# Patient Record
Sex: FEMALE | Race: WHITE | NOT HISPANIC OR LATINO | Employment: OTHER | URBAN - METROPOLITAN AREA
[De-identification: names, ages, dates, MRNs, and addresses within clinical notes are randomized per-mention and may not be internally consistent; named-entity substitution may affect disease eponyms.]

---

## 2017-06-01 ENCOUNTER — APPOINTMENT (EMERGENCY)
Dept: RADIOLOGY | Facility: HOSPITAL | Age: 77
DRG: 166 | End: 2017-06-01
Payer: COMMERCIAL

## 2017-06-01 ENCOUNTER — OFFICE VISIT (OUTPATIENT)
Dept: URGENT CARE | Facility: MEDICAL CENTER | Age: 77
DRG: 166 | End: 2017-06-01
Payer: COMMERCIAL

## 2017-06-01 ENCOUNTER — HOSPITAL ENCOUNTER (INPATIENT)
Facility: HOSPITAL | Age: 77
LOS: 7 days | Discharge: HOME | DRG: 166 | End: 2017-06-08
Attending: EMERGENCY MEDICINE | Admitting: INTERNAL MEDICINE
Payer: COMMERCIAL

## 2017-06-01 DIAGNOSIS — R91.8 HILAR MASS: ICD-10-CM

## 2017-06-01 DIAGNOSIS — D72.829 LEUCOCYTOSIS: ICD-10-CM

## 2017-06-01 DIAGNOSIS — I50.9 CHF (CONGESTIVE HEART FAILURE) (HCC): ICD-10-CM

## 2017-06-01 DIAGNOSIS — E87.1 HYPONATREMIA: ICD-10-CM

## 2017-06-01 DIAGNOSIS — L03.90 CELLULITIS: Primary | ICD-10-CM

## 2017-06-01 DIAGNOSIS — J90 PLEURAL EFFUSION ON RIGHT: ICD-10-CM

## 2017-06-01 PROBLEM — F17.200 CURRENT SMOKER ON SOME DAYS: Status: ACTIVE | Noted: 2017-06-01

## 2017-06-01 LAB
ANION GAP SERPL CALCULATED.3IONS-SCNC: 6 MMOL/L (ref 4–13)
BACTERIA UR QL AUTO: ABNORMAL /HPF
BASOPHILS # BLD MANUAL: 0 THOUSAND/UL (ref 0–0.1)
BASOPHILS NFR MAR MANUAL: 0 % (ref 0–1)
BILIRUB UR QL STRIP: ABNORMAL
BUN SERPL-MCNC: 10 MG/DL (ref 5–25)
CALCIUM SERPL-MCNC: 8.2 MG/DL (ref 8.3–10.1)
CHLORIDE SERPL-SCNC: 93 MMOL/L (ref 100–108)
CLARITY UR: ABNORMAL
CO2 SERPL-SCNC: 30 MMOL/L (ref 21–32)
COLOR UR: ABNORMAL
CREAT SERPL-MCNC: 0.71 MG/DL (ref 0.6–1.3)
EOSINOPHIL # BLD MANUAL: 0 THOUSAND/UL (ref 0–0.4)
EOSINOPHIL NFR BLD MANUAL: 0 % (ref 0–6)
ERYTHROCYTE [DISTWIDTH] IN BLOOD BY AUTOMATED COUNT: 13.3 % (ref 11.6–15.1)
GFR SERPL CREATININE-BSD FRML MDRD: >60 ML/MIN/1.73SQ M
GLUCOSE SERPL-MCNC: 140 MG/DL (ref 65–140)
GLUCOSE UR STRIP-MCNC: NEGATIVE MG/DL
HCT VFR BLD AUTO: 33.8 % (ref 34.8–46.1)
HGB BLD-MCNC: 12 G/DL (ref 11.5–15.4)
HGB UR QL STRIP.AUTO: ABNORMAL
KETONES UR STRIP-MCNC: ABNORMAL MG/DL
LACTATE SERPL-SCNC: 0.9 MMOL/L (ref 0.5–2)
LEUKOCYTE ESTERASE UR QL STRIP: NEGATIVE
LG PLATELETS BLD QL SMEAR: PRESENT
LYMPHOCYTES # BLD AUTO: 0.8 THOUSAND/UL (ref 0.6–4.47)
LYMPHOCYTES # BLD AUTO: 3 % (ref 14–44)
MCH RBC QN AUTO: 33 PG (ref 26.8–34.3)
MCHC RBC AUTO-ENTMCNC: 35.5 G/DL (ref 31.4–37.4)
MCV RBC AUTO: 93 FL (ref 82–98)
MONOCYTES # BLD AUTO: 0.8 THOUSAND/UL (ref 0–1.22)
MONOCYTES NFR BLD: 3 % (ref 4–12)
NEUTROPHILS # BLD MANUAL: 25.08 THOUSAND/UL (ref 1.85–7.62)
NEUTS BAND NFR BLD MANUAL: 3 % (ref 0–8)
NEUTS SEG NFR BLD AUTO: 91 % (ref 43–75)
NITRITE UR QL STRIP: POSITIVE
NON-SQ EPI CELLS URNS QL MICRO: ABNORMAL /HPF
NRBC BLD AUTO-RTO: 0 /100 WBCS
NT-PROBNP SERPL-MCNC: 714 PG/ML
PH UR STRIP.AUTO: 5.5 [PH] (ref 4.5–8)
PLATELET # BLD AUTO: 364 THOUSANDS/UL (ref 149–390)
PLATELET BLD QL SMEAR: ADEQUATE
PMV BLD AUTO: 8.9 FL (ref 8.9–12.7)
POTASSIUM SERPL-SCNC: 3.7 MMOL/L (ref 3.5–5.3)
PROT UR STRIP-MCNC: ABNORMAL MG/DL
RBC # BLD AUTO: 3.64 MILLION/UL (ref 3.81–5.12)
RBC #/AREA URNS AUTO: ABNORMAL /HPF
RBC MORPH BLD: NORMAL
SODIUM SERPL-SCNC: 129 MMOL/L (ref 136–145)
SP GR UR STRIP.AUTO: 1.02 (ref 1–1.03)
SPECIMEN SOURCE: NORMAL
TOTAL CELLS COUNTED SPEC: 100
TROPONIN I BLD-MCNC: 0.02 NG/ML (ref 0–0.08)
UROBILINOGEN UR QL STRIP.AUTO: 2 E.U./DL
WBC # BLD AUTO: 26.68 THOUSAND/UL (ref 4.31–10.16)
WBC #/AREA URNS AUTO: ABNORMAL /HPF

## 2017-06-01 PROCEDURE — 99202 OFFICE O/P NEW SF 15 MIN: CPT

## 2017-06-01 PROCEDURE — 87077 CULTURE AEROBIC IDENTIFY: CPT | Performed by: NURSE PRACTITIONER

## 2017-06-01 PROCEDURE — 81002 URINALYSIS NONAUTO W/O SCOPE: CPT | Performed by: EMERGENCY MEDICINE

## 2017-06-01 PROCEDURE — 83605 ASSAY OF LACTIC ACID: CPT | Performed by: EMERGENCY MEDICINE

## 2017-06-01 PROCEDURE — 81001 URINALYSIS AUTO W/SCOPE: CPT

## 2017-06-01 PROCEDURE — 84484 ASSAY OF TROPONIN QUANT: CPT

## 2017-06-01 PROCEDURE — S9088 SERVICES PROVIDED IN URGENT: HCPCS

## 2017-06-01 PROCEDURE — 87086 URINE CULTURE/COLONY COUNT: CPT | Performed by: NURSE PRACTITIONER

## 2017-06-01 PROCEDURE — 80048 BASIC METABOLIC PNL TOTAL CA: CPT | Performed by: EMERGENCY MEDICINE

## 2017-06-01 PROCEDURE — 99284 EMERGENCY DEPT VISIT MOD MDM: CPT

## 2017-06-01 PROCEDURE — 36415 COLL VENOUS BLD VENIPUNCTURE: CPT | Performed by: EMERGENCY MEDICINE

## 2017-06-01 PROCEDURE — 85027 COMPLETE CBC AUTOMATED: CPT | Performed by: EMERGENCY MEDICINE

## 2017-06-01 PROCEDURE — 87186 SC STD MICRODIL/AGAR DIL: CPT | Performed by: NURSE PRACTITIONER

## 2017-06-01 PROCEDURE — 87040 BLOOD CULTURE FOR BACTERIA: CPT | Performed by: EMERGENCY MEDICINE

## 2017-06-01 PROCEDURE — 71020 HB CHEST X-RAY 2VW FRONTAL&LATL: CPT

## 2017-06-01 PROCEDURE — 85007 BL SMEAR W/DIFF WBC COUNT: CPT | Performed by: EMERGENCY MEDICINE

## 2017-06-01 PROCEDURE — 83880 ASSAY OF NATRIURETIC PEPTIDE: CPT | Performed by: EMERGENCY MEDICINE

## 2017-06-01 RX ORDER — DOCUSATE SODIUM 100 MG/1
100 CAPSULE, LIQUID FILLED ORAL 2 TIMES DAILY
Status: DISCONTINUED | OUTPATIENT
Start: 2017-06-01 | End: 2017-06-08 | Stop reason: HOSPADM

## 2017-06-01 RX ORDER — CEFAZOLIN SODIUM 500 MG/2.2ML
500 INJECTION, POWDER, FOR SOLUTION INTRAMUSCULAR; INTRAVENOUS EVERY 8 HOURS
Status: DISCONTINUED | OUTPATIENT
Start: 2017-06-01 | End: 2017-06-01

## 2017-06-01 RX ORDER — SODIUM CHLORIDE 9 MG/ML
75 INJECTION, SOLUTION INTRAVENOUS CONTINUOUS
Status: DISCONTINUED | OUTPATIENT
Start: 2017-06-01 | End: 2017-06-02

## 2017-06-01 RX ORDER — CEFAZOLIN SODIUM 1 G/3ML
1000 INJECTION, POWDER, FOR SOLUTION INTRAMUSCULAR; INTRAVENOUS ONCE
Status: DISCONTINUED | OUTPATIENT
Start: 2017-06-01 | End: 2017-06-01

## 2017-06-01 RX ORDER — ACETAMINOPHEN 325 MG/1
650 TABLET ORAL EVERY 6 HOURS PRN
Status: DISCONTINUED | OUTPATIENT
Start: 2017-06-01 | End: 2017-06-08 | Stop reason: HOSPADM

## 2017-06-01 RX ADMIN — SODIUM CHLORIDE 75 ML/HR: 0.9 INJECTION, SOLUTION INTRAVENOUS at 21:41

## 2017-06-01 RX ADMIN — CEFAZOLIN SODIUM 2000 MG: 2 SOLUTION INTRAVENOUS at 20:04

## 2017-06-02 ENCOUNTER — APPOINTMENT (INPATIENT)
Dept: CT IMAGING | Facility: HOSPITAL | Age: 77
DRG: 166 | End: 2017-06-02
Payer: COMMERCIAL

## 2017-06-02 ENCOUNTER — APPOINTMENT (INPATIENT)
Dept: NON INVASIVE DIAGNOSTICS | Facility: HOSPITAL | Age: 77
DRG: 166 | End: 2017-06-02
Payer: COMMERCIAL

## 2017-06-02 LAB
ANION GAP SERPL CALCULATED.3IONS-SCNC: 7 MMOL/L (ref 4–13)
BUN SERPL-MCNC: 8 MG/DL (ref 5–25)
CALCIUM SERPL-MCNC: 8 MG/DL (ref 8.3–10.1)
CHLORIDE SERPL-SCNC: 98 MMOL/L (ref 100–108)
CO2 SERPL-SCNC: 29 MMOL/L (ref 21–32)
CREAT SERPL-MCNC: 0.5 MG/DL (ref 0.6–1.3)
ERYTHROCYTE [DISTWIDTH] IN BLOOD BY AUTOMATED COUNT: 13.5 % (ref 11.6–15.1)
GFR SERPL CREATININE-BSD FRML MDRD: >60 ML/MIN/1.73SQ M
GLUCOSE SERPL-MCNC: 98 MG/DL (ref 65–140)
HCT VFR BLD AUTO: 33 % (ref 34.8–46.1)
HGB BLD-MCNC: 11.4 G/DL (ref 11.5–15.4)
L PNEUMO1 AG UR QL IA.RAPID: NEGATIVE
MCH RBC QN AUTO: 32.1 PG (ref 26.8–34.3)
MCHC RBC AUTO-ENTMCNC: 34.5 G/DL (ref 31.4–37.4)
MCV RBC AUTO: 93 FL (ref 82–98)
PLATELET # BLD AUTO: 403 THOUSANDS/UL (ref 149–390)
PMV BLD AUTO: 9.2 FL (ref 8.9–12.7)
POTASSIUM SERPL-SCNC: 3.3 MMOL/L (ref 3.5–5.3)
RBC # BLD AUTO: 3.55 MILLION/UL (ref 3.81–5.12)
S PNEUM AG UR QL: NEGATIVE
SODIUM SERPL-SCNC: 134 MMOL/L (ref 136–145)
WBC # BLD AUTO: 22.16 THOUSAND/UL (ref 4.31–10.16)

## 2017-06-02 PROCEDURE — 87449 NOS EACH ORGANISM AG IA: CPT | Performed by: HOSPITALIST

## 2017-06-02 PROCEDURE — 74177 CT ABD & PELVIS W/CONTRAST: CPT

## 2017-06-02 PROCEDURE — 93970 EXTREMITY STUDY: CPT

## 2017-06-02 PROCEDURE — 85027 COMPLETE CBC AUTOMATED: CPT | Performed by: NURSE PRACTITIONER

## 2017-06-02 PROCEDURE — 71260 CT THORAX DX C+: CPT

## 2017-06-02 PROCEDURE — 80048 BASIC METABOLIC PNL TOTAL CA: CPT | Performed by: NURSE PRACTITIONER

## 2017-06-02 RX ORDER — AZITHROMYCIN 250 MG/1
500 TABLET, FILM COATED ORAL EVERY 24 HOURS
Status: DISCONTINUED | OUTPATIENT
Start: 2017-06-02 | End: 2017-06-02

## 2017-06-02 RX ORDER — HYDRALAZINE HYDROCHLORIDE 20 MG/ML
5 INJECTION INTRAMUSCULAR; INTRAVENOUS EVERY 6 HOURS PRN
Status: DISCONTINUED | OUTPATIENT
Start: 2017-06-02 | End: 2017-06-08 | Stop reason: HOSPADM

## 2017-06-02 RX ORDER — GUAIFENESIN 600 MG
600 TABLET, EXTENDED RELEASE 12 HR ORAL EVERY 12 HOURS SCHEDULED
Status: DISCONTINUED | OUTPATIENT
Start: 2017-06-02 | End: 2017-06-08 | Stop reason: HOSPADM

## 2017-06-02 RX ORDER — ALPRAZOLAM 0.5 MG/1
0.5 TABLET ORAL 2 TIMES DAILY PRN
Status: DISCONTINUED | OUTPATIENT
Start: 2017-06-02 | End: 2017-06-08 | Stop reason: HOSPADM

## 2017-06-02 RX ADMIN — GUAIFENESIN 600 MG: 600 TABLET, EXTENDED RELEASE ORAL at 11:30

## 2017-06-02 RX ADMIN — NICOTINE 1 PATCH: 7 PATCH, EXTENDED RELEASE TRANSDERMAL at 09:30

## 2017-06-02 RX ADMIN — CEFTRIAXONE 1000 MG: 1 INJECTION, POWDER, FOR SOLUTION INTRAMUSCULAR; INTRAVENOUS at 12:00

## 2017-06-02 RX ADMIN — ALPRAZOLAM 0.5 MG: 0.5 TABLET ORAL at 23:02

## 2017-06-02 RX ADMIN — AZITHROMYCIN 500 MG: 250 TABLET, FILM COATED ORAL at 12:30

## 2017-06-02 RX ADMIN — IOHEXOL 50 ML: 240 INJECTION, SOLUTION INTRATHECAL; INTRAVASCULAR; INTRAVENOUS; ORAL at 14:03

## 2017-06-02 RX ADMIN — IOHEXOL 100 ML: 350 INJECTION, SOLUTION INTRAVENOUS at 14:03

## 2017-06-02 RX ADMIN — GUAIFENESIN 600 MG: 600 TABLET, EXTENDED RELEASE ORAL at 21:52

## 2017-06-02 RX ADMIN — ENOXAPARIN SODIUM 40 MG: 40 INJECTION SUBCUTANEOUS at 09:30

## 2017-06-02 RX ADMIN — CEFAZOLIN 500 MG: 1 INJECTION, POWDER, FOR SOLUTION INTRAVENOUS at 05:00

## 2017-06-03 LAB
ALBUMIN SERPL BCP-MCNC: 1.6 G/DL (ref 3.5–5)
ALP SERPL-CCNC: 73 U/L (ref 46–116)
ALT SERPL W P-5'-P-CCNC: 30 U/L (ref 12–78)
ANION GAP SERPL CALCULATED.3IONS-SCNC: 5 MMOL/L (ref 4–13)
AST SERPL W P-5'-P-CCNC: 25 U/L (ref 5–45)
BACTERIA UR CULT: NORMAL
BACTERIA UR CULT: NORMAL
BASOPHILS # BLD AUTO: 0.03 THOUSANDS/ΜL (ref 0–0.1)
BASOPHILS NFR BLD AUTO: 0 % (ref 0–1)
BILIRUB SERPL-MCNC: 0.37 MG/DL (ref 0.2–1)
BUN SERPL-MCNC: 10 MG/DL (ref 5–25)
CALCIUM SERPL-MCNC: 8.2 MG/DL (ref 8.3–10.1)
CHLORIDE SERPL-SCNC: 101 MMOL/L (ref 100–108)
CO2 SERPL-SCNC: 29 MMOL/L (ref 21–32)
CREAT SERPL-MCNC: 0.49 MG/DL (ref 0.6–1.3)
EOSINOPHIL # BLD AUTO: 0.05 THOUSAND/ΜL (ref 0–0.61)
EOSINOPHIL NFR BLD AUTO: 0 % (ref 0–6)
ERYTHROCYTE [DISTWIDTH] IN BLOOD BY AUTOMATED COUNT: 13.4 % (ref 11.6–15.1)
GFR SERPL CREATININE-BSD FRML MDRD: >60 ML/MIN/1.73SQ M
GLUCOSE SERPL-MCNC: 97 MG/DL (ref 65–140)
HCT VFR BLD AUTO: 34.1 % (ref 34.8–46.1)
HGB BLD-MCNC: 11.8 G/DL (ref 11.5–15.4)
INR PPP: 1.09 (ref 0.86–1.16)
LYMPHOCYTES # BLD AUTO: 1.13 THOUSANDS/ΜL (ref 0.6–4.47)
LYMPHOCYTES NFR BLD AUTO: 6 % (ref 14–44)
MCH RBC QN AUTO: 32.2 PG (ref 26.8–34.3)
MCHC RBC AUTO-ENTMCNC: 34.6 G/DL (ref 31.4–37.4)
MCV RBC AUTO: 93 FL (ref 82–98)
MONOCYTES # BLD AUTO: 1.46 THOUSAND/ΜL (ref 0.17–1.22)
MONOCYTES NFR BLD AUTO: 7 % (ref 4–12)
NEUTROPHILS # BLD AUTO: 17.9 THOUSANDS/ΜL (ref 1.85–7.62)
NEUTS SEG NFR BLD AUTO: 87 % (ref 43–75)
NRBC BLD AUTO-RTO: 0 /100 WBCS
PLATELET # BLD AUTO: 399 THOUSANDS/UL (ref 149–390)
PMV BLD AUTO: 8.8 FL (ref 8.9–12.7)
POTASSIUM SERPL-SCNC: 3.4 MMOL/L (ref 3.5–5.3)
PROT SERPL-MCNC: 5.8 G/DL (ref 6.4–8.2)
PROTHROMBIN TIME: 14.1 SECONDS (ref 12.1–14.4)
RBC # BLD AUTO: 3.67 MILLION/UL (ref 3.81–5.12)
SODIUM SERPL-SCNC: 135 MMOL/L (ref 136–145)
WBC # BLD AUTO: 20.57 THOUSAND/UL (ref 4.31–10.16)

## 2017-06-03 PROCEDURE — 85610 PROTHROMBIN TIME: CPT | Performed by: INTERNAL MEDICINE

## 2017-06-03 PROCEDURE — 80053 COMPREHEN METABOLIC PANEL: CPT | Performed by: HOSPITALIST

## 2017-06-03 PROCEDURE — 85025 COMPLETE CBC W/AUTO DIFF WBC: CPT | Performed by: HOSPITALIST

## 2017-06-03 RX ORDER — FUROSEMIDE 10 MG/ML
20 INJECTION INTRAMUSCULAR; INTRAVENOUS DAILY
Status: DISCONTINUED | OUTPATIENT
Start: 2017-06-03 | End: 2017-06-04

## 2017-06-03 RX ADMIN — ALPRAZOLAM 0.5 MG: 0.5 TABLET ORAL at 22:48

## 2017-06-03 RX ADMIN — GUAIFENESIN 600 MG: 600 TABLET, EXTENDED RELEASE ORAL at 21:43

## 2017-06-03 RX ADMIN — CEFTRIAXONE 1000 MG: 1 INJECTION, POWDER, FOR SOLUTION INTRAMUSCULAR; INTRAVENOUS at 12:00

## 2017-06-03 RX ADMIN — FUROSEMIDE 20 MG: 10 INJECTION, SOLUTION INTRAMUSCULAR; INTRAVENOUS at 13:15

## 2017-06-03 RX ADMIN — HYDRALAZINE HYDROCHLORIDE 5 MG: 20 INJECTION INTRAMUSCULAR; INTRAVENOUS at 00:33

## 2017-06-03 RX ADMIN — NICOTINE 1 PATCH: 7 PATCH, EXTENDED RELEASE TRANSDERMAL at 08:58

## 2017-06-03 RX ADMIN — GUAIFENESIN 600 MG: 600 TABLET, EXTENDED RELEASE ORAL at 08:57

## 2017-06-03 RX ADMIN — HYDRALAZINE HYDROCHLORIDE 5 MG: 20 INJECTION INTRAMUSCULAR; INTRAVENOUS at 08:53

## 2017-06-03 RX ADMIN — ENOXAPARIN SODIUM 40 MG: 40 INJECTION SUBCUTANEOUS at 08:57

## 2017-06-04 RX ORDER — FUROSEMIDE 10 MG/ML
40 INJECTION INTRAMUSCULAR; INTRAVENOUS ONCE
Status: COMPLETED | OUTPATIENT
Start: 2017-06-04 | End: 2017-06-04

## 2017-06-04 RX ADMIN — CEFTRIAXONE 1000 MG: 1 INJECTION, POWDER, FOR SOLUTION INTRAMUSCULAR; INTRAVENOUS at 11:00

## 2017-06-04 RX ADMIN — ENOXAPARIN SODIUM 40 MG: 40 INJECTION SUBCUTANEOUS at 09:45

## 2017-06-04 RX ADMIN — ALPRAZOLAM 0.5 MG: 0.5 TABLET ORAL at 20:40

## 2017-06-04 RX ADMIN — FUROSEMIDE 40 MG: 10 INJECTION, SOLUTION INTRAMUSCULAR; INTRAVENOUS at 10:01

## 2017-06-04 RX ADMIN — GUAIFENESIN 600 MG: 600 TABLET, EXTENDED RELEASE ORAL at 20:40

## 2017-06-04 RX ADMIN — GUAIFENESIN 600 MG: 600 TABLET, EXTENDED RELEASE ORAL at 09:45

## 2017-06-04 RX ADMIN — NICOTINE 1 PATCH: 7 PATCH, EXTENDED RELEASE TRANSDERMAL at 09:45

## 2017-06-05 ENCOUNTER — APPOINTMENT (INPATIENT)
Dept: RADIOLOGY | Facility: HOSPITAL | Age: 77
DRG: 166 | End: 2017-06-05
Attending: INTERNAL MEDICINE
Payer: COMMERCIAL

## 2017-06-05 LAB
ANION GAP SERPL CALCULATED.3IONS-SCNC: 4 MMOL/L (ref 4–13)
BUN SERPL-MCNC: 14 MG/DL (ref 5–25)
CALCIUM SERPL-MCNC: 8.2 MG/DL (ref 8.3–10.1)
CHLORIDE SERPL-SCNC: 101 MMOL/L (ref 100–108)
CO2 SERPL-SCNC: 30 MMOL/L (ref 21–32)
CREAT SERPL-MCNC: 0.66 MG/DL (ref 0.6–1.3)
GFR SERPL CREATININE-BSD FRML MDRD: >60 ML/MIN/1.73SQ M
GLUCOSE SERPL-MCNC: 90 MG/DL (ref 65–140)
INR PPP: 0.99 (ref 0.86–1.16)
LDH SERPL-CCNC: 156 U/L (ref 81–234)
POTASSIUM SERPL-SCNC: 3.7 MMOL/L (ref 3.5–5.3)
PROT SERPL-MCNC: 6.4 G/DL (ref 6.4–8.2)
PROTHROMBIN TIME: 13.1 SECONDS (ref 12.1–14.4)
SODIUM SERPL-SCNC: 135 MMOL/L (ref 136–145)

## 2017-06-05 PROCEDURE — 80048 BASIC METABOLIC PNL TOTAL CA: CPT | Performed by: HOSPITALIST

## 2017-06-05 PROCEDURE — 85610 PROTHROMBIN TIME: CPT | Performed by: HOSPITALIST

## 2017-06-05 PROCEDURE — 76604 US EXAM CHEST: CPT

## 2017-06-05 PROCEDURE — 83615 LACTATE (LD) (LDH) ENZYME: CPT | Performed by: INTERNAL MEDICINE

## 2017-06-05 PROCEDURE — 84155 ASSAY OF PROTEIN SERUM: CPT | Performed by: INTERNAL MEDICINE

## 2017-06-05 RX ORDER — LIDOCAINE HYDROCHLORIDE 40 MG/ML
5 INJECTION, SOLUTION RETROBULBAR; TOPICAL ONCE
Status: DISCONTINUED | OUTPATIENT
Start: 2017-06-07 | End: 2017-06-07 | Stop reason: HOSPADM

## 2017-06-05 RX ADMIN — CEFTRIAXONE 1000 MG: 1 INJECTION, POWDER, FOR SOLUTION INTRAMUSCULAR; INTRAVENOUS at 11:46

## 2017-06-05 RX ADMIN — ALPRAZOLAM 0.5 MG: 0.5 TABLET ORAL at 07:50

## 2017-06-05 RX ADMIN — HYDRALAZINE HYDROCHLORIDE 5 MG: 20 INJECTION INTRAMUSCULAR; INTRAVENOUS at 00:00

## 2017-06-05 RX ADMIN — HYDRALAZINE HYDROCHLORIDE 5 MG: 20 INJECTION INTRAMUSCULAR; INTRAVENOUS at 23:08

## 2017-06-05 RX ADMIN — GUAIFENESIN 600 MG: 600 TABLET, EXTENDED RELEASE ORAL at 07:49

## 2017-06-05 RX ADMIN — GUAIFENESIN 600 MG: 600 TABLET, EXTENDED RELEASE ORAL at 21:38

## 2017-06-05 RX ADMIN — NICOTINE 1 PATCH: 7 PATCH, EXTENDED RELEASE TRANSDERMAL at 07:55

## 2017-06-05 RX ADMIN — ALPRAZOLAM 0.5 MG: 0.5 TABLET ORAL at 21:40

## 2017-06-06 ENCOUNTER — ANESTHESIA EVENT (INPATIENT)
Dept: GASTROENTEROLOGY | Facility: HOSPITAL | Age: 77
DRG: 166 | End: 2017-06-06
Payer: COMMERCIAL

## 2017-06-06 LAB
ANION GAP SERPL CALCULATED.3IONS-SCNC: 5 MMOL/L (ref 4–13)
BACTERIA BLD CULT: NORMAL
BACTERIA BLD CULT: NORMAL
BASOPHILS # BLD AUTO: 0.05 THOUSANDS/ΜL (ref 0–0.1)
BASOPHILS NFR BLD AUTO: 0 % (ref 0–1)
BUN SERPL-MCNC: 14 MG/DL (ref 5–25)
CALCIUM SERPL-MCNC: 8.3 MG/DL (ref 8.3–10.1)
CHLORIDE SERPL-SCNC: 101 MMOL/L (ref 100–108)
CO2 SERPL-SCNC: 29 MMOL/L (ref 21–32)
CREAT SERPL-MCNC: 0.5 MG/DL (ref 0.6–1.3)
EOSINOPHIL # BLD AUTO: 0.17 THOUSAND/ΜL (ref 0–0.61)
EOSINOPHIL NFR BLD AUTO: 1 % (ref 0–6)
ERYTHROCYTE [DISTWIDTH] IN BLOOD BY AUTOMATED COUNT: 14 % (ref 11.6–15.1)
GFR SERPL CREATININE-BSD FRML MDRD: >60 ML/MIN/1.73SQ M
GLUCOSE SERPL-MCNC: 95 MG/DL (ref 65–140)
HCT VFR BLD AUTO: 33.5 % (ref 34.8–46.1)
HGB BLD-MCNC: 11.1 G/DL (ref 11.5–15.4)
LYMPHOCYTES # BLD AUTO: 1.14 THOUSANDS/ΜL (ref 0.6–4.47)
LYMPHOCYTES NFR BLD AUTO: 9 % (ref 14–44)
MCH RBC QN AUTO: 31.2 PG (ref 26.8–34.3)
MCHC RBC AUTO-ENTMCNC: 33.1 G/DL (ref 31.4–37.4)
MCV RBC AUTO: 94 FL (ref 82–98)
MONOCYTES # BLD AUTO: 1.31 THOUSAND/ΜL (ref 0.17–1.22)
MONOCYTES NFR BLD AUTO: 10 % (ref 4–12)
NEUTROPHILS # BLD AUTO: 10.13 THOUSANDS/ΜL (ref 1.85–7.62)
NEUTS SEG NFR BLD AUTO: 80 % (ref 43–75)
NRBC BLD AUTO-RTO: 0 /100 WBCS
PLATELET # BLD AUTO: 459 THOUSANDS/UL (ref 149–390)
PMV BLD AUTO: 9 FL (ref 8.9–12.7)
POTASSIUM SERPL-SCNC: 4 MMOL/L (ref 3.5–5.3)
RBC # BLD AUTO: 3.56 MILLION/UL (ref 3.81–5.12)
SODIUM SERPL-SCNC: 135 MMOL/L (ref 136–145)
WBC # BLD AUTO: 12.8 THOUSAND/UL (ref 4.31–10.16)

## 2017-06-06 PROCEDURE — 85025 COMPLETE CBC W/AUTO DIFF WBC: CPT | Performed by: INTERNAL MEDICINE

## 2017-06-06 PROCEDURE — 80048 BASIC METABOLIC PNL TOTAL CA: CPT | Performed by: INTERNAL MEDICINE

## 2017-06-06 RX ORDER — CEPHALEXIN 500 MG/1
500 CAPSULE ORAL EVERY 6 HOURS SCHEDULED
Status: DISCONTINUED | OUTPATIENT
Start: 2017-06-06 | End: 2017-06-08 | Stop reason: HOSPADM

## 2017-06-06 RX ADMIN — ENOXAPARIN SODIUM 40 MG: 40 INJECTION SUBCUTANEOUS at 08:36

## 2017-06-06 RX ADMIN — GUAIFENESIN 600 MG: 600 TABLET, EXTENDED RELEASE ORAL at 08:36

## 2017-06-06 RX ADMIN — GUAIFENESIN 600 MG: 600 TABLET, EXTENDED RELEASE ORAL at 21:24

## 2017-06-06 RX ADMIN — NICOTINE 1 PATCH: 7 PATCH, EXTENDED RELEASE TRANSDERMAL at 08:36

## 2017-06-06 RX ADMIN — CEPHALEXIN 500 MG: 500 CAPSULE ORAL at 18:36

## 2017-06-06 RX ADMIN — ALPRAZOLAM 0.5 MG: 0.5 TABLET ORAL at 23:00

## 2017-06-06 RX ADMIN — CEPHALEXIN 500 MG: 500 CAPSULE ORAL at 13:57

## 2017-06-07 ENCOUNTER — ANESTHESIA (INPATIENT)
Dept: GASTROENTEROLOGY | Facility: HOSPITAL | Age: 77
DRG: 166 | End: 2017-06-07
Payer: COMMERCIAL

## 2017-06-07 LAB
BASOPHILS # BLD AUTO: 0.03 THOUSANDS/ΜL (ref 0–0.1)
BASOPHILS NFR BLD AUTO: 0 % (ref 0–1)
EOSINOPHIL # BLD AUTO: 0.13 THOUSAND/ΜL (ref 0–0.61)
EOSINOPHIL NFR BLD AUTO: 1 % (ref 0–6)
ERYTHROCYTE [DISTWIDTH] IN BLOOD BY AUTOMATED COUNT: 13.9 % (ref 11.6–15.1)
HCT VFR BLD AUTO: 34.9 % (ref 34.8–46.1)
HGB BLD-MCNC: 11.3 G/DL (ref 11.5–15.4)
LYMPHOCYTES # BLD AUTO: 1.13 THOUSANDS/ΜL (ref 0.6–4.47)
LYMPHOCYTES NFR BLD AUTO: 9 % (ref 14–44)
MCH RBC QN AUTO: 30.9 PG (ref 26.8–34.3)
MCHC RBC AUTO-ENTMCNC: 32.4 G/DL (ref 31.4–37.4)
MCV RBC AUTO: 95 FL (ref 82–98)
MONOCYTES # BLD AUTO: 1.25 THOUSAND/ΜL (ref 0.17–1.22)
MONOCYTES NFR BLD AUTO: 10 % (ref 4–12)
NEUTROPHILS # BLD AUTO: 9.52 THOUSANDS/ΜL (ref 1.85–7.62)
NEUTS SEG NFR BLD AUTO: 80 % (ref 43–75)
PLATELET # BLD AUTO: 491 THOUSANDS/UL (ref 149–390)
PMV BLD AUTO: 8.7 FL (ref 8.9–12.7)
RBC # BLD AUTO: 3.66 MILLION/UL (ref 3.81–5.12)
WBC # BLD AUTO: 12.06 THOUSAND/UL (ref 4.31–10.16)

## 2017-06-07 PROCEDURE — 0BB58ZX EXCISION OF RIGHT MIDDLE LOBE BRONCHUS, VIA NATURAL OR ARTIFICIAL OPENING ENDOSCOPIC, DIAGNOSTIC: ICD-10-PCS | Performed by: INTERNAL MEDICINE

## 2017-06-07 PROCEDURE — 87118 MYCOBACTERIC IDENTIFICATION: CPT | Performed by: INTERNAL MEDICINE

## 2017-06-07 PROCEDURE — 87070 CULTURE OTHR SPECIMN AEROBIC: CPT | Performed by: INTERNAL MEDICINE

## 2017-06-07 PROCEDURE — 07B74ZX EXCISION OF THORAX LYMPHATIC, PERCUTANEOUS ENDOSCOPIC APPROACH, DIAGNOSTIC: ICD-10-PCS | Performed by: INTERNAL MEDICINE

## 2017-06-07 PROCEDURE — 87116 MYCOBACTERIA CULTURE: CPT | Performed by: INTERNAL MEDICINE

## 2017-06-07 PROCEDURE — 88305 TISSUE EXAM BY PATHOLOGIST: CPT | Performed by: INTERNAL MEDICINE

## 2017-06-07 PROCEDURE — 88342 IMHCHEM/IMCYTCHM 1ST ANTB: CPT | Performed by: INTERNAL MEDICINE

## 2017-06-07 PROCEDURE — 88173 CYTOPATH EVAL FNA REPORT: CPT | Performed by: INTERNAL MEDICINE

## 2017-06-07 PROCEDURE — 88172 CYTP DX EVAL FNA 1ST EA SITE: CPT | Performed by: INTERNAL MEDICINE

## 2017-06-07 PROCEDURE — 88341 IMHCHEM/IMCYTCHM EA ADD ANTB: CPT | Performed by: INTERNAL MEDICINE

## 2017-06-07 PROCEDURE — 85025 COMPLETE CBC W/AUTO DIFF WBC: CPT | Performed by: INTERNAL MEDICINE

## 2017-06-07 PROCEDURE — 87206 SMEAR FLUORESCENT/ACID STAI: CPT | Performed by: INTERNAL MEDICINE

## 2017-06-07 PROCEDURE — 88112 CYTOPATH CELL ENHANCE TECH: CPT | Performed by: INTERNAL MEDICINE

## 2017-06-07 PROCEDURE — 87102 FUNGUS ISOLATION CULTURE: CPT | Performed by: INTERNAL MEDICINE

## 2017-06-07 RX ORDER — LORAZEPAM 2 MG/ML
0.5 INJECTION INTRAMUSCULAR ONCE
Status: COMPLETED | OUTPATIENT
Start: 2017-06-07 | End: 2017-06-07

## 2017-06-07 RX ORDER — ONDANSETRON 2 MG/ML
4 INJECTION INTRAMUSCULAR; INTRAVENOUS EVERY 6 HOURS PRN
Status: DISCONTINUED | OUTPATIENT
Start: 2017-06-07 | End: 2017-06-07 | Stop reason: HOSPADM

## 2017-06-07 RX ORDER — LIDOCAINE HYDROCHLORIDE 20 MG/ML
INJECTION, SOLUTION EPIDURAL; INFILTRATION; INTRACAUDAL; PERINEURAL AS NEEDED
Status: DISCONTINUED | OUTPATIENT
Start: 2017-06-07 | End: 2017-06-07 | Stop reason: HOSPADM

## 2017-06-07 RX ORDER — SODIUM CHLORIDE 9 MG/ML
125 INJECTION, SOLUTION INTRAVENOUS CONTINUOUS
Status: DISCONTINUED | OUTPATIENT
Start: 2017-06-07 | End: 2017-06-08

## 2017-06-07 RX ORDER — PROPOFOL 10 MG/ML
INJECTION, EMULSION INTRAVENOUS AS NEEDED
Status: DISCONTINUED | OUTPATIENT
Start: 2017-06-07 | End: 2017-06-07 | Stop reason: SURG

## 2017-06-07 RX ORDER — LIDOCAINE HYDROCHLORIDE 20 MG/ML
INJECTION, SOLUTION INFILTRATION; PERINEURAL AS NEEDED
Status: DISCONTINUED | OUTPATIENT
Start: 2017-06-07 | End: 2017-06-07 | Stop reason: HOSPADM

## 2017-06-07 RX ADMIN — CEPHALEXIN 500 MG: 500 CAPSULE ORAL at 17:30

## 2017-06-07 RX ADMIN — GUAIFENESIN 600 MG: 600 TABLET, EXTENDED RELEASE ORAL at 20:50

## 2017-06-07 RX ADMIN — PROPOFOL 50 MG: 10 INJECTION, EMULSION INTRAVENOUS at 14:51

## 2017-06-07 RX ADMIN — LIDOCAINE HYDROCHLORIDE 50 MG: 20 INJECTION, SOLUTION INTRAVENOUS at 14:35

## 2017-06-07 RX ADMIN — PROPOFOL 50 MG: 10 INJECTION, EMULSION INTRAVENOUS at 15:01

## 2017-06-07 RX ADMIN — PROPOFOL 50 MG: 10 INJECTION, EMULSION INTRAVENOUS at 14:45

## 2017-06-07 RX ADMIN — CEPHALEXIN 500 MG: 500 CAPSULE ORAL at 00:15

## 2017-06-07 RX ADMIN — PROPOFOL 50 MG: 10 INJECTION, EMULSION INTRAVENOUS at 14:55

## 2017-06-07 RX ADMIN — GUAIFENESIN 600 MG: 600 TABLET, EXTENDED RELEASE ORAL at 08:37

## 2017-06-07 RX ADMIN — CEPHALEXIN 500 MG: 500 CAPSULE ORAL at 11:28

## 2017-06-07 RX ADMIN — LORAZEPAM 0.5 MG: 2 INJECTION INTRAMUSCULAR; INTRAVENOUS at 02:02

## 2017-06-07 RX ADMIN — HYDRALAZINE HYDROCHLORIDE 5 MG: 20 INJECTION INTRAMUSCULAR; INTRAVENOUS at 00:16

## 2017-06-07 RX ADMIN — PROPOFOL 50 MG: 10 INJECTION, EMULSION INTRAVENOUS at 14:42

## 2017-06-07 RX ADMIN — CEPHALEXIN 500 MG: 500 CAPSULE ORAL at 05:52

## 2017-06-07 RX ADMIN — PROPOFOL 30 MG: 10 INJECTION, EMULSION INTRAVENOUS at 14:39

## 2017-06-07 RX ADMIN — PROPOFOL 50 MG: 10 INJECTION, EMULSION INTRAVENOUS at 15:06

## 2017-06-07 RX ADMIN — PROPOFOL 20 MG: 10 INJECTION, EMULSION INTRAVENOUS at 14:37

## 2017-06-07 RX ADMIN — PROPOFOL 50 MG: 10 INJECTION, EMULSION INTRAVENOUS at 14:35

## 2017-06-08 VITALS
HEART RATE: 79 BPM | RESPIRATION RATE: 16 BRPM | HEIGHT: 63 IN | DIASTOLIC BLOOD PRESSURE: 56 MMHG | WEIGHT: 104.06 LBS | OXYGEN SATURATION: 96 % | BODY MASS INDEX: 18.44 KG/M2 | TEMPERATURE: 97.4 F | SYSTOLIC BLOOD PRESSURE: 146 MMHG

## 2017-06-08 PROBLEM — R91.8 HILAR MASS: Status: ACTIVE | Noted: 2017-06-08

## 2017-06-08 PROCEDURE — 90670 PCV13 VACCINE IM: CPT | Performed by: INTERNAL MEDICINE

## 2017-06-08 PROCEDURE — G0009 ADMIN PNEUMOCOCCAL VACCINE: HCPCS | Performed by: INTERNAL MEDICINE

## 2017-06-08 RX ORDER — GUAIFENESIN 600 MG
600 TABLET, EXTENDED RELEASE 12 HR ORAL EVERY 12 HOURS SCHEDULED
Qty: 30 TABLET | Refills: 0 | Status: SHIPPED | OUTPATIENT
Start: 2017-06-08 | End: 2017-07-27 | Stop reason: ALTCHOICE

## 2017-06-08 RX ORDER — CEPHALEXIN 500 MG/1
500 CAPSULE ORAL 2 TIMES DAILY
Qty: 10 CAPSULE | Refills: 0 | Status: SHIPPED | OUTPATIENT
Start: 2017-06-08 | End: 2017-06-13

## 2017-06-08 RX ADMIN — HYDRALAZINE HYDROCHLORIDE 5 MG: 20 INJECTION INTRAMUSCULAR; INTRAVENOUS at 07:51

## 2017-06-08 RX ADMIN — CEPHALEXIN 500 MG: 500 CAPSULE ORAL at 11:29

## 2017-06-08 RX ADMIN — CEPHALEXIN 500 MG: 500 CAPSULE ORAL at 00:52

## 2017-06-08 RX ADMIN — GUAIFENESIN 600 MG: 600 TABLET, EXTENDED RELEASE ORAL at 08:00

## 2017-06-08 RX ADMIN — CEPHALEXIN 500 MG: 500 CAPSULE ORAL at 05:29

## 2017-06-08 RX ADMIN — ENOXAPARIN SODIUM 40 MG: 40 INJECTION SUBCUTANEOUS at 08:00

## 2017-06-08 RX ADMIN — PNEUMOCOCCAL 13-VALENT CONJUGATE VACCINE 0.5 ML: 2.2; 2.2; 2.2; 2.2; 2.2; 4.4; 2.2; 2.2; 2.2; 2.2; 2.2; 2.2; 2.2 INJECTION, SUSPENSION INTRAMUSCULAR at 12:25

## 2017-06-09 LAB
BACTERIA BRONCH AEROBE CULT: NORMAL
GRAM STN SPEC: NORMAL

## 2017-06-15 ENCOUNTER — ALLSCRIPTS OFFICE VISIT (OUTPATIENT)
Dept: OTHER | Facility: OTHER | Age: 77
End: 2017-06-15

## 2017-06-15 DIAGNOSIS — Z13.820 ENCOUNTER FOR SCREENING FOR OSTEOPOROSIS: ICD-10-CM

## 2017-06-15 DIAGNOSIS — E87.1 HYPO-OSMOLALITY AND HYPONATREMIA: ICD-10-CM

## 2017-06-15 DIAGNOSIS — C34.91 MALIGNANT NEOPLASM OF UNSPECIFIED PART OF RIGHT BRONCHUS OR LUNG (HCC): ICD-10-CM

## 2017-06-19 ENCOUNTER — TRANSCRIBE ORDERS (OUTPATIENT)
Dept: ADMINISTRATIVE | Facility: HOSPITAL | Age: 77
End: 2017-06-19

## 2017-06-19 DIAGNOSIS — C34.91 PRIMARY LUNG CANCER WITH METASTASIS FROM LUNG TO OTHER SITE, RIGHT (HCC): Primary | ICD-10-CM

## 2017-06-26 ENCOUNTER — HOSPITAL ENCOUNTER (OUTPATIENT)
Dept: RADIOLOGY | Age: 77
Discharge: HOME | End: 2017-06-26
Payer: COMMERCIAL

## 2017-06-26 VITALS — BODY MASS INDEX: 17.01 KG/M2 | WEIGHT: 96 LBS

## 2017-06-26 DIAGNOSIS — C34.91 PRIMARY LUNG CANCER WITH METASTASIS FROM LUNG TO OTHER SITE, RIGHT (HCC): ICD-10-CM

## 2017-06-26 LAB — GLUCOSE SERPL-MCNC: 95 MG/DL (ref 65–140)

## 2017-06-26 PROCEDURE — 82948 REAGENT STRIP/BLOOD GLUCOSE: CPT

## 2017-06-26 PROCEDURE — A9552 F18 FDG: HCPCS

## 2017-06-26 PROCEDURE — 78815 PET IMAGE W/CT SKULL-THIGH: CPT

## 2017-06-26 RX ADMIN — IOHEXOL 5 ML: 240 INJECTION, SOLUTION INTRATHECAL; INTRAVASCULAR; INTRAVENOUS; ORAL at 13:50

## 2017-06-28 ENCOUNTER — APPOINTMENT (OUTPATIENT)
Dept: LAB | Facility: CLINIC | Age: 77
End: 2017-06-28
Payer: COMMERCIAL

## 2017-06-28 ENCOUNTER — TRANSCRIBE ORDERS (OUTPATIENT)
Dept: LAB | Facility: CLINIC | Age: 77
End: 2017-06-28

## 2017-06-28 DIAGNOSIS — C34.91 MALIGNANT NEOPLASM OF UNSPECIFIED PART OF RIGHT BRONCHUS OR LUNG (HCC): ICD-10-CM

## 2017-06-28 DIAGNOSIS — E87.1 HYPO-OSMOLALITY AND HYPONATREMIA: ICD-10-CM

## 2017-06-28 LAB
ANION GAP SERPL CALCULATED.3IONS-SCNC: 7 MMOL/L (ref 4–13)
BASOPHILS # BLD AUTO: 0.06 THOUSANDS/ΜL (ref 0–0.1)
BASOPHILS NFR BLD AUTO: 1 % (ref 0–1)
BUN SERPL-MCNC: 9 MG/DL (ref 5–25)
CALCIUM SERPL-MCNC: 9 MG/DL (ref 8.3–10.1)
CHLORIDE SERPL-SCNC: 93 MMOL/L (ref 100–108)
CO2 SERPL-SCNC: 28 MMOL/L (ref 21–32)
CREAT SERPL-MCNC: 0.5 MG/DL (ref 0.6–1.3)
EOSINOPHIL # BLD AUTO: 0.06 THOUSAND/ΜL (ref 0–0.61)
EOSINOPHIL NFR BLD AUTO: 1 % (ref 0–6)
ERYTHROCYTE [DISTWIDTH] IN BLOOD BY AUTOMATED COUNT: 14.4 % (ref 11.6–15.1)
GFR SERPL CREATININE-BSD FRML MDRD: >60 ML/MIN/1.73SQ M
GLUCOSE P FAST SERPL-MCNC: 134 MG/DL (ref 65–99)
HCT VFR BLD AUTO: 36.9 % (ref 34.8–46.1)
HGB BLD-MCNC: 12.1 G/DL (ref 11.5–15.4)
LYMPHOCYTES # BLD AUTO: 1.2 THOUSANDS/ΜL (ref 0.6–4.47)
LYMPHOCYTES NFR BLD AUTO: 12 % (ref 14–44)
MCH RBC QN AUTO: 31.1 PG (ref 26.8–34.3)
MCHC RBC AUTO-ENTMCNC: 32.8 G/DL (ref 31.4–37.4)
MCV RBC AUTO: 95 FL (ref 82–98)
MONOCYTES # BLD AUTO: 1.23 THOUSAND/ΜL (ref 0.17–1.22)
MONOCYTES NFR BLD AUTO: 12 % (ref 4–12)
NEUTROPHILS # BLD AUTO: 7.52 THOUSANDS/ΜL (ref 1.85–7.62)
NEUTS SEG NFR BLD AUTO: 74 % (ref 43–75)
NRBC BLD AUTO-RTO: 0 /100 WBCS
PLATELET # BLD AUTO: 481 THOUSANDS/UL (ref 149–390)
PMV BLD AUTO: 9.5 FL (ref 8.9–12.7)
POTASSIUM SERPL-SCNC: 4.1 MMOL/L (ref 3.5–5.3)
RBC # BLD AUTO: 3.89 MILLION/UL (ref 3.81–5.12)
SODIUM SERPL-SCNC: 128 MMOL/L (ref 136–145)
WBC # BLD AUTO: 10.1 THOUSAND/UL (ref 4.31–10.16)

## 2017-06-28 PROCEDURE — 85025 COMPLETE CBC W/AUTO DIFF WBC: CPT

## 2017-06-28 PROCEDURE — 36415 COLL VENOUS BLD VENIPUNCTURE: CPT

## 2017-06-28 PROCEDURE — 80048 BASIC METABOLIC PNL TOTAL CA: CPT

## 2017-06-29 ENCOUNTER — GENERIC CONVERSION - ENCOUNTER (OUTPATIENT)
Dept: OTHER | Facility: OTHER | Age: 77
End: 2017-06-29

## 2017-06-29 LAB
LABORATORY COMMENT REPORT: NORMAL
M AVIUM CMPLX RRNA SPEC QL PROBE: POSITIVE
M GORDONAE RRNA SPEC QL PROBE: ABNORMAL
M KANSASII RRNA SPEC QL PROBE: ABNORMAL
M TB CMPLX RRNA SPEC QL PROBE: NEGATIVE
OTHER: ABNORMAL

## 2017-06-30 LAB
MYCOBACTERIUM SPEC CULT: ABNORMAL
MYCOBACTERIUM SPEC CULT: ABNORMAL
RHODAMINE-AURAMINE STN SPEC: ABNORMAL

## 2017-07-05 ENCOUNTER — ALLSCRIPTS OFFICE VISIT (OUTPATIENT)
Dept: OTHER | Facility: OTHER | Age: 77
End: 2017-07-05

## 2017-07-10 LAB — FUNGUS SPEC CULT: NORMAL

## 2017-07-12 ENCOUNTER — ALLSCRIPTS OFFICE VISIT (OUTPATIENT)
Dept: OTHER | Facility: OTHER | Age: 77
End: 2017-07-12

## 2017-07-18 ENCOUNTER — ALLSCRIPTS OFFICE VISIT (OUTPATIENT)
Dept: OTHER | Facility: OTHER | Age: 77
End: 2017-07-18

## 2017-07-18 DIAGNOSIS — C79.51 SECONDARY MALIGNANT NEOPLASM OF BONE (HCC): ICD-10-CM

## 2017-07-20 ENCOUNTER — GENERIC CONVERSION - ENCOUNTER (OUTPATIENT)
Dept: OTHER | Facility: OTHER | Age: 77
End: 2017-07-20

## 2017-07-25 ENCOUNTER — TRANSCRIBE ORDERS (OUTPATIENT)
Dept: LAB | Facility: CLINIC | Age: 77
End: 2017-07-25

## 2017-07-25 ENCOUNTER — APPOINTMENT (OUTPATIENT)
Dept: LAB | Facility: CLINIC | Age: 77
End: 2017-07-25
Payer: COMMERCIAL

## 2017-07-25 DIAGNOSIS — C79.51 SECONDARY MALIGNANT NEOPLASM OF BONE (HCC): ICD-10-CM

## 2017-07-25 LAB
ALBUMIN SERPL BCP-MCNC: 2.5 G/DL (ref 3.5–5)
ALP SERPL-CCNC: 94 U/L (ref 46–116)
ALT SERPL W P-5'-P-CCNC: 23 U/L (ref 12–78)
ANION GAP SERPL CALCULATED.3IONS-SCNC: 10 MMOL/L (ref 4–13)
AST SERPL W P-5'-P-CCNC: 20 U/L (ref 5–45)
BASOPHILS # BLD AUTO: 0.05 THOUSANDS/ΜL (ref 0–0.1)
BASOPHILS NFR BLD AUTO: 0 % (ref 0–1)
BILIRUB SERPL-MCNC: 0.62 MG/DL (ref 0.2–1)
BUN SERPL-MCNC: 9 MG/DL (ref 5–25)
CALCIUM SERPL-MCNC: 9.1 MG/DL (ref 8.3–10.1)
CHLORIDE SERPL-SCNC: 90 MMOL/L (ref 100–108)
CO2 SERPL-SCNC: 25 MMOL/L (ref 21–32)
CREAT SERPL-MCNC: 0.36 MG/DL (ref 0.6–1.3)
EOSINOPHIL # BLD AUTO: 0.02 THOUSAND/ΜL (ref 0–0.61)
EOSINOPHIL NFR BLD AUTO: 0 % (ref 0–6)
ERYTHROCYTE [DISTWIDTH] IN BLOOD BY AUTOMATED COUNT: 14.2 % (ref 11.6–15.1)
GFR SERPL CREATININE-BSD FRML MDRD: 106 ML/MIN/1.73SQ M
GLUCOSE SERPL-MCNC: 93 MG/DL (ref 65–140)
HCT VFR BLD AUTO: 33.1 % (ref 34.8–46.1)
HGB BLD-MCNC: 11 G/DL (ref 11.5–15.4)
LYMPHOCYTES # BLD AUTO: 0.95 THOUSANDS/ΜL (ref 0.6–4.47)
LYMPHOCYTES NFR BLD AUTO: 6 % (ref 14–44)
MCH RBC QN AUTO: 29.2 PG (ref 26.8–34.3)
MCHC RBC AUTO-ENTMCNC: 33.2 G/DL (ref 31.4–37.4)
MCV RBC AUTO: 88 FL (ref 82–98)
MONOCYTES # BLD AUTO: 1.78 THOUSAND/ΜL (ref 0.17–1.22)
MONOCYTES NFR BLD AUTO: 12 % (ref 4–12)
NEUTROPHILS # BLD AUTO: 12.34 THOUSANDS/ΜL (ref 1.85–7.62)
NEUTS SEG NFR BLD AUTO: 82 % (ref 43–75)
NRBC BLD AUTO-RTO: 0 /100 WBCS
PLATELET # BLD AUTO: 511 THOUSANDS/UL (ref 149–390)
PMV BLD AUTO: 9.2 FL (ref 8.9–12.7)
POTASSIUM SERPL-SCNC: 3.7 MMOL/L (ref 3.5–5.3)
PROT SERPL-MCNC: 7.3 G/DL (ref 6.4–8.2)
RBC # BLD AUTO: 3.77 MILLION/UL (ref 3.81–5.12)
SODIUM SERPL-SCNC: 125 MMOL/L (ref 136–145)
WBC # BLD AUTO: 15.19 THOUSAND/UL (ref 4.31–10.16)

## 2017-07-25 PROCEDURE — 80053 COMPREHEN METABOLIC PANEL: CPT

## 2017-07-25 PROCEDURE — 36415 COLL VENOUS BLD VENIPUNCTURE: CPT

## 2017-07-25 PROCEDURE — 85025 COMPLETE CBC W/AUTO DIFF WBC: CPT

## 2017-07-26 RX ORDER — SODIUM CHLORIDE 9 MG/ML
20 INJECTION, SOLUTION INTRAVENOUS CONTINUOUS
Status: DISCONTINUED | OUTPATIENT
Start: 2017-07-27 | End: 2017-07-30 | Stop reason: HOSPADM

## 2017-07-27 ENCOUNTER — HOSPITAL ENCOUNTER (OUTPATIENT)
Dept: INFUSION CENTER | Facility: CLINIC | Age: 77
Discharge: HOME | End: 2017-07-27
Payer: COMMERCIAL

## 2017-07-27 VITALS
BODY MASS INDEX: 16.8 KG/M2 | RESPIRATION RATE: 16 BRPM | SYSTOLIC BLOOD PRESSURE: 150 MMHG | WEIGHT: 94.8 LBS | TEMPERATURE: 98 F | DIASTOLIC BLOOD PRESSURE: 90 MMHG | HEART RATE: 92 BPM | HEIGHT: 63 IN

## 2017-07-27 PROCEDURE — 96375 TX/PRO/DX INJ NEW DRUG ADDON: CPT

## 2017-07-27 PROCEDURE — 96417 CHEMO IV INFUS EACH ADDL SEQ: CPT

## 2017-07-27 PROCEDURE — 96413 CHEMO IV INFUSION 1 HR: CPT

## 2017-07-27 RX ORDER — ZOLPIDEM TARTRATE 5 MG/1
5 TABLET ORAL
COMMUNITY

## 2017-07-27 RX ADMIN — DEXAMETHASONE SODIUM PHOSPHATE: 10 INJECTION, SOLUTION INTRAMUSCULAR; INTRAVENOUS at 09:55

## 2017-07-27 RX ADMIN — PACLITAXEL 114 MG: 100 INJECTION, POWDER, LYOPHILIZED, FOR SUSPENSION INTRAVENOUS at 10:29

## 2017-07-27 RX ADMIN — SODIUM CHLORIDE 20 ML/HR: 0.9 INJECTION, SOLUTION INTRAVENOUS at 09:55

## 2017-07-27 RX ADMIN — CARBOPLATIN 366 MG: 10 INJECTION, SOLUTION INTRAVENOUS at 11:13

## 2017-07-27 NOTE — PROGRESS NOTES
Pt arrived to unit without complaint, tolerated first cycle Carboplatin/Abraxane as ordered (see MAR) without adverse reaction  Pt left unit in stable condition accompanied by her , both deny questions or concerns, AVS provided

## 2017-07-27 NOTE — PLAN OF CARE
Problem: Potential for Falls  Goal: Patient will remain free of falls  INTERVENTIONS:  - Assess patient frequently for physical needs  -  Identify cognitive and physical deficits and behaviors that affect risk of falls    -  [place] fall precautions as indicated by assessment   - Educate patient/family on patient safety including physical limitations  - Instruct patient to call for assistance with activity based on assessment  - Modify environment to reduce risk of injury  - Consider OT/PT consult to assist with strengthening/mobility   Outcome: Progressing

## 2017-08-01 ENCOUNTER — APPOINTMENT (OUTPATIENT)
Dept: LAB | Facility: CLINIC | Age: 77
End: 2017-08-01
Payer: COMMERCIAL

## 2017-08-01 ENCOUNTER — TRANSCRIBE ORDERS (OUTPATIENT)
Dept: LAB | Facility: CLINIC | Age: 77
End: 2017-08-01

## 2017-08-01 DIAGNOSIS — C79.51 SECONDARY MALIGNANT NEOPLASM OF BONE (HCC): ICD-10-CM

## 2017-08-01 LAB
ALBUMIN SERPL BCP-MCNC: 2.4 G/DL (ref 3.5–5)
ALP SERPL-CCNC: 90 U/L (ref 46–116)
ALT SERPL W P-5'-P-CCNC: 15 U/L (ref 12–78)
ANION GAP SERPL CALCULATED.3IONS-SCNC: 10 MMOL/L (ref 4–13)
AST SERPL W P-5'-P-CCNC: 19 U/L (ref 5–45)
BASOPHILS # BLD AUTO: 0.02 THOUSANDS/ΜL (ref 0–0.1)
BASOPHILS NFR BLD AUTO: 0 % (ref 0–1)
BILIRUB SERPL-MCNC: 0.93 MG/DL (ref 0.2–1)
BUN SERPL-MCNC: 12 MG/DL (ref 5–25)
CALCIUM SERPL-MCNC: 8.8 MG/DL (ref 8.3–10.1)
CHLORIDE SERPL-SCNC: 86 MMOL/L (ref 100–108)
CO2 SERPL-SCNC: 28 MMOL/L (ref 21–32)
CREAT SERPL-MCNC: 0.55 MG/DL (ref 0.6–1.3)
EOSINOPHIL # BLD AUTO: 0.02 THOUSAND/ΜL (ref 0–0.61)
EOSINOPHIL NFR BLD AUTO: 0 % (ref 0–6)
ERYTHROCYTE [DISTWIDTH] IN BLOOD BY AUTOMATED COUNT: 14.5 % (ref 11.6–15.1)
GFR SERPL CREATININE-BSD FRML MDRD: 92 ML/MIN/1.73SQ M
GLUCOSE P FAST SERPL-MCNC: 116 MG/DL (ref 65–99)
HCT VFR BLD AUTO: 35.3 % (ref 34.8–46.1)
HGB BLD-MCNC: 11.9 G/DL (ref 11.5–15.4)
LYMPHOCYTES # BLD AUTO: 0.79 THOUSANDS/ΜL (ref 0.6–4.47)
LYMPHOCYTES NFR BLD AUTO: 8 % (ref 14–44)
MCH RBC QN AUTO: 29 PG (ref 26.8–34.3)
MCHC RBC AUTO-ENTMCNC: 33.7 G/DL (ref 31.4–37.4)
MCV RBC AUTO: 86 FL (ref 82–98)
MONOCYTES # BLD AUTO: 0.71 THOUSAND/ΜL (ref 0.17–1.22)
MONOCYTES NFR BLD AUTO: 8 % (ref 4–12)
NEUTROPHILS # BLD AUTO: 7.77 THOUSANDS/ΜL (ref 1.85–7.62)
NEUTS SEG NFR BLD AUTO: 84 % (ref 43–75)
NRBC BLD AUTO-RTO: 0 /100 WBCS
PLATELET # BLD AUTO: 438 THOUSANDS/UL (ref 149–390)
PMV BLD AUTO: 9.6 FL (ref 8.9–12.7)
POTASSIUM SERPL-SCNC: 3.5 MMOL/L (ref 3.5–5.3)
PROT SERPL-MCNC: 7 G/DL (ref 6.4–8.2)
RBC # BLD AUTO: 4.1 MILLION/UL (ref 3.81–5.12)
SODIUM SERPL-SCNC: 124 MMOL/L (ref 136–145)
WBC # BLD AUTO: 9.41 THOUSAND/UL (ref 4.31–10.16)

## 2017-08-01 PROCEDURE — 36415 COLL VENOUS BLD VENIPUNCTURE: CPT

## 2017-08-01 PROCEDURE — 80053 COMPREHEN METABOLIC PANEL: CPT

## 2017-08-01 PROCEDURE — 85025 COMPLETE CBC W/AUTO DIFF WBC: CPT

## 2017-08-01 RX ORDER — SODIUM CHLORIDE 9 MG/ML
20 INJECTION, SOLUTION INTRAVENOUS CONTINUOUS
Status: DISCONTINUED | OUTPATIENT
Start: 2017-08-02 | End: 2017-08-05 | Stop reason: HOSPADM

## 2017-08-02 ENCOUNTER — HOSPITAL ENCOUNTER (OUTPATIENT)
Dept: INFUSION CENTER | Facility: CLINIC | Age: 77
Discharge: HOME | End: 2017-08-02
Payer: COMMERCIAL

## 2017-08-02 VITALS
DIASTOLIC BLOOD PRESSURE: 71 MMHG | BODY MASS INDEX: 16.61 KG/M2 | TEMPERATURE: 97.4 F | SYSTOLIC BLOOD PRESSURE: 141 MMHG | HEART RATE: 88 BPM | WEIGHT: 92.59 LBS | RESPIRATION RATE: 20 BRPM

## 2017-08-02 PROCEDURE — 96413 CHEMO IV INFUSION 1 HR: CPT

## 2017-08-02 PROCEDURE — 96367 TX/PROPH/DG ADDL SEQ IV INF: CPT

## 2017-08-02 RX ADMIN — SODIUM CHLORIDE 20 ML/HR: 0.9 INJECTION, SOLUTION INTRAVENOUS at 09:45

## 2017-08-02 RX ADMIN — PACLITAXEL 114 MG: 100 INJECTION, POWDER, LYOPHILIZED, FOR SUSPENSION INTRAVENOUS at 10:29

## 2017-08-02 RX ADMIN — DEXAMETHASONE SODIUM PHOSPHATE: 10 INJECTION, SOLUTION INTRAMUSCULAR; INTRAVENOUS at 09:45

## 2017-08-02 NOTE — PLAN OF CARE
Problem: Potential for Falls  Goal: Patient will remain free of falls  INTERVENTIONS:  - Assess patient frequently for physical needs  -  Identify cognitive and physical deficits and behaviors that affect risk of falls    -  [place] fall precautions as indicated by assessment   - Educate patient/family on patient safety including physical limitations  - Instruct patient to call for assistance with activity based on assessment  - Modify environment to reduce risk of injury  - Consider OT/PT consult to assist with strengthening/mobility   Outcome: Progressing

## 2017-08-08 ENCOUNTER — APPOINTMENT (OUTPATIENT)
Dept: LAB | Facility: CLINIC | Age: 77
End: 2017-08-08
Payer: COMMERCIAL

## 2017-08-08 DIAGNOSIS — C79.51 SECONDARY MALIGNANT NEOPLASM OF BONE (HCC): ICD-10-CM

## 2017-08-08 LAB
ALBUMIN SERPL BCP-MCNC: 2.6 G/DL (ref 3.5–5)
ALP SERPL-CCNC: 87 U/L (ref 46–116)
ALT SERPL W P-5'-P-CCNC: 23 U/L (ref 12–78)
ANION GAP SERPL CALCULATED.3IONS-SCNC: 7 MMOL/L (ref 4–13)
AST SERPL W P-5'-P-CCNC: 20 U/L (ref 5–45)
BASOPHILS # BLD AUTO: 0.02 THOUSANDS/ΜL (ref 0–0.1)
BASOPHILS NFR BLD AUTO: 1 % (ref 0–1)
BILIRUB SERPL-MCNC: 0.46 MG/DL (ref 0.2–1)
BUN SERPL-MCNC: 11 MG/DL (ref 5–25)
CALCIUM SERPL-MCNC: 8.9 MG/DL (ref 8.3–10.1)
CHLORIDE SERPL-SCNC: 92 MMOL/L (ref 100–108)
CO2 SERPL-SCNC: 28 MMOL/L (ref 21–32)
CREAT SERPL-MCNC: 0.43 MG/DL (ref 0.6–1.3)
EOSINOPHIL # BLD AUTO: 0.05 THOUSAND/ΜL (ref 0–0.61)
EOSINOPHIL NFR BLD AUTO: 1 % (ref 0–6)
ERYTHROCYTE [DISTWIDTH] IN BLOOD BY AUTOMATED COUNT: 14.8 % (ref 11.6–15.1)
GFR SERPL CREATININE-BSD FRML MDRD: 100 ML/MIN/1.73SQ M
GLUCOSE P FAST SERPL-MCNC: 102 MG/DL (ref 65–99)
HCT VFR BLD AUTO: 31.8 % (ref 34.8–46.1)
HGB BLD-MCNC: 10.4 G/DL (ref 11.5–15.4)
LYMPHOCYTES # BLD AUTO: 0.76 THOUSANDS/ΜL (ref 0.6–4.47)
LYMPHOCYTES NFR BLD AUTO: 22 % (ref 14–44)
MCH RBC QN AUTO: 28.6 PG (ref 26.8–34.3)
MCHC RBC AUTO-ENTMCNC: 32.7 G/DL (ref 31.4–37.4)
MCV RBC AUTO: 87 FL (ref 82–98)
MONOCYTES # BLD AUTO: 0.71 THOUSAND/ΜL (ref 0.17–1.22)
MONOCYTES NFR BLD AUTO: 20 % (ref 4–12)
NEUTROPHILS # BLD AUTO: 1.96 THOUSANDS/ΜL (ref 1.85–7.62)
NEUTS SEG NFR BLD AUTO: 56 % (ref 43–75)
NRBC BLD AUTO-RTO: 0 /100 WBCS
PLATELET # BLD AUTO: 496 THOUSANDS/UL (ref 149–390)
PMV BLD AUTO: 9.2 FL (ref 8.9–12.7)
POTASSIUM SERPL-SCNC: 3.6 MMOL/L (ref 3.5–5.3)
PROT SERPL-MCNC: 6.9 G/DL (ref 6.4–8.2)
RBC # BLD AUTO: 3.64 MILLION/UL (ref 3.81–5.12)
SODIUM SERPL-SCNC: 127 MMOL/L (ref 136–145)
WBC # BLD AUTO: 3.51 THOUSAND/UL (ref 4.31–10.16)

## 2017-08-08 PROCEDURE — 36415 COLL VENOUS BLD VENIPUNCTURE: CPT

## 2017-08-08 PROCEDURE — 80053 COMPREHEN METABOLIC PANEL: CPT

## 2017-08-08 PROCEDURE — 85025 COMPLETE CBC W/AUTO DIFF WBC: CPT

## 2017-08-08 RX ORDER — SODIUM CHLORIDE 9 MG/ML
20 INJECTION, SOLUTION INTRAVENOUS CONTINUOUS
Status: DISCONTINUED | OUTPATIENT
Start: 2017-08-09 | End: 2017-08-12 | Stop reason: HOSPADM

## 2017-08-09 ENCOUNTER — HOSPITAL ENCOUNTER (OUTPATIENT)
Dept: INFUSION CENTER | Facility: CLINIC | Age: 77
Discharge: HOME | End: 2017-08-09
Payer: COMMERCIAL

## 2017-08-09 VITALS
TEMPERATURE: 97.8 F | WEIGHT: 93.03 LBS | RESPIRATION RATE: 18 BRPM | BODY MASS INDEX: 16.69 KG/M2 | HEART RATE: 105 BPM | SYSTOLIC BLOOD PRESSURE: 143 MMHG | DIASTOLIC BLOOD PRESSURE: 92 MMHG

## 2017-08-09 PROCEDURE — 96367 TX/PROPH/DG ADDL SEQ IV INF: CPT

## 2017-08-09 PROCEDURE — 96413 CHEMO IV INFUSION 1 HR: CPT

## 2017-08-09 RX ADMIN — DEXAMETHASONE SODIUM PHOSPHATE: 10 INJECTION, SOLUTION INTRAMUSCULAR; INTRAVENOUS at 09:35

## 2017-08-09 RX ADMIN — PACLITAXEL 114 MG: 100 INJECTION, POWDER, LYOPHILIZED, FOR SUSPENSION INTRAVENOUS at 10:37

## 2017-08-09 RX ADMIN — SODIUM CHLORIDE 20 ML/HR: 0.9 INJECTION, SOLUTION INTRAVENOUS at 09:36

## 2017-08-09 NOTE — PROGRESS NOTES
Patient tolerated chemotherapy today without complciations  Patient aware of upcoming appointments   Declined AVS

## 2017-08-15 ENCOUNTER — ALLSCRIPTS OFFICE VISIT (OUTPATIENT)
Dept: OTHER | Facility: OTHER | Age: 77
End: 2017-08-15

## 2017-08-15 ENCOUNTER — APPOINTMENT (OUTPATIENT)
Dept: LAB | Facility: CLINIC | Age: 77
End: 2017-08-15
Payer: COMMERCIAL

## 2017-08-15 DIAGNOSIS — C79.51 SECONDARY MALIGNANT NEOPLASM OF BONE (HCC): ICD-10-CM

## 2017-08-15 LAB
ALBUMIN SERPL BCP-MCNC: 2.8 G/DL (ref 3.5–5)
ALP SERPL-CCNC: 117 U/L (ref 46–116)
ALT SERPL W P-5'-P-CCNC: 21 U/L (ref 12–78)
ANION GAP SERPL CALCULATED.3IONS-SCNC: 7 MMOL/L (ref 4–13)
AST SERPL W P-5'-P-CCNC: 12 U/L (ref 5–45)
BASOPHILS # BLD AUTO: 0.13 THOUSANDS/ΜL (ref 0–0.1)
BASOPHILS NFR BLD AUTO: 3 % (ref 0–1)
BILIRUB SERPL-MCNC: 0.37 MG/DL (ref 0.2–1)
BUN SERPL-MCNC: 11 MG/DL (ref 5–25)
CALCIUM SERPL-MCNC: 9.3 MG/DL (ref 8.3–10.1)
CHLORIDE SERPL-SCNC: 95 MMOL/L (ref 100–108)
CO2 SERPL-SCNC: 29 MMOL/L (ref 21–32)
CREAT SERPL-MCNC: 0.42 MG/DL (ref 0.6–1.3)
EOSINOPHIL # BLD AUTO: 0.07 THOUSAND/ΜL (ref 0–0.61)
EOSINOPHIL NFR BLD AUTO: 2 % (ref 0–6)
ERYTHROCYTE [DISTWIDTH] IN BLOOD BY AUTOMATED COUNT: 15.7 % (ref 11.6–15.1)
GFR SERPL CREATININE-BSD FRML MDRD: 101 ML/MIN/1.73SQ M
GLUCOSE P FAST SERPL-MCNC: 100 MG/DL (ref 65–99)
HCT VFR BLD AUTO: 33.5 % (ref 34.8–46.1)
HGB BLD-MCNC: 10.9 G/DL (ref 11.5–15.4)
LYMPHOCYTES # BLD AUTO: 1.12 THOUSANDS/ΜL (ref 0.6–4.47)
LYMPHOCYTES NFR BLD AUTO: 25 % (ref 14–44)
MCH RBC QN AUTO: 28.8 PG (ref 26.8–34.3)
MCHC RBC AUTO-ENTMCNC: 32.5 G/DL (ref 31.4–37.4)
MCV RBC AUTO: 88 FL (ref 82–98)
MONOCYTES # BLD AUTO: 0.69 THOUSAND/ΜL (ref 0.17–1.22)
MONOCYTES NFR BLD AUTO: 16 % (ref 4–12)
NEUTROPHILS # BLD AUTO: 2.39 THOUSANDS/ΜL (ref 1.85–7.62)
NEUTS SEG NFR BLD AUTO: 54 % (ref 43–75)
NRBC BLD AUTO-RTO: 0 /100 WBCS
PLATELET # BLD AUTO: 554 THOUSANDS/UL (ref 149–390)
PMV BLD AUTO: 8.9 FL (ref 8.9–12.7)
POTASSIUM SERPL-SCNC: 4.3 MMOL/L (ref 3.5–5.3)
PROT SERPL-MCNC: 7.3 G/DL (ref 6.4–8.2)
RBC # BLD AUTO: 3.79 MILLION/UL (ref 3.81–5.12)
SODIUM SERPL-SCNC: 131 MMOL/L (ref 136–145)
WBC # BLD AUTO: 4.44 THOUSAND/UL (ref 4.31–10.16)

## 2017-08-15 PROCEDURE — 80053 COMPREHEN METABOLIC PANEL: CPT

## 2017-08-15 PROCEDURE — 85025 COMPLETE CBC W/AUTO DIFF WBC: CPT

## 2017-08-15 PROCEDURE — 36415 COLL VENOUS BLD VENIPUNCTURE: CPT

## 2017-08-15 RX ORDER — SODIUM CHLORIDE 9 MG/ML
20 INJECTION, SOLUTION INTRAVENOUS CONTINUOUS
Status: DISCONTINUED | OUTPATIENT
Start: 2017-08-16 | End: 2017-08-19 | Stop reason: HOSPADM

## 2017-08-16 ENCOUNTER — HOSPITAL ENCOUNTER (OUTPATIENT)
Dept: INFUSION CENTER | Facility: CLINIC | Age: 77
Discharge: HOME | End: 2017-08-16
Payer: COMMERCIAL

## 2017-08-16 VITALS
TEMPERATURE: 97.8 F | DIASTOLIC BLOOD PRESSURE: 90 MMHG | RESPIRATION RATE: 18 BRPM | SYSTOLIC BLOOD PRESSURE: 160 MMHG | HEIGHT: 64 IN | HEART RATE: 60 BPM | BODY MASS INDEX: 15.96 KG/M2 | WEIGHT: 93.47 LBS

## 2017-08-16 PROCEDURE — 96413 CHEMO IV INFUSION 1 HR: CPT

## 2017-08-16 PROCEDURE — 96417 CHEMO IV INFUS EACH ADDL SEQ: CPT

## 2017-08-16 PROCEDURE — 96367 TX/PROPH/DG ADDL SEQ IV INF: CPT

## 2017-08-16 RX ADMIN — DEXAMETHASONE SODIUM PHOSPHATE: 10 INJECTION, SOLUTION INTRAMUSCULAR; INTRAVENOUS at 09:59

## 2017-08-16 RX ADMIN — CARBOPLATIN 366 MG: 10 INJECTION, SOLUTION INTRAVENOUS at 12:26

## 2017-08-16 RX ADMIN — PACLITAXEL 114 MG: 100 INJECTION, POWDER, LYOPHILIZED, FOR SUSPENSION INTRAVENOUS at 11:11

## 2017-08-16 RX ADMIN — SODIUM CHLORIDE 20 ML/HR: 0.9 INJECTION, SOLUTION INTRAVENOUS at 09:59

## 2017-08-16 NOTE — PLAN OF CARE
Problem: Potential for Falls  Goal: Patient will remain free of falls  INTERVENTIONS:  - Assess patient frequently for physical needs  -  Identify cognitive and physical deficits and behaviors that affect risk of falls    -  [place] fall precautions as indicated by assessment   - Educate patient/family on patient safety including physical limitations  - Instruct patient to call for assistance with activity based on assessment  - Modify environment to reduce risk of injury  - Consider OT/PT consult to assist with strengthening/mobility   Outcome: Progressing

## 2017-08-16 NOTE — PROGRESS NOTES
Pt here for chemotherapy today  Pt states the only discomfort she is having is in her left thigh area, describes as an "ache"  Pt states she did make Dr Keith Charles aware of this discomfort  Pt states she does not currently take anything for pain

## 2017-08-16 NOTE — PROGRESS NOTES
Pt tolerated chemo infusion today without incident  Pt provided with AVS and future appts reviewed with pt and her

## 2017-08-22 ENCOUNTER — APPOINTMENT (OUTPATIENT)
Dept: LAB | Facility: CLINIC | Age: 77
End: 2017-08-22
Payer: COMMERCIAL

## 2017-08-22 DIAGNOSIS — C34.91 MALIGNANT NEOPLASM OF UNSPECIFIED PART OF RIGHT BRONCHUS OR LUNG (HCC): ICD-10-CM

## 2017-08-22 LAB
ALBUMIN SERPL BCP-MCNC: 3.2 G/DL (ref 3.5–5)
ALP SERPL-CCNC: 109 U/L (ref 46–116)
ALT SERPL W P-5'-P-CCNC: 22 U/L (ref 12–78)
ANION GAP SERPL CALCULATED.3IONS-SCNC: 6 MMOL/L (ref 4–13)
AST SERPL W P-5'-P-CCNC: 15 U/L (ref 5–45)
BASOPHILS # BLD AUTO: 0.13 THOUSANDS/ΜL (ref 0–0.1)
BASOPHILS NFR BLD AUTO: 3 % (ref 0–1)
BILIRUB SERPL-MCNC: 0.46 MG/DL (ref 0.2–1)
BUN SERPL-MCNC: 10 MG/DL (ref 5–25)
CALCIUM SERPL-MCNC: 9.2 MG/DL (ref 8.3–10.1)
CHLORIDE SERPL-SCNC: 97 MMOL/L (ref 100–108)
CO2 SERPL-SCNC: 30 MMOL/L (ref 21–32)
CREAT SERPL-MCNC: 0.39 MG/DL (ref 0.6–1.3)
EOSINOPHIL # BLD AUTO: 0.21 THOUSAND/ΜL (ref 0–0.61)
EOSINOPHIL NFR BLD AUTO: 5 % (ref 0–6)
ERYTHROCYTE [DISTWIDTH] IN BLOOD BY AUTOMATED COUNT: 16.6 % (ref 11.6–15.1)
GFR SERPL CREATININE-BSD FRML MDRD: 103 ML/MIN/1.73SQ M
GLUCOSE P FAST SERPL-MCNC: 92 MG/DL (ref 65–99)
HCT VFR BLD AUTO: 34.8 % (ref 34.8–46.1)
HGB BLD-MCNC: 11.3 G/DL (ref 11.5–15.4)
LYMPHOCYTES # BLD AUTO: 1.04 THOUSANDS/ΜL (ref 0.6–4.47)
LYMPHOCYTES NFR BLD AUTO: 23 % (ref 14–44)
MCH RBC QN AUTO: 29.1 PG (ref 26.8–34.3)
MCHC RBC AUTO-ENTMCNC: 32.5 G/DL (ref 31.4–37.4)
MCV RBC AUTO: 90 FL (ref 82–98)
MONOCYTES # BLD AUTO: 0.3 THOUSAND/ΜL (ref 0.17–1.22)
MONOCYTES NFR BLD AUTO: 7 % (ref 4–12)
NEUTROPHILS # BLD AUTO: 2.75 THOUSANDS/ΜL (ref 1.85–7.62)
NEUTS SEG NFR BLD AUTO: 62 % (ref 43–75)
NRBC BLD AUTO-RTO: 0 /100 WBCS
PLATELET # BLD AUTO: 441 THOUSANDS/UL (ref 149–390)
PMV BLD AUTO: 9 FL (ref 8.9–12.7)
POTASSIUM SERPL-SCNC: 4 MMOL/L (ref 3.5–5.3)
PROT SERPL-MCNC: 7.6 G/DL (ref 6.4–8.2)
RBC # BLD AUTO: 3.88 MILLION/UL (ref 3.81–5.12)
SODIUM SERPL-SCNC: 133 MMOL/L (ref 136–145)
WBC # BLD AUTO: 4.46 THOUSAND/UL (ref 4.31–10.16)

## 2017-08-22 PROCEDURE — 80053 COMPREHEN METABOLIC PANEL: CPT

## 2017-08-22 PROCEDURE — 36415 COLL VENOUS BLD VENIPUNCTURE: CPT

## 2017-08-22 PROCEDURE — 85025 COMPLETE CBC W/AUTO DIFF WBC: CPT

## 2017-08-22 RX ORDER — SODIUM CHLORIDE 9 MG/ML
20 INJECTION, SOLUTION INTRAVENOUS CONTINUOUS
Status: DISCONTINUED | OUTPATIENT
Start: 2017-08-23 | End: 2017-08-26 | Stop reason: HOSPADM

## 2017-08-23 ENCOUNTER — HOSPITAL ENCOUNTER (OUTPATIENT)
Dept: INFUSION CENTER | Facility: CLINIC | Age: 77
Discharge: HOME | End: 2017-08-23
Payer: COMMERCIAL

## 2017-08-23 VITALS
RESPIRATION RATE: 22 BRPM | SYSTOLIC BLOOD PRESSURE: 156 MMHG | HEIGHT: 64 IN | DIASTOLIC BLOOD PRESSURE: 80 MMHG | WEIGHT: 91.16 LBS | HEART RATE: 79 BPM | BODY MASS INDEX: 15.56 KG/M2 | TEMPERATURE: 97.4 F

## 2017-08-23 PROCEDURE — 96413 CHEMO IV INFUSION 1 HR: CPT

## 2017-08-23 PROCEDURE — 96367 TX/PROPH/DG ADDL SEQ IV INF: CPT

## 2017-08-23 RX ORDER — CALCIUM POLYCARBOPHIL 625 MG 625 MG/1
625 TABLET ORAL DAILY
COMMUNITY
End: 2018-05-24

## 2017-08-23 RX ADMIN — DEXAMETHASONE SODIUM PHOSPHATE: 10 INJECTION, SOLUTION INTRAMUSCULAR; INTRAVENOUS at 09:56

## 2017-08-23 RX ADMIN — PACLITAXEL 114 MG: 100 INJECTION, POWDER, LYOPHILIZED, FOR SUSPENSION INTRAVENOUS at 10:27

## 2017-08-23 RX ADMIN — SODIUM CHLORIDE 20 ML/HR: 0.9 INJECTION, SOLUTION INTRAVENOUS at 09:56

## 2017-08-29 ENCOUNTER — TRANSCRIBE ORDERS (OUTPATIENT)
Dept: LAB | Facility: CLINIC | Age: 77
End: 2017-08-29

## 2017-08-29 ENCOUNTER — APPOINTMENT (OUTPATIENT)
Dept: LAB | Facility: CLINIC | Age: 77
End: 2017-08-29
Payer: COMMERCIAL

## 2017-08-29 DIAGNOSIS — C34.91 MALIGNANT NEOPLASM OF UNSPECIFIED PART OF RIGHT BRONCHUS OR LUNG (HCC): ICD-10-CM

## 2017-08-29 LAB
ALBUMIN SERPL BCP-MCNC: 3.5 G/DL (ref 3.5–5)
ALP SERPL-CCNC: 104 U/L (ref 46–116)
ALT SERPL W P-5'-P-CCNC: 20 U/L (ref 12–78)
ANION GAP SERPL CALCULATED.3IONS-SCNC: 8 MMOL/L (ref 4–13)
AST SERPL W P-5'-P-CCNC: 15 U/L (ref 5–45)
BASOPHILS # BLD AUTO: 0.06 THOUSANDS/ΜL (ref 0–0.1)
BASOPHILS NFR BLD AUTO: 2 % (ref 0–1)
BILIRUB SERPL-MCNC: 0.5 MG/DL (ref 0.2–1)
BUN SERPL-MCNC: 11 MG/DL (ref 5–25)
CALCIUM SERPL-MCNC: 9.1 MG/DL (ref 8.3–10.1)
CHLORIDE SERPL-SCNC: 96 MMOL/L (ref 100–108)
CO2 SERPL-SCNC: 28 MMOL/L (ref 21–32)
CREAT SERPL-MCNC: 0.41 MG/DL (ref 0.6–1.3)
EOSINOPHIL # BLD AUTO: 0.14 THOUSAND/ΜL (ref 0–0.61)
EOSINOPHIL NFR BLD AUTO: 4 % (ref 0–6)
ERYTHROCYTE [DISTWIDTH] IN BLOOD BY AUTOMATED COUNT: 17.4 % (ref 11.6–15.1)
GFR SERPL CREATININE-BSD FRML MDRD: 101 ML/MIN/1.73SQ M
GLUCOSE SERPL-MCNC: 93 MG/DL (ref 65–140)
HCT VFR BLD AUTO: 35.3 % (ref 34.8–46.1)
HGB BLD-MCNC: 11.5 G/DL (ref 11.5–15.4)
LYMPHOCYTES # BLD AUTO: 1.17 THOUSANDS/ΜL (ref 0.6–4.47)
LYMPHOCYTES NFR BLD AUTO: 32 % (ref 14–44)
MCH RBC QN AUTO: 29.3 PG (ref 26.8–34.3)
MCHC RBC AUTO-ENTMCNC: 32.6 G/DL (ref 31.4–37.4)
MCV RBC AUTO: 90 FL (ref 82–98)
MONOCYTES # BLD AUTO: 0.37 THOUSAND/ΜL (ref 0.17–1.22)
MONOCYTES NFR BLD AUTO: 10 % (ref 4–12)
NEUTROPHILS # BLD AUTO: 1.87 THOUSANDS/ΜL (ref 1.85–7.62)
NEUTS SEG NFR BLD AUTO: 52 % (ref 43–75)
NRBC BLD AUTO-RTO: 0 /100 WBCS
PLATELET # BLD AUTO: 437 THOUSANDS/UL (ref 149–390)
PMV BLD AUTO: 8.8 FL (ref 8.9–12.7)
POTASSIUM SERPL-SCNC: 3.6 MMOL/L (ref 3.5–5.3)
PROT SERPL-MCNC: 7.9 G/DL (ref 6.4–8.2)
RBC # BLD AUTO: 3.92 MILLION/UL (ref 3.81–5.12)
SODIUM SERPL-SCNC: 132 MMOL/L (ref 136–145)
WBC # BLD AUTO: 3.62 THOUSAND/UL (ref 4.31–10.16)

## 2017-08-29 PROCEDURE — 36415 COLL VENOUS BLD VENIPUNCTURE: CPT

## 2017-08-29 PROCEDURE — 85025 COMPLETE CBC W/AUTO DIFF WBC: CPT

## 2017-08-29 PROCEDURE — 80053 COMPREHEN METABOLIC PANEL: CPT

## 2017-08-29 RX ORDER — SODIUM CHLORIDE 9 MG/ML
20 INJECTION, SOLUTION INTRAVENOUS CONTINUOUS
Status: DISCONTINUED | OUTPATIENT
Start: 2017-08-30 | End: 2017-09-02 | Stop reason: HOSPADM

## 2017-08-30 ENCOUNTER — ALLSCRIPTS OFFICE VISIT (OUTPATIENT)
Dept: OTHER | Facility: OTHER | Age: 77
End: 2017-08-30

## 2017-08-30 ENCOUNTER — HOSPITAL ENCOUNTER (OUTPATIENT)
Dept: INFUSION CENTER | Facility: CLINIC | Age: 77
Discharge: HOME | End: 2017-08-30
Payer: COMMERCIAL

## 2017-08-30 VITALS
HEART RATE: 72 BPM | RESPIRATION RATE: 16 BRPM | SYSTOLIC BLOOD PRESSURE: 176 MMHG | DIASTOLIC BLOOD PRESSURE: 70 MMHG | TEMPERATURE: 98.4 F

## 2017-08-30 PROCEDURE — 96413 CHEMO IV INFUSION 1 HR: CPT

## 2017-08-30 PROCEDURE — 96367 TX/PROPH/DG ADDL SEQ IV INF: CPT

## 2017-08-30 RX ADMIN — PACLITAXEL 114 MG: 100 INJECTION, POWDER, LYOPHILIZED, FOR SUSPENSION INTRAVENOUS at 11:25

## 2017-08-30 RX ADMIN — SODIUM CHLORIDE 20 ML/HR: 0.9 INJECTION, SOLUTION INTRAVENOUS at 10:15

## 2017-08-30 RX ADMIN — DEXAMETHASONE SODIUM PHOSPHATE: 10 INJECTION, SOLUTION INTRAMUSCULAR; INTRAVENOUS at 10:15

## 2017-08-30 NOTE — PLAN OF CARE
Problem: Potential for Falls  Goal: Patient will remain free of falls  INTERVENTIONS:  - Assess patient frequently for physical needs  -  Identify cognitive and physical deficits and behaviors that affect risk of falls    -  [place] fall precautions as indicated by assessment   - Educate patient/family on patient safety including physical limitations  - Instruct patient to call for assistance with activity based on assessment  - Modify environment to reduce risk of injury  - Consider OT/PT consult to assist with strengthening/mobility   Outcome: Progressing

## 2017-08-30 NOTE — PROGRESS NOTES
Labs within order parameters  Pt tolerated Abraxane today without adverse reaction  Advised pt to see PCP regarding high blood pressure (see flowsheet), pt verbalized understanding  Pt is asymptomatic -  states he has been checking BP's at home  AVS given, pt aware of future appointments  Dr Enoc Nelson notified of pt's BP's   Pt has appt next week with Lisa Dougherty PA-C

## 2017-09-05 ENCOUNTER — ALLSCRIPTS OFFICE VISIT (OUTPATIENT)
Dept: OTHER | Facility: OTHER | Age: 77
End: 2017-09-05

## 2017-09-05 ENCOUNTER — APPOINTMENT (OUTPATIENT)
Dept: LAB | Facility: CLINIC | Age: 77
End: 2017-09-05
Payer: COMMERCIAL

## 2017-09-05 ENCOUNTER — TRANSCRIBE ORDERS (OUTPATIENT)
Dept: ADMINISTRATIVE | Facility: HOSPITAL | Age: 77
End: 2017-09-05

## 2017-09-05 ENCOUNTER — TRANSCRIBE ORDERS (OUTPATIENT)
Dept: LAB | Facility: CLINIC | Age: 77
End: 2017-09-05

## 2017-09-05 DIAGNOSIS — C34.91 PRIMARY LUNG CANCER WITH METASTASIS FROM LUNG TO OTHER SITE, RIGHT (HCC): Primary | ICD-10-CM

## 2017-09-05 DIAGNOSIS — C34.91 MALIGNANT NEOPLASM OF UNSPECIFIED PART OF RIGHT BRONCHUS OR LUNG (HCC): ICD-10-CM

## 2017-09-05 LAB
ALBUMIN SERPL BCP-MCNC: 3.2 G/DL (ref 3.5–5)
ALP SERPL-CCNC: 85 U/L (ref 46–116)
ALT SERPL W P-5'-P-CCNC: 16 U/L (ref 12–78)
ANION GAP SERPL CALCULATED.3IONS-SCNC: 11 MMOL/L (ref 4–13)
AST SERPL W P-5'-P-CCNC: 13 U/L (ref 5–45)
BASOPHILS # BLD AUTO: 0.02 THOUSANDS/ΜL (ref 0–0.1)
BASOPHILS NFR BLD AUTO: 0 % (ref 0–1)
BILIRUB SERPL-MCNC: 1.26 MG/DL (ref 0.2–1)
BUN SERPL-MCNC: 10 MG/DL (ref 5–25)
CALCIUM SERPL-MCNC: 9.2 MG/DL (ref 8.3–10.1)
CHLORIDE SERPL-SCNC: 94 MMOL/L (ref 100–108)
CO2 SERPL-SCNC: 23 MMOL/L (ref 21–32)
CREAT SERPL-MCNC: 0.42 MG/DL (ref 0.6–1.3)
EOSINOPHIL # BLD AUTO: 0.04 THOUSAND/ΜL (ref 0–0.61)
EOSINOPHIL NFR BLD AUTO: 1 % (ref 0–6)
ERYTHROCYTE [DISTWIDTH] IN BLOOD BY AUTOMATED COUNT: 18.7 % (ref 11.6–15.1)
GFR SERPL CREATININE-BSD FRML MDRD: 101 ML/MIN/1.73SQ M
GLUCOSE P FAST SERPL-MCNC: 96 MG/DL (ref 65–99)
HCT VFR BLD AUTO: 32.7 % (ref 34.8–46.1)
HGB BLD-MCNC: 10.8 G/DL (ref 11.5–15.4)
LYMPHOCYTES # BLD AUTO: 1.16 THOUSANDS/ΜL (ref 0.6–4.47)
LYMPHOCYTES NFR BLD AUTO: 21 % (ref 14–44)
MCH RBC QN AUTO: 29.7 PG (ref 26.8–34.3)
MCHC RBC AUTO-ENTMCNC: 33 G/DL (ref 31.4–37.4)
MCV RBC AUTO: 90 FL (ref 82–98)
MONOCYTES # BLD AUTO: 0.81 THOUSAND/ΜL (ref 0.17–1.22)
MONOCYTES NFR BLD AUTO: 15 % (ref 4–12)
NEUTROPHILS # BLD AUTO: 3.37 THOUSANDS/ΜL (ref 1.85–7.62)
NEUTS SEG NFR BLD AUTO: 63 % (ref 43–75)
NRBC BLD AUTO-RTO: 0 /100 WBCS
PLATELET # BLD AUTO: 375 THOUSANDS/UL (ref 149–390)
PMV BLD AUTO: 9.1 FL (ref 8.9–12.7)
POTASSIUM SERPL-SCNC: 3.3 MMOL/L (ref 3.5–5.3)
PROT SERPL-MCNC: 7.5 G/DL (ref 6.4–8.2)
RBC # BLD AUTO: 3.64 MILLION/UL (ref 3.81–5.12)
SODIUM SERPL-SCNC: 128 MMOL/L (ref 136–145)
WBC # BLD AUTO: 5.42 THOUSAND/UL (ref 4.31–10.16)

## 2017-09-05 PROCEDURE — 80053 COMPREHEN METABOLIC PANEL: CPT

## 2017-09-05 PROCEDURE — 36415 COLL VENOUS BLD VENIPUNCTURE: CPT

## 2017-09-05 PROCEDURE — 85025 COMPLETE CBC W/AUTO DIFF WBC: CPT

## 2017-09-05 RX ORDER — SODIUM CHLORIDE 9 MG/ML
20 INJECTION, SOLUTION INTRAVENOUS CONTINUOUS
Status: DISCONTINUED | OUTPATIENT
Start: 2017-09-06 | End: 2017-09-09 | Stop reason: HOSPADM

## 2017-09-06 ENCOUNTER — HOSPITAL ENCOUNTER (OUTPATIENT)
Dept: INFUSION CENTER | Facility: CLINIC | Age: 77
Discharge: HOME | End: 2017-09-06
Payer: COMMERCIAL

## 2017-09-06 VITALS
TEMPERATURE: 96.3 F | HEIGHT: 64 IN | WEIGHT: 93.92 LBS | DIASTOLIC BLOOD PRESSURE: 74 MMHG | HEART RATE: 74 BPM | SYSTOLIC BLOOD PRESSURE: 152 MMHG | RESPIRATION RATE: 18 BRPM | BODY MASS INDEX: 16.03 KG/M2

## 2017-09-06 PROCEDURE — 96375 TX/PRO/DX INJ NEW DRUG ADDON: CPT

## 2017-09-06 PROCEDURE — 96417 CHEMO IV INFUS EACH ADDL SEQ: CPT

## 2017-09-06 PROCEDURE — 96413 CHEMO IV INFUSION 1 HR: CPT

## 2017-09-06 RX ADMIN — PACLITAXEL 113 MG: 100 INJECTION, POWDER, LYOPHILIZED, FOR SUSPENSION INTRAVENOUS at 13:24

## 2017-09-06 RX ADMIN — DEXAMETHASONE SODIUM PHOSPHATE: 10 INJECTION, SOLUTION INTRAMUSCULAR; INTRAVENOUS at 12:45

## 2017-09-06 RX ADMIN — SODIUM CHLORIDE 20 ML/HR: 0.9 INJECTION, SOLUTION INTRAVENOUS at 12:45

## 2017-09-06 RX ADMIN — CARBOPLATIN 358 MG: 10 INJECTION, SOLUTION INTRAVENOUS at 14:03

## 2017-09-06 NOTE — PLAN OF CARE
Problem: Potential for Falls  Goal: Patient will remain free of falls  INTERVENTIONS:  - Assess patient frequently for physical needs  -  Identify cognitive and physical deficits and behaviors that affect risk of falls    -  [place] fall precautions as indicated by assessment   - Educate patient/family on patient safety including physical limitations  - Instruct patient to call for assistance with activity based on assessment  - Modify environment to reduce risk of injury  - Consider OT/PT consult to assist with strengthening/mobility   Outcome: Progressing

## 2017-09-06 NOTE — PROGRESS NOTES
Pt arrived to unit for treatment  Stated that she saw her PCP last Wednesday regarding her blood pressure  Her PCP wants to speak with Dr Kori Arevalo before prescribing any blood pressure medications  No word back from Dr Kori Arevalo as of today (on vacation)  Sherie RN made aware of decreased potassium and increased bilirubin today  Sherie spoke with Dr Belem Nieves regarding the same, and stated that she was ok for treatment today  Encouraged to eat foods high in potassium

## 2017-09-06 NOTE — PROGRESS NOTES
Pt received treatment without complications  Discharged in stable condition  Aware of next infusion appointment  AVS provided

## 2017-09-12 ENCOUNTER — TRANSCRIBE ORDERS (OUTPATIENT)
Dept: LAB | Facility: CLINIC | Age: 77
End: 2017-09-12

## 2017-09-12 ENCOUNTER — APPOINTMENT (OUTPATIENT)
Dept: LAB | Facility: CLINIC | Age: 77
End: 2017-09-12
Payer: COMMERCIAL

## 2017-09-12 DIAGNOSIS — C34.91 MALIGNANT NEOPLASM OF UNSPECIFIED PART OF RIGHT BRONCHUS OR LUNG (HCC): ICD-10-CM

## 2017-09-12 LAB
ALBUMIN SERPL BCP-MCNC: 3.3 G/DL (ref 3.5–5)
ALP SERPL-CCNC: 85 U/L (ref 46–116)
ALT SERPL W P-5'-P-CCNC: 12 U/L (ref 12–78)
ANION GAP SERPL CALCULATED.3IONS-SCNC: 8 MMOL/L (ref 4–13)
AST SERPL W P-5'-P-CCNC: 11 U/L (ref 5–45)
BASOPHILS # BLD AUTO: 0.03 THOUSANDS/ΜL (ref 0–0.1)
BASOPHILS NFR BLD AUTO: 1 % (ref 0–1)
BILIRUB SERPL-MCNC: 0.68 MG/DL (ref 0.2–1)
BUN SERPL-MCNC: 9 MG/DL (ref 5–25)
CALCIUM SERPL-MCNC: 9.3 MG/DL (ref 8.3–10.1)
CHLORIDE SERPL-SCNC: 92 MMOL/L (ref 100–108)
CO2 SERPL-SCNC: 28 MMOL/L (ref 21–32)
CREAT SERPL-MCNC: 0.34 MG/DL (ref 0.6–1.3)
EOSINOPHIL # BLD AUTO: 0.04 THOUSAND/ΜL (ref 0–0.61)
EOSINOPHIL NFR BLD AUTO: 1 % (ref 0–6)
ERYTHROCYTE [DISTWIDTH] IN BLOOD BY AUTOMATED COUNT: 18.3 % (ref 11.6–15.1)
GFR SERPL CREATININE-BSD FRML MDRD: 108 ML/MIN/1.73SQ M
GLUCOSE P FAST SERPL-MCNC: 98 MG/DL (ref 65–99)
HCT VFR BLD AUTO: 34.2 % (ref 34.8–46.1)
HGB BLD-MCNC: 11.2 G/DL (ref 11.5–15.4)
LYMPHOCYTES # BLD AUTO: 0.95 THOUSANDS/ΜL (ref 0.6–4.47)
LYMPHOCYTES NFR BLD AUTO: 16 % (ref 14–44)
MCH RBC QN AUTO: 29.4 PG (ref 26.8–34.3)
MCHC RBC AUTO-ENTMCNC: 32.7 G/DL (ref 31.4–37.4)
MCV RBC AUTO: 90 FL (ref 82–98)
MONOCYTES # BLD AUTO: 0.72 THOUSAND/ΜL (ref 0.17–1.22)
MONOCYTES NFR BLD AUTO: 12 % (ref 4–12)
NEUTROPHILS # BLD AUTO: 4.04 THOUSANDS/ΜL (ref 1.85–7.62)
NEUTS SEG NFR BLD AUTO: 70 % (ref 43–75)
NRBC BLD AUTO-RTO: 0 /100 WBCS
PLATELET # BLD AUTO: 378 THOUSANDS/UL (ref 149–390)
PMV BLD AUTO: 9.2 FL (ref 8.9–12.7)
POTASSIUM SERPL-SCNC: 3.5 MMOL/L (ref 3.5–5.3)
PROT SERPL-MCNC: 7.8 G/DL (ref 6.4–8.2)
RBC # BLD AUTO: 3.81 MILLION/UL (ref 3.81–5.12)
SODIUM SERPL-SCNC: 128 MMOL/L (ref 136–145)
WBC # BLD AUTO: 5.82 THOUSAND/UL (ref 4.31–10.16)

## 2017-09-12 PROCEDURE — 85025 COMPLETE CBC W/AUTO DIFF WBC: CPT

## 2017-09-12 PROCEDURE — 36415 COLL VENOUS BLD VENIPUNCTURE: CPT

## 2017-09-12 PROCEDURE — 80053 COMPREHEN METABOLIC PANEL: CPT

## 2017-09-12 RX ORDER — SODIUM CHLORIDE 9 MG/ML
20 INJECTION, SOLUTION INTRAVENOUS CONTINUOUS
Status: DISCONTINUED | OUTPATIENT
Start: 2017-09-13 | End: 2017-09-16 | Stop reason: HOSPADM

## 2017-09-13 ENCOUNTER — GENERIC CONVERSION - ENCOUNTER (OUTPATIENT)
Dept: OTHER | Facility: OTHER | Age: 77
End: 2017-09-13

## 2017-09-13 ENCOUNTER — HOSPITAL ENCOUNTER (OUTPATIENT)
Dept: INFUSION CENTER | Facility: CLINIC | Age: 77
Discharge: HOME | End: 2017-09-13
Payer: COMMERCIAL

## 2017-09-13 VITALS
TEMPERATURE: 96.6 F | HEART RATE: 79 BPM | BODY MASS INDEX: 16.26 KG/M2 | WEIGHT: 95.24 LBS | SYSTOLIC BLOOD PRESSURE: 154 MMHG | RESPIRATION RATE: 16 BRPM | HEIGHT: 64 IN | DIASTOLIC BLOOD PRESSURE: 94 MMHG

## 2017-09-13 PROCEDURE — 96413 CHEMO IV INFUSION 1 HR: CPT

## 2017-09-13 PROCEDURE — 96375 TX/PRO/DX INJ NEW DRUG ADDON: CPT

## 2017-09-13 RX ADMIN — DEXAMETHASONE SODIUM PHOSPHATE: 10 INJECTION, SOLUTION INTRAMUSCULAR; INTRAVENOUS at 09:50

## 2017-09-13 RX ADMIN — SODIUM CHLORIDE 20 ML/HR: 0.9 INJECTION, SOLUTION INTRAVENOUS at 09:50

## 2017-09-13 RX ADMIN — PACLITAXEL 113 MG: 100 INJECTION, POWDER, LYOPHILIZED, FOR SUSPENSION INTRAVENOUS at 10:48

## 2017-09-13 NOTE — PLAN OF CARE
Problem: Potential for Falls  Goal: Patient will remain free of falls  INTERVENTIONS:  - Assess patient frequently for physical needs  -  Identify cognitive and physical deficits and behaviors that affect risk of falls  -  [place] fall precautions as indicated by assessment   - Educate patient/family on patient safety including physical limitations  - Instruct patient to call for assistance with activity based on assessment  - Modify environment to reduce risk of injury  - Consider OT/PT consult to assist with strengthening/mobility   Outcome: Progressing      Problem: Knowledge Deficit  Goal: Patient/family/caregiver demonstrates understanding of disease process, treatment plan, medications, and discharge instructions  Complete learning assessment and assess knowledge base    Interventions:  - Provide teaching at level of understanding  - Provide teaching via preferred learning methods   Outcome: Progressing

## 2017-09-19 ENCOUNTER — APPOINTMENT (OUTPATIENT)
Dept: LAB | Facility: CLINIC | Age: 77
End: 2017-09-19
Payer: COMMERCIAL

## 2017-09-19 DIAGNOSIS — C79.51 SECONDARY MALIGNANT NEOPLASM OF BONE (HCC): ICD-10-CM

## 2017-09-19 LAB
ALBUMIN SERPL BCP-MCNC: 3.2 G/DL (ref 3.5–5)
ALP SERPL-CCNC: 90 U/L (ref 46–116)
ALT SERPL W P-5'-P-CCNC: 15 U/L (ref 12–78)
ANION GAP SERPL CALCULATED.3IONS-SCNC: 9 MMOL/L (ref 4–13)
AST SERPL W P-5'-P-CCNC: 15 U/L (ref 5–45)
BASOPHILS # BLD AUTO: 0.04 THOUSANDS/ΜL (ref 0–0.1)
BASOPHILS NFR BLD AUTO: 1 % (ref 0–1)
BILIRUB SERPL-MCNC: 0.58 MG/DL (ref 0.2–1)
BUN SERPL-MCNC: 10 MG/DL (ref 5–25)
CALCIUM SERPL-MCNC: 9.2 MG/DL (ref 8.3–10.1)
CHLORIDE SERPL-SCNC: 97 MMOL/L (ref 100–108)
CO2 SERPL-SCNC: 27 MMOL/L (ref 21–32)
CREAT SERPL-MCNC: 0.51 MG/DL (ref 0.6–1.3)
EOSINOPHIL # BLD AUTO: 0.05 THOUSAND/ΜL (ref 0–0.61)
EOSINOPHIL NFR BLD AUTO: 1 % (ref 0–6)
ERYTHROCYTE [DISTWIDTH] IN BLOOD BY AUTOMATED COUNT: 19.4 % (ref 11.6–15.1)
GFR SERPL CREATININE-BSD FRML MDRD: 94 ML/MIN/1.73SQ M
GLUCOSE SERPL-MCNC: 118 MG/DL (ref 65–140)
HCT VFR BLD AUTO: 33 % (ref 34.8–46.1)
HGB BLD-MCNC: 10.7 G/DL (ref 11.5–15.4)
LYMPHOCYTES # BLD AUTO: 0.97 THOUSANDS/ΜL (ref 0.6–4.47)
LYMPHOCYTES NFR BLD AUTO: 15 % (ref 14–44)
MCH RBC QN AUTO: 29.6 PG (ref 26.8–34.3)
MCHC RBC AUTO-ENTMCNC: 32.4 G/DL (ref 31.4–37.4)
MCV RBC AUTO: 91 FL (ref 82–98)
MONOCYTES # BLD AUTO: 0.91 THOUSAND/ΜL (ref 0.17–1.22)
MONOCYTES NFR BLD AUTO: 14 % (ref 4–12)
NEUTROPHILS # BLD AUTO: 4.65 THOUSANDS/ΜL (ref 1.85–7.62)
NEUTS SEG NFR BLD AUTO: 69 % (ref 43–75)
NRBC BLD AUTO-RTO: 0 /100 WBCS
PLATELET # BLD AUTO: 537 THOUSANDS/UL (ref 149–390)
PMV BLD AUTO: 9.2 FL (ref 8.9–12.7)
POTASSIUM SERPL-SCNC: 3.1 MMOL/L (ref 3.5–5.3)
PROT SERPL-MCNC: 7.5 G/DL (ref 6.4–8.2)
RBC # BLD AUTO: 3.62 MILLION/UL (ref 3.81–5.12)
SODIUM SERPL-SCNC: 133 MMOL/L (ref 136–145)
WBC # BLD AUTO: 6.65 THOUSAND/UL (ref 4.31–10.16)

## 2017-09-19 PROCEDURE — 80053 COMPREHEN METABOLIC PANEL: CPT

## 2017-09-19 PROCEDURE — 85025 COMPLETE CBC W/AUTO DIFF WBC: CPT

## 2017-09-19 PROCEDURE — 36415 COLL VENOUS BLD VENIPUNCTURE: CPT

## 2017-09-19 RX ORDER — SODIUM CHLORIDE 9 MG/ML
20 INJECTION, SOLUTION INTRAVENOUS CONTINUOUS
Status: DISCONTINUED | OUTPATIENT
Start: 2017-09-20 | End: 2017-09-23 | Stop reason: HOSPADM

## 2017-09-20 ENCOUNTER — HOSPITAL ENCOUNTER (OUTPATIENT)
Dept: INFUSION CENTER | Facility: CLINIC | Age: 77
Discharge: HOME | End: 2017-09-20
Payer: COMMERCIAL

## 2017-09-20 VITALS
SYSTOLIC BLOOD PRESSURE: 168 MMHG | TEMPERATURE: 98.2 F | HEART RATE: 96 BPM | RESPIRATION RATE: 16 BRPM | DIASTOLIC BLOOD PRESSURE: 90 MMHG

## 2017-09-20 PROCEDURE — 96375 TX/PRO/DX INJ NEW DRUG ADDON: CPT

## 2017-09-20 PROCEDURE — 96413 CHEMO IV INFUSION 1 HR: CPT

## 2017-09-20 RX ADMIN — DEXAMETHASONE SODIUM PHOSPHATE: 10 INJECTION, SOLUTION INTRAMUSCULAR; INTRAVENOUS at 09:45

## 2017-09-20 RX ADMIN — SODIUM CHLORIDE 20 ML/HR: 0.9 INJECTION, SOLUTION INTRAVENOUS at 09:40

## 2017-09-20 RX ADMIN — PACLITAXEL 113 MG: 100 INJECTION, POWDER, LYOPHILIZED, FOR SUSPENSION INTRAVENOUS at 10:56

## 2017-09-20 NOTE — PROGRESS NOTES
Pt here for abraxane  Potassium 3 1  Sherie RN notified  Pt instructed to eat foods high in potassium  Treatment received with no adverse events  Aware of future appointments

## 2017-09-26 ENCOUNTER — HOSPITAL ENCOUNTER (OUTPATIENT)
Dept: RADIOLOGY | Age: 77
Discharge: HOME | End: 2017-09-26
Payer: COMMERCIAL

## 2017-09-26 ENCOUNTER — APPOINTMENT (OUTPATIENT)
Dept: LAB | Facility: CLINIC | Age: 77
End: 2017-09-26
Payer: COMMERCIAL

## 2017-09-26 VITALS — BODY MASS INDEX: 15.8 KG/M2 | WEIGHT: 92 LBS

## 2017-09-26 DIAGNOSIS — C34.91 PRIMARY LUNG CANCER WITH METASTASIS FROM LUNG TO OTHER SITE, RIGHT (HCC): ICD-10-CM

## 2017-09-26 DIAGNOSIS — C34.91 MALIGNANT NEOPLASM OF UNSPECIFIED PART OF RIGHT BRONCHUS OR LUNG (HCC): ICD-10-CM

## 2017-09-26 LAB
ALBUMIN SERPL BCP-MCNC: 3.5 G/DL (ref 3.5–5)
ALP SERPL-CCNC: 97 U/L (ref 46–116)
ALT SERPL W P-5'-P-CCNC: 16 U/L (ref 12–78)
ANION GAP SERPL CALCULATED.3IONS-SCNC: 9 MMOL/L (ref 4–13)
AST SERPL W P-5'-P-CCNC: 13 U/L (ref 5–45)
BASOPHILS # BLD AUTO: 0.07 THOUSANDS/ΜL (ref 0–0.1)
BASOPHILS NFR BLD AUTO: 1 % (ref 0–1)
BILIRUB SERPL-MCNC: 0.5 MG/DL (ref 0.2–1)
BUN SERPL-MCNC: 9 MG/DL (ref 5–25)
CALCIUM SERPL-MCNC: 9.4 MG/DL (ref 8.3–10.1)
CHLORIDE SERPL-SCNC: 94 MMOL/L (ref 100–108)
CO2 SERPL-SCNC: 28 MMOL/L (ref 21–32)
CREAT SERPL-MCNC: 0.43 MG/DL (ref 0.6–1.3)
EOSINOPHIL # BLD AUTO: 0.07 THOUSAND/ΜL (ref 0–0.61)
EOSINOPHIL NFR BLD AUTO: 1 % (ref 0–6)
ERYTHROCYTE [DISTWIDTH] IN BLOOD BY AUTOMATED COUNT: 19.5 % (ref 11.6–15.1)
GFR SERPL CREATININE-BSD FRML MDRD: 100 ML/MIN/1.73SQ M
GLUCOSE SERPL-MCNC: 109 MG/DL (ref 65–140)
GLUCOSE SERPL-MCNC: 97 MG/DL (ref 65–140)
HCT VFR BLD AUTO: 33.7 % (ref 34.8–46.1)
HGB BLD-MCNC: 11.2 G/DL (ref 11.5–15.4)
LYMPHOCYTES # BLD AUTO: 1.02 THOUSANDS/ΜL (ref 0.6–4.47)
LYMPHOCYTES NFR BLD AUTO: 18 % (ref 14–44)
MCH RBC QN AUTO: 29.9 PG (ref 26.8–34.3)
MCHC RBC AUTO-ENTMCNC: 33.2 G/DL (ref 31.4–37.4)
MCV RBC AUTO: 90 FL (ref 82–98)
MONOCYTES # BLD AUTO: 0.87 THOUSAND/ΜL (ref 0.17–1.22)
MONOCYTES NFR BLD AUTO: 15 % (ref 4–12)
NEUTROPHILS # BLD AUTO: 3.71 THOUSANDS/ΜL (ref 1.85–7.62)
NEUTS SEG NFR BLD AUTO: 65 % (ref 43–75)
NRBC BLD AUTO-RTO: 0 /100 WBCS
PLATELET # BLD AUTO: 516 THOUSANDS/UL (ref 149–390)
PMV BLD AUTO: 8.7 FL (ref 8.9–12.7)
POTASSIUM SERPL-SCNC: 3.6 MMOL/L (ref 3.5–5.3)
PROT SERPL-MCNC: 7.6 G/DL (ref 6.4–8.2)
RBC # BLD AUTO: 3.75 MILLION/UL (ref 3.81–5.12)
SODIUM SERPL-SCNC: 131 MMOL/L (ref 136–145)
WBC # BLD AUTO: 5.78 THOUSAND/UL (ref 4.31–10.16)

## 2017-09-26 PROCEDURE — 85025 COMPLETE CBC W/AUTO DIFF WBC: CPT

## 2017-09-26 PROCEDURE — A9552 F18 FDG: HCPCS

## 2017-09-26 PROCEDURE — 82948 REAGENT STRIP/BLOOD GLUCOSE: CPT

## 2017-09-26 PROCEDURE — 78815 PET IMAGE W/CT SKULL-THIGH: CPT

## 2017-09-26 PROCEDURE — 36415 COLL VENOUS BLD VENIPUNCTURE: CPT

## 2017-09-26 PROCEDURE — 80053 COMPREHEN METABOLIC PANEL: CPT

## 2017-09-26 RX ORDER — SODIUM CHLORIDE 9 MG/ML
20 INJECTION, SOLUTION INTRAVENOUS CONTINUOUS
Status: DISCONTINUED | OUTPATIENT
Start: 2017-09-27 | End: 2017-09-30 | Stop reason: HOSPADM

## 2017-09-26 RX ADMIN — IOHEXOL 5 ML: 240 INJECTION, SOLUTION INTRATHECAL; INTRAVASCULAR; INTRAVENOUS; ORAL at 12:20

## 2017-09-27 ENCOUNTER — HOSPITAL ENCOUNTER (OUTPATIENT)
Dept: INFUSION CENTER | Facility: CLINIC | Age: 77
Discharge: HOME | End: 2017-09-27
Payer: COMMERCIAL

## 2017-09-27 VITALS
BODY MASS INDEX: 15.96 KG/M2 | DIASTOLIC BLOOD PRESSURE: 80 MMHG | HEART RATE: 90 BPM | WEIGHT: 93.5 LBS | HEIGHT: 64 IN | RESPIRATION RATE: 16 BRPM | TEMPERATURE: 98 F | SYSTOLIC BLOOD PRESSURE: 140 MMHG

## 2017-09-27 PROCEDURE — 96413 CHEMO IV INFUSION 1 HR: CPT

## 2017-09-27 PROCEDURE — 96417 CHEMO IV INFUS EACH ADDL SEQ: CPT

## 2017-09-27 PROCEDURE — 96367 TX/PROPH/DG ADDL SEQ IV INF: CPT

## 2017-09-27 PROCEDURE — 96375 TX/PRO/DX INJ NEW DRUG ADDON: CPT

## 2017-09-27 RX ORDER — LORAZEPAM 2 MG/ML
1 INJECTION INTRAMUSCULAR ONCE
Status: COMPLETED | OUTPATIENT
Start: 2017-09-27 | End: 2017-09-27

## 2017-09-27 RX ADMIN — SODIUM CHLORIDE 20 ML/HR: 0.9 INJECTION, SOLUTION INTRAVENOUS at 10:05

## 2017-09-27 RX ADMIN — DEXAMETHASONE SODIUM PHOSPHATE: 10 INJECTION, SOLUTION INTRAMUSCULAR; INTRAVENOUS at 11:49

## 2017-09-27 RX ADMIN — LORAZEPAM 1 MG: 2 INJECTION INTRAMUSCULAR; INTRAVENOUS at 11:31

## 2017-09-27 RX ADMIN — CARBOPLATIN 358 MG: 10 INJECTION, SOLUTION INTRAVENOUS at 14:05

## 2017-09-27 RX ADMIN — PACLITAXEL 113 MG: 100 INJECTION, POWDER, LYOPHILIZED, FOR SUSPENSION INTRAVENOUS at 13:20

## 2017-09-27 NOTE — PLAN OF CARE
Problem: PAIN - ADULT  Goal: Verbalizes/displays adequate comfort level or baseline comfort level  Interventions:  - Encourage patient to monitor pain and request assistance  - Assess pain using appropriate pain scale  - Administer analgesics based on type and severity of pain and evaluate response  - Implement non-pharmacological measures as appropriate and evaluate response  - Consider cultural and social influences on pain and pain management  - Notify physician/advanced practitioner if interventions unsuccessful or patient reports new pain  Outcome: Progressing      Problem: INFECTION - ADULT  Goal: Absence or prevention of progression during hospitalization  INTERVENTIONS:  - Assess and monitor for signs and symptoms of infection  - Monitor lab/diagnostic results  - Monitor all insertion sites, i e  indwelling lines, tubes, and drains  - Monitor endotracheal (as able) and nasal secretions for changes in amount and color  - [place] appropriate cooling/warming therapies per order  - Administer medications as ordered  - Instruct and encourage patient and family to use good hand hygiene technique  - Identify and instruct in appropriate isolation precautions for identified infection/condition  Outcome: Progressing    Goal: Absence of fever/infection during neutropenic period  INTERVENTIONS:  - Monitor WBC  - Implement neutropenic guidelines  Outcome: Progressing      Problem: Knowledge Deficit  Goal: Patient/family/caregiver demonstrates understanding of disease process, treatment plan, medications, and discharge instructions  Complete learning assessment and assess knowledge base    Interventions:  - Provide teaching at level of understanding  - Provide teaching via preferred learning methods  Outcome: Progressing

## 2017-09-27 NOTE — PROGRESS NOTES
Pt  Denies new symptoms or concerns at this time  Pt  States she is not sleeping well, but does not always take her Ambien  /100 manually in Lt  Arm on admission to infusion center  IV access obtained; BP repeated after 20 minutes  180/100  Notified the office of Dr Maria Luz Herrera; spoke with Naveen Sewell RN  Ativan ordered  Written order to follow

## 2017-10-03 ENCOUNTER — APPOINTMENT (OUTPATIENT)
Dept: LAB | Facility: CLINIC | Age: 77
End: 2017-10-03
Payer: COMMERCIAL

## 2017-10-03 DIAGNOSIS — C34.91 MALIGNANT NEOPLASM OF UNSPECIFIED PART OF RIGHT BRONCHUS OR LUNG (HCC): ICD-10-CM

## 2017-10-03 LAB
ALBUMIN SERPL BCP-MCNC: 3.4 G/DL (ref 3.5–5)
ALP SERPL-CCNC: 88 U/L (ref 46–116)
ALT SERPL W P-5'-P-CCNC: 18 U/L (ref 12–78)
ANION GAP SERPL CALCULATED.3IONS-SCNC: 7 MMOL/L (ref 4–13)
AST SERPL W P-5'-P-CCNC: 14 U/L (ref 5–45)
BASOPHILS # BLD AUTO: 0.06 THOUSANDS/ΜL (ref 0–0.1)
BASOPHILS NFR BLD AUTO: 1 % (ref 0–1)
BILIRUB SERPL-MCNC: 0.55 MG/DL (ref 0.2–1)
BUN SERPL-MCNC: 8 MG/DL (ref 5–25)
CALCIUM SERPL-MCNC: 9 MG/DL (ref 8.3–10.1)
CHLORIDE SERPL-SCNC: 93 MMOL/L (ref 100–108)
CO2 SERPL-SCNC: 29 MMOL/L (ref 21–32)
CREAT SERPL-MCNC: 0.51 MG/DL (ref 0.6–1.3)
EOSINOPHIL # BLD AUTO: 0.05 THOUSAND/ΜL (ref 0–0.61)
EOSINOPHIL NFR BLD AUTO: 1 % (ref 0–6)
ERYTHROCYTE [DISTWIDTH] IN BLOOD BY AUTOMATED COUNT: 19.7 % (ref 11.6–15.1)
GFR SERPL CREATININE-BSD FRML MDRD: 94 ML/MIN/1.73SQ M
GLUCOSE P FAST SERPL-MCNC: 135 MG/DL (ref 65–99)
HCT VFR BLD AUTO: 33.3 % (ref 34.8–46.1)
HGB BLD-MCNC: 11.1 G/DL (ref 11.5–15.4)
LYMPHOCYTES # BLD AUTO: 0.86 THOUSANDS/ΜL (ref 0.6–4.47)
LYMPHOCYTES NFR BLD AUTO: 19 % (ref 14–44)
MCH RBC QN AUTO: 30.3 PG (ref 26.8–34.3)
MCHC RBC AUTO-ENTMCNC: 33.3 G/DL (ref 31.4–37.4)
MCV RBC AUTO: 91 FL (ref 82–98)
MONOCYTES # BLD AUTO: 0.65 THOUSAND/ΜL (ref 0.17–1.22)
MONOCYTES NFR BLD AUTO: 15 % (ref 4–12)
NEUTROPHILS # BLD AUTO: 2.84 THOUSANDS/ΜL (ref 1.85–7.62)
NEUTS SEG NFR BLD AUTO: 64 % (ref 43–75)
NRBC BLD AUTO-RTO: 0 /100 WBCS
PLATELET # BLD AUTO: 416 THOUSANDS/UL (ref 149–390)
PMV BLD AUTO: 9.2 FL (ref 8.9–12.7)
POTASSIUM SERPL-SCNC: 3.4 MMOL/L (ref 3.5–5.3)
PROT SERPL-MCNC: 7.5 G/DL (ref 6.4–8.2)
RBC # BLD AUTO: 3.66 MILLION/UL (ref 3.81–5.12)
SODIUM SERPL-SCNC: 129 MMOL/L (ref 136–145)
WBC # BLD AUTO: 4.48 THOUSAND/UL (ref 4.31–10.16)

## 2017-10-03 PROCEDURE — 80053 COMPREHEN METABOLIC PANEL: CPT

## 2017-10-03 PROCEDURE — 36415 COLL VENOUS BLD VENIPUNCTURE: CPT

## 2017-10-03 PROCEDURE — 85025 COMPLETE CBC W/AUTO DIFF WBC: CPT

## 2017-10-03 RX ORDER — SODIUM CHLORIDE 9 MG/ML
20 INJECTION, SOLUTION INTRAVENOUS CONTINUOUS
Status: DISCONTINUED | OUTPATIENT
Start: 2017-10-04 | End: 2017-10-07 | Stop reason: HOSPADM

## 2017-10-04 ENCOUNTER — HOSPITAL ENCOUNTER (OUTPATIENT)
Dept: INFUSION CENTER | Facility: CLINIC | Age: 77
Discharge: HOME | End: 2017-10-04
Payer: COMMERCIAL

## 2017-10-04 ENCOUNTER — GENERIC CONVERSION - ENCOUNTER (OUTPATIENT)
Dept: OTHER | Facility: OTHER | Age: 77
End: 2017-10-04

## 2017-10-04 VITALS
DIASTOLIC BLOOD PRESSURE: 82 MMHG | WEIGHT: 90.39 LBS | TEMPERATURE: 97.6 F | RESPIRATION RATE: 16 BRPM | BODY MASS INDEX: 15.43 KG/M2 | SYSTOLIC BLOOD PRESSURE: 137 MMHG | HEIGHT: 64 IN | HEART RATE: 70 BPM

## 2017-10-04 PROCEDURE — 96375 TX/PRO/DX INJ NEW DRUG ADDON: CPT

## 2017-10-04 PROCEDURE — 96413 CHEMO IV INFUSION 1 HR: CPT

## 2017-10-04 RX ORDER — LORAZEPAM 2 MG/ML
0.5 INJECTION INTRAMUSCULAR ONCE
Status: COMPLETED | OUTPATIENT
Start: 2017-10-04 | End: 2017-10-04

## 2017-10-04 RX ADMIN — SODIUM CHLORIDE 20 ML/HR: 0.9 INJECTION, SOLUTION INTRAVENOUS at 11:16

## 2017-10-04 RX ADMIN — DEXAMETHASONE SODIUM PHOSPHATE: 10 INJECTION, SOLUTION INTRAMUSCULAR; INTRAVENOUS at 11:16

## 2017-10-04 RX ADMIN — LORAZEPAM 0.5 MG: 2 INJECTION INTRAMUSCULAR; INTRAVENOUS at 11:21

## 2017-10-04 RX ADMIN — PACLITAXEL 113 MG: 100 INJECTION, POWDER, LYOPHILIZED, FOR SUSPENSION INTRAVENOUS at 12:05

## 2017-10-04 NOTE — PROGRESS NOTES
Pt arrived with elevated blood pressure  Jude Garcia, [address] notified  Order obtained for 0 5mg Ativan IVP  Pt received treatment without issue  Aware of future appointments

## 2017-10-06 ENCOUNTER — GENERIC CONVERSION - ENCOUNTER (OUTPATIENT)
Dept: OTHER | Facility: OTHER | Age: 77
End: 2017-10-06

## 2017-10-10 ENCOUNTER — APPOINTMENT (OUTPATIENT)
Dept: LAB | Facility: CLINIC | Age: 77
End: 2017-10-10
Payer: COMMERCIAL

## 2017-10-10 DIAGNOSIS — C34.91 MALIGNANT NEOPLASM OF UNSPECIFIED PART OF RIGHT BRONCHUS OR LUNG (HCC): ICD-10-CM

## 2017-10-10 LAB
ALBUMIN SERPL BCP-MCNC: 3.3 G/DL (ref 3.5–5)
ALP SERPL-CCNC: 84 U/L (ref 46–116)
ALT SERPL W P-5'-P-CCNC: 17 U/L (ref 12–78)
ANION GAP SERPL CALCULATED.3IONS-SCNC: 8 MMOL/L (ref 4–13)
AST SERPL W P-5'-P-CCNC: 16 U/L (ref 5–45)
BASOPHILS # BLD AUTO: 0.05 THOUSANDS/ΜL (ref 0–0.1)
BASOPHILS NFR BLD AUTO: 1 % (ref 0–1)
BILIRUB SERPL-MCNC: 0.48 MG/DL (ref 0.2–1)
BUN SERPL-MCNC: 12 MG/DL (ref 5–25)
CALCIUM SERPL-MCNC: 9.2 MG/DL (ref 8.3–10.1)
CHLORIDE SERPL-SCNC: 97 MMOL/L (ref 100–108)
CO2 SERPL-SCNC: 29 MMOL/L (ref 21–32)
CREAT SERPL-MCNC: 0.41 MG/DL (ref 0.6–1.3)
EOSINOPHIL # BLD AUTO: 0.04 THOUSAND/ΜL (ref 0–0.61)
EOSINOPHIL NFR BLD AUTO: 1 % (ref 0–6)
ERYTHROCYTE [DISTWIDTH] IN BLOOD BY AUTOMATED COUNT: 20.5 % (ref 11.6–15.1)
GFR SERPL CREATININE-BSD FRML MDRD: 101 ML/MIN/1.73SQ M
GLUCOSE P FAST SERPL-MCNC: 108 MG/DL (ref 65–99)
HCT VFR BLD AUTO: 32 % (ref 34.8–46.1)
HGB BLD-MCNC: 10.9 G/DL (ref 11.5–15.4)
LYMPHOCYTES # BLD AUTO: 0.83 THOUSANDS/ΜL (ref 0.6–4.47)
LYMPHOCYTES NFR BLD AUTO: 21 % (ref 14–44)
MCH RBC QN AUTO: 31.1 PG (ref 26.8–34.3)
MCHC RBC AUTO-ENTMCNC: 34.1 G/DL (ref 31.4–37.4)
MCV RBC AUTO: 91 FL (ref 82–98)
MONOCYTES # BLD AUTO: 0.68 THOUSAND/ΜL (ref 0.17–1.22)
MONOCYTES NFR BLD AUTO: 17 % (ref 4–12)
NEUTROPHILS # BLD AUTO: 2.38 THOUSANDS/ΜL (ref 1.85–7.62)
NEUTS SEG NFR BLD AUTO: 60 % (ref 43–75)
NRBC BLD AUTO-RTO: 0 /100 WBCS
PLATELET # BLD AUTO: 448 THOUSANDS/UL (ref 149–390)
PMV BLD AUTO: 9.1 FL (ref 8.9–12.7)
POTASSIUM SERPL-SCNC: 3.2 MMOL/L (ref 3.5–5.3)
PROT SERPL-MCNC: 7.5 G/DL (ref 6.4–8.2)
RBC # BLD AUTO: 3.5 MILLION/UL (ref 3.81–5.12)
SODIUM SERPL-SCNC: 134 MMOL/L (ref 136–145)
WBC # BLD AUTO: 4 THOUSAND/UL (ref 4.31–10.16)

## 2017-10-10 PROCEDURE — 36415 COLL VENOUS BLD VENIPUNCTURE: CPT

## 2017-10-10 PROCEDURE — 85025 COMPLETE CBC W/AUTO DIFF WBC: CPT

## 2017-10-10 PROCEDURE — 80053 COMPREHEN METABOLIC PANEL: CPT

## 2017-10-10 RX ORDER — SODIUM CHLORIDE 9 MG/ML
20 INJECTION, SOLUTION INTRAVENOUS CONTINUOUS
Status: DISCONTINUED | OUTPATIENT
Start: 2017-10-11 | End: 2017-10-14 | Stop reason: HOSPADM

## 2017-10-11 ENCOUNTER — GENERIC CONVERSION - ENCOUNTER (OUTPATIENT)
Dept: OTHER | Facility: OTHER | Age: 77
End: 2017-10-11

## 2017-10-11 ENCOUNTER — HOSPITAL ENCOUNTER (OUTPATIENT)
Dept: INFUSION CENTER | Facility: CLINIC | Age: 77
Discharge: HOME | End: 2017-10-11
Payer: COMMERCIAL

## 2017-10-11 VITALS
BODY MASS INDEX: 15.91 KG/M2 | WEIGHT: 92.59 LBS | HEART RATE: 86 BPM | DIASTOLIC BLOOD PRESSURE: 103 MMHG | RESPIRATION RATE: 16 BRPM | TEMPERATURE: 97.6 F | SYSTOLIC BLOOD PRESSURE: 186 MMHG

## 2017-10-11 PROCEDURE — 96375 TX/PRO/DX INJ NEW DRUG ADDON: CPT

## 2017-10-11 PROCEDURE — 96413 CHEMO IV INFUSION 1 HR: CPT

## 2017-10-11 RX ORDER — ALPRAZOLAM 0.25 MG/1
0.25 TABLET ORAL 4 TIMES DAILY PRN
COMMUNITY

## 2017-10-11 RX ORDER — ALPRAZOLAM 0.25 MG/1
0.25 TABLET ORAL ONCE
Status: DISCONTINUED | OUTPATIENT
Start: 2017-10-11 | End: 2017-10-14 | Stop reason: HOSPADM

## 2017-10-11 RX ADMIN — DEXAMETHASONE SODIUM PHOSPHATE: 10 INJECTION, SOLUTION INTRAMUSCULAR; INTRAVENOUS at 10:15

## 2017-10-11 RX ADMIN — PACLITAXEL 113 MG: 100 INJECTION, POWDER, LYOPHILIZED, FOR SUSPENSION INTRAVENOUS at 10:59

## 2017-10-11 RX ADMIN — SODIUM CHLORIDE 20 ML/HR: 0.9 INJECTION, SOLUTION INTRAVENOUS at 10:15

## 2017-10-11 NOTE — PROGRESS NOTES
Pt arrived to unit without complaint  BP elevated at 185/103  Pt denies symptoms such as headache,dizziness, chest pain, arm pain, blurred vision  Pt has been monitoring BP at home, readings have varied, but some readings are high 180s/100s  Pt has not taken any Xanax as prescribed by Amina Lopez PA-C because she has not felt symptomatic with anxiety  Amina Lopez  Notified of BP  Dr Fredrick Funes consulted  OK given to proceed with chemotherapy today  Pt has been instructed to make appt to see PCP either today or tomorrow to eval and treat HTN  Pt will be given record of BP readings here at infusion center  Amina Lopez PA-C will contact PCP as well to discuss pt's situation  Pt and  verbalized understanding

## 2017-10-11 NOTE — PROGRESS NOTES
Pt tolerated Abraxane well without any adverse reaction, left unit in stable condition without question or concern  Pt will be going to PCP at [phone] today for eval of HTN

## 2017-10-12 ENCOUNTER — GENERIC CONVERSION - ENCOUNTER (OUTPATIENT)
Dept: OTHER | Facility: OTHER | Age: 77
End: 2017-10-12

## 2017-10-17 ENCOUNTER — TRANSCRIBE ORDERS (OUTPATIENT)
Dept: LAB | Facility: CLINIC | Age: 77
End: 2017-10-17

## 2017-10-17 ENCOUNTER — APPOINTMENT (OUTPATIENT)
Dept: LAB | Facility: CLINIC | Age: 77
End: 2017-10-17
Payer: COMMERCIAL

## 2017-10-17 DIAGNOSIS — C34.91 PRIMARY LUNG CANCER WITH METASTASIS FROM LUNG TO OTHER SITE, RIGHT (HCC): Primary | ICD-10-CM

## 2017-10-17 DIAGNOSIS — C34.91 MALIGNANT NEOPLASM OF UNSPECIFIED PART OF RIGHT BRONCHUS OR LUNG (HCC): ICD-10-CM

## 2017-10-17 LAB
ALBUMIN SERPL BCP-MCNC: 3.7 G/DL (ref 3.5–5)
ALP SERPL-CCNC: 97 U/L (ref 46–116)
ALT SERPL W P-5'-P-CCNC: 14 U/L (ref 12–78)
ANION GAP SERPL CALCULATED.3IONS-SCNC: 7 MMOL/L (ref 4–13)
AST SERPL W P-5'-P-CCNC: 12 U/L (ref 5–45)
BASOPHILS # BLD AUTO: 0.05 THOUSANDS/ΜL (ref 0–0.1)
BASOPHILS NFR BLD AUTO: 1 % (ref 0–1)
BILIRUB SERPL-MCNC: 0.79 MG/DL (ref 0.2–1)
BUN SERPL-MCNC: 10 MG/DL (ref 5–25)
CALCIUM SERPL-MCNC: 9.3 MG/DL (ref 8.3–10.1)
CHLORIDE SERPL-SCNC: 95 MMOL/L (ref 100–108)
CO2 SERPL-SCNC: 30 MMOL/L (ref 21–32)
CREAT SERPL-MCNC: 0.41 MG/DL (ref 0.6–1.3)
EOSINOPHIL # BLD AUTO: 0.04 THOUSAND/ΜL (ref 0–0.61)
EOSINOPHIL NFR BLD AUTO: 1 % (ref 0–6)
ERYTHROCYTE [DISTWIDTH] IN BLOOD BY AUTOMATED COUNT: 20.5 % (ref 11.6–15.1)
GFR SERPL CREATININE-BSD FRML MDRD: 101 ML/MIN/1.73SQ M
GLUCOSE SERPL-MCNC: 102 MG/DL (ref 65–140)
HCT VFR BLD AUTO: 33.8 % (ref 34.8–46.1)
HGB BLD-MCNC: 11.3 G/DL (ref 11.5–15.4)
LYMPHOCYTES # BLD AUTO: 0.99 THOUSANDS/ΜL (ref 0.6–4.47)
LYMPHOCYTES NFR BLD AUTO: 21 % (ref 14–44)
MCH RBC QN AUTO: 31 PG (ref 26.8–34.3)
MCHC RBC AUTO-ENTMCNC: 33.4 G/DL (ref 31.4–37.4)
MCV RBC AUTO: 93 FL (ref 82–98)
MONOCYTES # BLD AUTO: 0.73 THOUSAND/ΜL (ref 0.17–1.22)
MONOCYTES NFR BLD AUTO: 15 % (ref 4–12)
NEUTROPHILS # BLD AUTO: 2.92 THOUSANDS/ΜL (ref 1.85–7.62)
NEUTS SEG NFR BLD AUTO: 62 % (ref 43–75)
NRBC BLD AUTO-RTO: 0 /100 WBCS
PLATELET # BLD AUTO: 456 THOUSANDS/UL (ref 149–390)
PMV BLD AUTO: 8.9 FL (ref 8.9–12.7)
POTASSIUM SERPL-SCNC: 3.4 MMOL/L (ref 3.5–5.3)
PROT SERPL-MCNC: 7.8 G/DL (ref 6.4–8.2)
RBC # BLD AUTO: 3.65 MILLION/UL (ref 3.81–5.12)
SODIUM SERPL-SCNC: 132 MMOL/L (ref 136–145)
WBC # BLD AUTO: 4.75 THOUSAND/UL (ref 4.31–10.16)

## 2017-10-17 PROCEDURE — 85025 COMPLETE CBC W/AUTO DIFF WBC: CPT

## 2017-10-17 PROCEDURE — 36415 COLL VENOUS BLD VENIPUNCTURE: CPT

## 2017-10-17 PROCEDURE — 80053 COMPREHEN METABOLIC PANEL: CPT

## 2017-10-17 RX ORDER — SODIUM CHLORIDE 9 MG/ML
20 INJECTION, SOLUTION INTRAVENOUS CONTINUOUS
Status: DISCONTINUED | OUTPATIENT
Start: 2017-10-18 | End: 2017-10-21 | Stop reason: HOSPADM

## 2017-10-18 ENCOUNTER — HOSPITAL ENCOUNTER (OUTPATIENT)
Dept: INFUSION CENTER | Facility: CLINIC | Age: 77
Discharge: HOME | End: 2017-10-18
Payer: COMMERCIAL

## 2017-10-18 VITALS
HEIGHT: 64 IN | HEART RATE: 91 BPM | DIASTOLIC BLOOD PRESSURE: 78 MMHG | WEIGHT: 94.69 LBS | SYSTOLIC BLOOD PRESSURE: 150 MMHG | TEMPERATURE: 97.1 F | BODY MASS INDEX: 16.17 KG/M2 | RESPIRATION RATE: 18 BRPM

## 2017-10-18 PROCEDURE — 96413 CHEMO IV INFUSION 1 HR: CPT

## 2017-10-18 PROCEDURE — 96417 CHEMO IV INFUS EACH ADDL SEQ: CPT

## 2017-10-18 PROCEDURE — 96367 TX/PROPH/DG ADDL SEQ IV INF: CPT

## 2017-10-18 RX ORDER — METOPROLOL SUCCINATE 25 MG/1
12.5 TABLET, EXTENDED RELEASE ORAL DAILY
COMMUNITY
End: 2018-05-24 | Stop reason: SDUPTHER

## 2017-10-18 RX ORDER — TRAMADOL HYDROCHLORIDE 50 MG/1
50 TABLET ORAL EVERY 6 HOURS PRN
COMMUNITY

## 2017-10-18 RX ADMIN — DEXAMETHASONE SODIUM PHOSPHATE: 10 INJECTION, SOLUTION INTRAMUSCULAR; INTRAVENOUS at 10:36

## 2017-10-18 RX ADMIN — CARBOPLATIN 353 MG: 10 INJECTION, SOLUTION INTRAVENOUS at 12:35

## 2017-10-18 RX ADMIN — SODIUM CHLORIDE 20 ML/HR: 0.9 INJECTION, SOLUTION INTRAVENOUS at 10:35

## 2017-10-18 RX ADMIN — PACLITAXEL 112 MG: 100 INJECTION, POWDER, LYOPHILIZED, FOR SUSPENSION INTRAVENOUS at 11:42

## 2017-10-18 NOTE — PLAN OF CARE
Problem: Potential for Falls  Goal: Patient will remain free of falls  INTERVENTIONS:  - Assess patient frequently for physical needs  -  Identify cognitive and physical deficits and behaviors that affect risk of falls    -  [place] fall precautions as indicated by assessment   - Educate patient/family on patient safety including physical limitations  - Instruct patient to call for assistance with activity based on assessment  - Modify environment to reduce risk of injury  - Consider OT/PT consult to assist with strengthening/mobility   Outcome: Progressing

## 2017-10-18 NOTE — PROGRESS NOTES
Patient arrived to the unit with an elevated bp  After sitting for 20 minutes her bp came down  The patient and her  are in agreement that she should take the xanax before her chemotherapy appointments  She tolerated her treatment well without adverse reaction

## 2017-10-20 ENCOUNTER — LAB REQUISITION (OUTPATIENT)
Dept: LAB | Facility: HOSPITAL | Age: 77
End: 2017-10-20
Payer: COMMERCIAL

## 2017-10-20 ENCOUNTER — ALLSCRIPTS OFFICE VISIT (OUTPATIENT)
Dept: OTHER | Facility: OTHER | Age: 77
End: 2017-10-20

## 2017-10-20 DIAGNOSIS — R22.9 LOCALIZED SWELLING, MASS AND LUMP, UNSPECIFIED: ICD-10-CM

## 2017-10-20 PROCEDURE — 88341 IMHCHEM/IMCYTCHM EA ADD ANTB: CPT | Performed by: SURGERY

## 2017-10-20 PROCEDURE — 88307 TISSUE EXAM BY PATHOLOGIST: CPT | Performed by: SURGERY

## 2017-10-20 PROCEDURE — 88342 IMHCHEM/IMCYTCHM 1ST ANTB: CPT | Performed by: SURGERY

## 2017-10-21 NOTE — CONSULTS
Assessment  1  Subcutaneous nodule (782 2) (R22 9)    Plan  Subcutaneous nodule    · Follow-up visit in 2 weeks Evaluation and Treatment  Follow-up  Status: Complete  Done:  [id number]   Ordered; For: Subcutaneous nodule; Ordered By: Michelle Marroquin Performed:  Due: 30DWZ8971; Last Updated By: Wing White; 10/20/2017 1:20:20 PM    Discussion/Summary  Discussion Summary:   History of metastatic squamous cell cancer, now with left gluteal subcutaneous nodule  This is worrisome for malignancy based on PET scan  Biopsy done today  Plan a 2 week follow-up he to go over biopsy results with patient and for site check  Goals and Barriers: The patient has the current Goals: [place]  The patent has the current Barriers: None  Patient's Capacity to Self-Care: Patient is able to Self-Care  Patient agrees and allows to involve family/caregiver in development of care plan:   Medication SE Review and Pt Understands Tx: The treatment plan was reviewed with the patient/guardian  The patient/guardian understands and agrees with the treatment plan      Chief Complaint  Chief Complaint Free Text Note Form: Patient here for surgical consult subcutaneous nodule  History of Present Illness  HPI: Patient is a 16-year-old woman with metastatic lung cancer  She was found to have a left gluteal subcutaneous nodule for which we have been requested to evaluate and biopsy  She has a history of squamous cell carcinoma, stage IV, and is receiving treatment with Abraxane and carboplatin  She completed 3 cycles and had repeat PET/CT  This showed a PET avid left gluteal nodule  Review of Systems  ROS Reviewed:   ROS reviewed  Active Problems  1  Anxiety (300 00) (F41 9)   2  Bone metastases (198 5) (C79 51)   3  Elevated blood pressure reading (796 2) (R03 0)   4  History of leukocytosis (V12 3) (Z86 2)   5  Hyponatremia (276 1) (E87 1)   6  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)   7   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 9) (C34 91)   8  Pain, joint, shoulder (719 41) (M25 519)   9  Pleural effusion (511 9) (J90)   10  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 9) (C34 90)   11  Subcutaneous nodule (782 2) (R22 9)   12  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)    Past Medical History  1  History of Cellulitis of both feet (682 7) (L03 115,L03 116)   2  History of leukocytosis (V12 3) (Z86 2)   3  History of Lower leg edema (782 3) (R60 0)  Active Problems And Past Medical History Reviewed: The active problems and past medical history were reviewed and updated today  Surgical History  1  History of Appendectomy   2  History of Gynecologic Services Insertion Of Pessary Ring   3  History of Tonsillectomy  Surgical History Reviewed: The surgical history was reviewed and updated today  Family History  Mother    1  Family history of dementia (V17 2) (Z81 8)  Father    2  Family history of cerebrovascular accident (CVA) (V17 1) (Z82 3)  Family History Reviewed: The family history was reviewed and updated today  Social History   · Employed   · Former smoker (Y86 91) (W25 714)   ·   Social History Reviewed: The social history was reviewed and updated today  Current Meds   1  Abraxane 100 MG Intravenous Suspension Reconstituted; Therapy: 87Rnv0788 to Recorded   2  ALPRAZolam 0 25 MG Oral Tablet; take 1 tablet at bedtime as needed; Therapy: 16EIO5994 to (Last Rx:05Oct2017) Ordered   3  CARBOplatin 150 MG/15ML Intravenous Solution; Therapy: 00Olk4867 to Recorded   4  Fiber TABS; Therapy: (Recorded:03Obn1631) to Recorded   5  Metoprolol Succinate ER 25 MG Oral Tablet Extended Release 24 Hour; take 0 5 tablet   daily; Therapy: 63RZV1679 to (Evaluate:10Nov2017)  Requested for: 73VGI2776; Last   Rx:11Oct2017 Ordered   6  TraMADol HCl - 50 MG Oral Tablet; TAKE 1 TABLET EVERY 6 HOURS AS NEEDED FOR   BREAKTHROUGH PAIN;   Therapy: 55WFB3197 to (Evaluate:05Nov2017); Last Rx:06Oct2017 Ordered   7  Zolpidem Tartrate 5 MG Oral Tablet; Take 1 or 2 at bedtime for sleep; Therapy: 46BIB4612 to (Evaluate:11Aug2017); Last Rx:40Omo4634 Ordered  Medication List Reviewed: The medication list was reviewed and updated today  Allergies  1  No Known Drug Allergies    Vitals  Vital Signs    Recorded: 24LJI3887 12:05PM   Temperature 98 4 F   Heart Rate 88   Respiration 16   Systolic 706   Diastolic 60   Height 5 ft 3 in   Weight 97 lb    BMI Calculated 17 18   BSA Calculated 1 42   O2 Saturation 90     Physical Exam    Constitutional: General appearance: Abnormal  -- Thin, cachectic  HEENT: Head and face: Normal  -- Conjunctiva and lids: No swelling, erythema, or discharge  -- Neck is supple without adenopathy  Chest: The chest is normal to inspection  -- The lungs are clear to auscultation  -- There are bilaterally symmetrical breath sounds  -- There is a RRR, normal S1 and S2, without murmurs, rub or gallop  Abdomen: The abdomen is normal to inspection and percussion  It is soft, non-tender  There are normal bowel sounds  There are no abnormal masses  Extremities: Gait and station: Normal  -- Inspection/palpation of joints, bones, and muscles: Normal  -- There is no edema  -- Sensation is intact to light touch  Neuro: Grossly nonfocal  -- Orientation to person, place and time: Normal  -- Mood and affect: Normal     Skin:   Skin and subcutaneous tissue: Abnormal  -- 2 cm left gluteal subcutaneous nodule, firm, somewhat mobile  -- Palpation of skin and subcutaneous tissue: Abnormal       Results/Data  Diagnostic Studies Reviewed Surg Onc:   PET Scan Review * NM PET CT SKULL BASE TO MID THIGH Final  Documents Attached          PET/CT SCAN    INDICATION: C34 91: Malignant neoplasm of unspecified part of right bronchus or lung  History taken directly from the electronic ordering system   Lung cancer, restaging post chemotherapy for treatment management    MODIFIER: PS    COMPARISON: PET CT 6/26/2017    CELL TYPE: Squamous cell carcinoma, right bronchus biopsy 6/7/2017    TECHNIQUE: 8 8 mCi F-18-FDG administered IV  Multiplanar attenuation corrected and non attenuation corrected PET images are available for interpretation, and contiguous, low dose, axial CT sections were obtained from the skull base through the femurs  following the administration of oral contrast material (7 5 cc Omnipaque-240 in 300 cc water)  Intravenous contrast material was not utilized  This examination, like all CT scans performed in the [facility], was performed utilizing  techniques to minimize radiation dose exposure, including the use of iterative reconstruction and automated exposure control  Fasting serum glucose: 109 mg/dl    FINDINGS:    VISUALIZED BRAIN:  No acute abnormalities are seen  HEAD/NECK:  There is a new hypermetabolic lesion in the right tongue, oral cavity region, SUV 11 2  This measures approximately 1 cm  This is presumably neoplastic/metastatic  Increased hypermetabolic anterior left thyroid nodule, versus metastasis, SUV 19 8, prior SUV 8 6  CT images: Otherwise stable  CHEST:  Decreased size and hypermetabolism of large right lower lung mass, compatible with some response to therapy, however viable tumor remains  This currently measures 4 9 x 3 3 cm, SUV 16 6  Prior measurement 5 1 x 3 9 cm, SUV 23 2  Improved aeration of  the right lower lung  Areas of photopenia suggesting necrosis  Stable size of right upper cavitary lesion with mildly decreased hypermetabolism, measuring 2 2 x 1 7 cm, SUV 0 7  Prior measurement 2 1 x 1 7 cm, SUV 1 7  Significantly improved large confluent right hilar and mediastinal hypermetabolic adenopathy  There is a persistent hypermetabolic subcarinal node measuring 1 8 x 1 4 cm, SUV 24 3  Right hilar activity has an SUV of 3 4  Prior SUV 28 7      There is a new hypermetabolic lesion along the left upper medial pleural between the left 3rd and 4th costovertebral junctions, image 64, measuring 2 4 x 1 2 cm, SUV 18 5  This is most compatible with a metastasis  There may also be a new right mid posterior chest wall lesion, SUV 1 2  CT images: Otherwise stable  Coronary artery calcifications  ABDOMEN:  Resolved hypermetabolic liver metastasis  Improved left adrenal lesion, measuring 1 7 x 1 cm, SUV 1 7  Prior measurement 2 1 x 2 3 cm, SUV 18 8  No new FDG avid soft tissue lesions are seen  CT images: Left nephrolithiasis  PELVIS:  There is a new subcutaneous hypermetabolic nodule overlying the left gluteal muscles image 188 measuring 1 cm, SUV 12 5  This is most compatible with a metastasis  Additional subcutaneous metastases seen on the prior exam have improved or resolved  No additional FDG avid soft tissue lesions are seen  CT images: Otherwise stable  OSSEOUS STRUCTURES:  Decreased activity in the proximal left femur, SUV 2 1, prior SUV 13 9  Resolved activity in the right lateral 7th rib  Increased lytic lesion in the medial left scapula, SUV 1 6  Prior SUV 1 1  Healing left posterior lateral 7th rib fracture, SUV 1 5  CT images: Spine degenerative change  IMPRESSION:  1  Significantly improved hypermetabolic right lower lung mass and right hilar/mediastinal confluent adenopathy  Residual viable tumor however remains  There is also a new hypermetabolic pleural-based lesion in the left lung apex  2  Decreased hypermetabolism of right upper lung cavitary lesion  3  Improved liver and left adrenal metastases  4  New hypermetabolic lesion in the right tongue/oral cavity region, concerning for neoplasm  Correlation with clinical exam recommended  5  Enlarging hypermetabolic nodular focus along the anterior left thyroid, likely a metastasis  6  Variable change in scattered osseous metastases    7  Variable change in scattered subcutaneous lesions as well, with improvement in some lesions and appearance of new lesions  Workstation performed: [id number]    Signed by:  Elenita Wooten MD  9/26/17      by: Sung Simmonds Collected/Examined: 60LKL0883 11:40AM by: Sung Simmonds [id number] 03:40PM Communication: No patient communication needed at this time; Final Resulted: [id number] 03:16PM Last Updated: [id number] 03:40PM Accession: [id number]  Procedure  after consent was obtained, the left gluteal subcutaneous nodules palpated, prepped, anesthetized with local anesthesia  Following this an 11 blade was used make a nick in the skin  Two fires of a Seanodes core biopsy device were performed to biopsy the nodule under direct ultrasound guidance  Pressure and a bandaid were applied at the end of the procedure  Patient tolerated procedure well  Future Appointments    Date/Time Provider Specialty Site   11/07/2017 11:20 AM JONNIE Corcoran  Pulmonary Medicine [facility] [place]    10/26/2017 09:00 AM JONNIE Bucio , [facility] Hematology Oncology [address] [place] [address] MEDICAL ONCOLOGY   10/25/2017 03:00 PM JONNIE Mesa  [address]   12/13/2017 01:30 PM JONNIE Mesa   [address]     Signatures   Electronically signed by : Ezio Schulte MD; Oct 20 2017  1:24PM EST                       (Author)

## 2017-10-22 ENCOUNTER — ANESTHESIA EVENT (OUTPATIENT)
Dept: PERIOP | Facility: HOSPITAL | Age: 77
End: 2017-10-22
Payer: COMMERCIAL

## 2017-10-22 RX ORDER — SODIUM CHLORIDE 9 MG/ML
125 INJECTION, SOLUTION INTRAVENOUS CONTINUOUS
Status: CANCELLED | OUTPATIENT
Start: 2017-11-07

## 2017-10-23 ENCOUNTER — HOSPITAL ENCOUNTER (OUTPATIENT)
Dept: NON INVASIVE DIAGNOSTICS | Facility: HOSPITAL | Age: 77
Discharge: HOME | End: 2017-10-23
Attending: OTOLARYNGOLOGY
Payer: COMMERCIAL

## 2017-10-23 ENCOUNTER — APPOINTMENT (OUTPATIENT)
Dept: PREADMISSION TESTING | Facility: HOSPITAL | Age: 77
End: 2017-10-23
Payer: COMMERCIAL

## 2017-10-23 ENCOUNTER — APPOINTMENT (OUTPATIENT)
Dept: LAB | Facility: HOSPITAL | Age: 77
End: 2017-10-23
Attending: OTOLARYNGOLOGY
Payer: COMMERCIAL

## 2017-10-23 ENCOUNTER — TRANSCRIBE ORDERS (OUTPATIENT)
Dept: ADMINISTRATIVE | Facility: HOSPITAL | Age: 77
End: 2017-10-23

## 2017-10-23 ENCOUNTER — APPOINTMENT (OUTPATIENT)
Dept: LAB | Facility: HOSPITAL | Age: 77
End: 2017-10-23
Attending: INTERNAL MEDICINE
Payer: COMMERCIAL

## 2017-10-23 VITALS
DIASTOLIC BLOOD PRESSURE: 80 MMHG | SYSTOLIC BLOOD PRESSURE: 138 MMHG | HEIGHT: 63 IN | WEIGHT: 95 LBS | BODY MASS INDEX: 16.83 KG/M2 | TEMPERATURE: 97.3 F | HEART RATE: 82 BPM | RESPIRATION RATE: 18 BRPM

## 2017-10-23 DIAGNOSIS — C34.91 PRIMARY LUNG CANCER WITH METASTASIS FROM LUNG TO OTHER SITE, RIGHT (HCC): Primary | ICD-10-CM

## 2017-10-23 DIAGNOSIS — C34.91 PRIMARY LUNG CANCER WITH METASTASIS FROM LUNG TO OTHER SITE, RIGHT (HCC): ICD-10-CM

## 2017-10-23 DIAGNOSIS — D37.02 NEOPLASM OF UNCERTAIN BEHAVIOR OF BASE OF TONGUE: ICD-10-CM

## 2017-10-23 DIAGNOSIS — D37.02 NEOPLASM OF UNCERTAIN BEHAVIOR OF BASE OF TONGUE: Primary | ICD-10-CM

## 2017-10-23 LAB
ALBUMIN SERPL BCP-MCNC: 3.4 G/DL (ref 3.5–5)
ALP SERPL-CCNC: 84 U/L (ref 46–116)
ALT SERPL W P-5'-P-CCNC: 18 U/L (ref 12–78)
ANION GAP SERPL CALCULATED.3IONS-SCNC: 4 MMOL/L (ref 4–13)
APTT PPP: 30 SECONDS (ref 23–35)
AST SERPL W P-5'-P-CCNC: 17 U/L (ref 5–45)
ATRIAL RATE: 83 BPM
BASOPHILS # BLD AUTO: 0.03 THOUSANDS/ΜL (ref 0–0.1)
BASOPHILS NFR BLD AUTO: 1 % (ref 0–1)
BILIRUB SERPL-MCNC: 0.47 MG/DL (ref 0.2–1)
BUN SERPL-MCNC: 12 MG/DL (ref 5–25)
CALCIUM SERPL-MCNC: 8.9 MG/DL (ref 8.3–10.1)
CHLORIDE SERPL-SCNC: 95 MMOL/L (ref 100–108)
CO2 SERPL-SCNC: 32 MMOL/L (ref 21–32)
CREAT SERPL-MCNC: 0.45 MG/DL (ref 0.6–1.3)
EOSINOPHIL # BLD AUTO: 0.03 THOUSAND/ΜL (ref 0–0.61)
EOSINOPHIL NFR BLD AUTO: 1 % (ref 0–6)
ERYTHROCYTE [DISTWIDTH] IN BLOOD BY AUTOMATED COUNT: 20 % (ref 11.6–15.1)
GFR SERPL CREATININE-BSD FRML MDRD: 98 ML/MIN/1.73SQ M
GLUCOSE SERPL-MCNC: 110 MG/DL (ref 65–140)
HCT VFR BLD AUTO: 29.7 % (ref 34.8–46.1)
HGB BLD-MCNC: 10 G/DL (ref 11.5–15.4)
INR PPP: 0.98 (ref 0.86–1.16)
LYMPHOCYTES # BLD AUTO: 0.91 THOUSANDS/ΜL (ref 0.6–4.47)
LYMPHOCYTES NFR BLD AUTO: 21 % (ref 14–44)
MCH RBC QN AUTO: 31.8 PG (ref 26.8–34.3)
MCHC RBC AUTO-ENTMCNC: 33.7 G/DL (ref 31.4–37.4)
MCV RBC AUTO: 95 FL (ref 82–98)
MONOCYTES # BLD AUTO: 0.52 THOUSAND/ΜL (ref 0.17–1.22)
MONOCYTES NFR BLD AUTO: 12 % (ref 4–12)
NEUTROPHILS # BLD AUTO: 2.88 THOUSANDS/ΜL (ref 1.85–7.62)
NEUTS SEG NFR BLD AUTO: 65 % (ref 43–75)
NRBC BLD AUTO-RTO: 0 /100 WBCS
P AXIS: 65 DEGREES
PLATELET # BLD AUTO: 306 THOUSANDS/UL (ref 149–390)
PMV BLD AUTO: 8.7 FL (ref 8.9–12.7)
POTASSIUM SERPL-SCNC: 3.5 MMOL/L (ref 3.5–5.3)
PR INTERVAL: 144 MS
PROT SERPL-MCNC: 7.3 G/DL (ref 6.4–8.2)
PROTHROMBIN TIME: 13 SECONDS (ref 12.1–14.4)
QRS AXIS: -6 DEGREES
QRSD INTERVAL: 90 MS
QT INTERVAL: 392 MS
QTC INTERVAL: 460 MS
RBC # BLD AUTO: 3.14 MILLION/UL (ref 3.81–5.12)
SODIUM SERPL-SCNC: 131 MMOL/L (ref 136–145)
T WAVE AXIS: 26 DEGREES
VENTRICULAR RATE: 83 BPM
WBC # BLD AUTO: 4.37 THOUSAND/UL (ref 4.31–10.16)

## 2017-10-23 PROCEDURE — 93005 ELECTROCARDIOGRAM TRACING: CPT

## 2017-10-23 PROCEDURE — 85730 THROMBOPLASTIN TIME PARTIAL: CPT

## 2017-10-23 PROCEDURE — 36415 COLL VENOUS BLD VENIPUNCTURE: CPT

## 2017-10-23 PROCEDURE — 85610 PROTHROMBIN TIME: CPT

## 2017-10-23 PROCEDURE — 85025 COMPLETE CBC W/AUTO DIFF WBC: CPT

## 2017-10-23 PROCEDURE — 80053 COMPREHEN METABOLIC PANEL: CPT

## 2017-10-23 NOTE — PRE-PROCEDURE INSTRUCTIONS
Pre-Surgery Instructions:   Medication Instructions    ALPRAZolam (XANAX) 0 25 mg tablet Patient was instructed by Physician and understands   calcium polycarbophil (FIBERCON) 625 mg tablet Patient was instructed by Physician and understands   metoprolol succinate (TOPROL-XL) 25 mg 24 hr tablet Patient was instructed by Physician and understands   traMADol (ULTRAM) 50 mg tablet Patient was instructed by Physician and understands   zolpidem (AMBIEN) 5 mg tablet Patient was instructed by Physician and understands  Pt instructed by Dr Terri Iverson to take metoprolol and alprazolam if needed with a sip of water the morning of surgery  Pt and spouse given/reviewed [facility] preop instructions and chlorhexadine soap  Pt to hold asa/NSAIDS/vitamins/herbal supplements one week before surgery

## 2017-10-23 NOTE — ANESTHESIA PREPROCEDURE EVALUATION
Review of Systems/Medical History  Patient summary reviewed  Chart reviewed  No history of anesthetic complications     Cardiovascular  Negative cardio ROS Hypertension controlled,   Comment: B LE edema of unsure etiology,  Pulmonary  Smoker cigarette smoker , Shortness of breath, ,   Comment: Productive cough    On chemotherapy for Right lung CA    Bronched for Right hilar mass with Right pleural effusion last hospitallization  GI/Hepatic  Negative GI/hepatic ROS          Negative  ROS        Endo/Other  Negative endo/other ROS      GYN       Hematology  Negative hematology ROS      Musculoskeletal  Back pain (scapular pain) , chronic back pain,        Neurology  Negative neurology ROS      Psychology   Anxiety,   Chronic opioid dependence         Physical Exam    Airway    Mallampati score: II  TM Distance: <3 FB  Neck ROM: full     Dental   lower dentures and upper dentures,     Cardiovascular  Comment: Negative ROS, Rhythm: regular, Rate: normal, Cardiovascular exam normal    Pulmonary  Comment: Decreased breath sounds lower right about 30% up , Decreased breath sounds,     Other Findings        Anesthesia Plan  ASA Score- 3       Anesthesia Type- general with ASA Monitors  Additional Monitors:   Airway Plan:           Induction- intravenous  Informed Consent- Anesthetic plan and risks discussed with patient and spouse

## 2017-10-24 RX ORDER — SODIUM CHLORIDE 9 MG/ML
20 INJECTION, SOLUTION INTRAVENOUS CONTINUOUS
Status: DISCONTINUED | OUTPATIENT
Start: 2017-10-25 | End: 2017-10-28 | Stop reason: HOSPADM

## 2017-10-25 ENCOUNTER — HOSPITAL ENCOUNTER (OUTPATIENT)
Dept: INFUSION CENTER | Facility: CLINIC | Age: 77
Discharge: HOME | End: 2017-10-25
Payer: COMMERCIAL

## 2017-10-25 VITALS
HEART RATE: 88 BPM | WEIGHT: 95.46 LBS | DIASTOLIC BLOOD PRESSURE: 81 MMHG | BODY MASS INDEX: 16.3 KG/M2 | SYSTOLIC BLOOD PRESSURE: 150 MMHG | TEMPERATURE: 96.4 F | RESPIRATION RATE: 16 BRPM | HEIGHT: 64 IN

## 2017-10-25 PROCEDURE — 96375 TX/PRO/DX INJ NEW DRUG ADDON: CPT

## 2017-10-25 PROCEDURE — 96413 CHEMO IV INFUSION 1 HR: CPT

## 2017-10-25 RX ADMIN — SODIUM CHLORIDE 20 ML/HR: 0.9 INJECTION, SOLUTION INTRAVENOUS at 10:38

## 2017-10-25 RX ADMIN — PACLITAXEL 112 MG: 100 INJECTION, POWDER, LYOPHILIZED, FOR SUSPENSION INTRAVENOUS at 11:10

## 2017-10-25 RX ADMIN — DEXAMETHASONE SODIUM PHOSPHATE: 10 INJECTION, SOLUTION INTRAMUSCULAR; INTRAVENOUS at 10:38

## 2017-10-25 NOTE — PROGRESS NOTES
Assessment  1  Elevated blood pressure reading (796 2) (R03 0)   2  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 9) (C34 91)   3  Bone metastases (198 5) (C79 51)    Discussion/Summary    #1: Elevated blood pressures: Patient does again have elevated blood pressure  At this point, I would consider medications for her such as lisinopril 5 mg, or metoprolol 25 mg of succinate, one half tablet daily  We will coordinate with Dr Matteo Simmsr, so that we are not having any side effects or problems with the chemotherapy agents, as well as not running into trouble with the electrolytes  Of note, her creatinine is currently 0 41 which is excellent, however sodium is slightly low at 132  Another option to consider is low-dose diuretics secondary to her hyponatremia, but with her low weight and other potential side effects from diuretics, I would like to hold off on those  Non-small cell cancer of the right lung: Patient currently in infusion for chemotherapy for this cancer  She is followed by Dr Matteo Martin  Doing relatively well at the moment  I did review the previous notes, and it shows that she does have some regression of disease  Bony metastasis: Secondary to the diagnosis for #2  Again, patient is in infusion center treatment for chemotherapy, and is doing quite well at the moment  Chief Complaint  Follow up to check BP  It has been high lately when at chemotherapy  She is in the 6th week of tx  History of Present Illness  Patient was at infusion center, and the staff noted increased BP's  on 23 Au/84, 156/80  On 30  Aug was: 184/90, 176/70  intake of coffee  She is using regular coffee currently  Review of Systems    Constitutional: No fever, no chills, feels well, no tiredness, no recent weight gain or weight loss  Eyes: No complaints of eye pain, no red eyes, no eyesight problems, no discharge, no dry eyes, no itching of eyes  Cardiovascular: as noted in HPI  Active Problems  1   Bone metastases (198 5) (C79 51)   2  Elevated blood pressure reading (796 2) (R03 0)   3  Hyponatremia (276 1) (E87 1)   4  Leukocytosis (288 60) (D72 829)   5  Lower leg edema (782 3) (R60 0)   6  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)   7  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 9) (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)   8  Pleural effusion (511 9) (J90)   9  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 9) (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)    Past Medical History  1  History of Cellulitis of both feet (682 7) (L03 115,L03 116)    The active problems and past medical history were reviewed and updated today  Surgical History  1  History of Appendectomy   2  History of Gynecologic Services Insertion Of Pessary Ring   3  History of Tonsillectomy    The surgical history was reviewed and updated today  Family History  Mother    1  Family history of dementia (V17 2) (Z81 8)  Father    2  Family history of cerebrovascular accident (CVA) (V17 1) (Z82 3)    The family history was reviewed and updated today  Social History   · Employed   · Former smoker (F75 06) (X76 057)   ·   The social history was reviewed and updated today  The social history was reviewed and is unchanged  Current Meds   1  Abraxane 100 MG Intravenous Suspension Reconstituted; Therapy: 42Edl1881 to Recorded   2  CARBOplatin 150 MG/15ML Intravenous Solution; Therapy: 50Wlt4693 to Recorded   3  Zolpidem Tartrate 5 MG Oral Tablet; Take 1 or 2 at bedtime for sleep; Therapy: 82FCJ4468 to (Evaluate:11Aug2017); Last Rx:81Dqz3092 Ordered    The medication list was reviewed and updated today  Allergies  1  No Known Drug Allergies    Vitals  Vital Signs    Recorded: 30Aug2017 04:35PM Recorded: 30Aug2017 03:38PM   Heart Rate  76   Systolic 522 087   Diastolic 90 90   Height  5 ft 3 in   Weight  94 lb    BMI Calculated  16 65   BSA Calculated  1 4     Physical Exam    Constitutional   General appearance: No acute distress, well appearing and well nourished      Pulmonary   Respiratory effort: No increased work of breathing or signs of respiratory distress  Auscultation of lungs: Clear to auscultation  Cardiovascular   Auscultation of heart: Normal rate and rhythm, normal S1 and S2, without murmurs  Future Appointments    Date/Time Provider Specialty Site   09/06/2017 01:40 PM JONNIE Gracia  Pulmonary Medicine [facility] [place]   09/05/2017 01:30 PM [facility]   10/11/2017 12:00 PM Patrick Denver, M D  [address] [place] [address]     Signatures   Electronically signed by :  JONNIE Wang ; Aug 30 2017  4:41PM EST                       (Author)

## 2017-10-26 ENCOUNTER — ALLSCRIPTS OFFICE VISIT (OUTPATIENT)
Dept: OTHER | Facility: OTHER | Age: 77
End: 2017-10-26

## 2017-10-27 NOTE — PROGRESS NOTES
Assessment  1  Bone metastases (198 5) (C79 51)   2  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 9) (C34 90)    Plan  Squamous cell carcinoma lung    · Drink plenty of fluids ; Status:Complete;   Done: 94ADJ2069   Ordered; For:Squamous cell carcinoma lung; Ordered By:Dominic Cunha;   · Follow-up visit in 1 month Evaluation and Treatment  Follow-up  Status: Hold For -  Scheduling  Requested for: 74HAD1513   Ordered; For: Squamous cell carcinoma lung; Ordered By: Cyndi Hartman Performed:  Due: 44YDU0961   · * NM PET CT SKULL BASE TO MID THIGH; Status:Need Information - Financial  Authorization; Requested for:[id number];    Perform:[facility]; VIU:[id number]; Ordered; For:Squamous cell carcinoma lung; Ordered By:Dominic Cunha;    Discussion/Summary  Discussion Summary:   In summary this is a 53-year-old female history of 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, as outlined had a recent removal of a lesion from the left gluteal region  Additionally, she has a tongue lesion noted on PET-CT  Further, she has a thyroid nodule which is hypermetabolic as well wonders about the possibility of a different malignancy explaining these abnormalities in a patient with responding squamous cell carcinoma of the lung  Differential diagnosis would include advanced thyroid cancer, lymphoma, less likely  Lymphoma does show some response to platinum and Taxane is thus I would not expect this to be getting worse in a patient on her current treatment program we reviewed the potential role of immunotherapy in the maintenance setting  This is not been established although there is indirect data in stage III patients suggesting that there is a disease-free and survival benefit when applied earlier than later  patient and her  voiced understanding above discussion  We await the results of recent gluteal biopsy and upcoming tongue biopsy  Chemotherapy continues in the interim        Chief Complaint  Chief Complaint Free Text Note Form: Follow-up regarding lung cancer  History of Present Illness  HPI: June 2017âpatient presented to the emergency room with lower extremity edema and redness  Additionally, she was found to have a right pleural effusion  CT chest, abdomen, pelvis showed right hilar mass, 3 2 cm mediastinal lymph nodes up to 15 mm, subcarinal 17 mm  Indeterminate left adrenal nodule 13 mm, moderate to large right pleural effusion  Moderate to large abdominal ascites  No bone lesions noted  7, 2017â bronchoscopy showed a right bronchus lesion  Biopsy was consistent with a non-small cell lung carcinoma, favor squamous cell carcinoma  26, 2017âhypermetabolic right lung mass, 5 1 cm, SUV 23 2 with central necrosis  Right hilar adenopathy extending the subcarina, SUV 28 7  Right upper lobe cavitary lesion, 2 1 cm, SUV 1 7  Right 10th rib and T1 spinous processes with hypermetabolic lesions  Left adrenal mass, 2 3 cm, SUV 18 827th, 2017 started Abraxane 80 milligrams/meter squared day 1, 8, 15, carbo AUC -5, day 1 only, Q 21 days  Current Therapy: July 27th, 2017 started Abraxane 80 milligrams/meter squared day 1, 8, 15, carbo AUC -5, day 1 only, Q 21 days  Interval History: Pain in the tongue  mass detected there  To be removed in the near future  scapular pain, better with Tramadol 0-1 per day         Review of Systems  Complete-Female:   Constitutional: recent 15 lbs in past few months  lb weight loss, but-- No fever, no chills, feels well, no tiredness, no recent weight gain or weight loss  Eyes: No complaints of eye pain, no red eyes, no eyesight problems, no discharge, no dry eyes, no itching of eyes  ENT: no complaints of earache, no loss of hearing, no nose bleeds, no nasal discharge, no sore throat, no hoarseness  Cardiovascular: No complaints of slow heart rate, no fast heart rate, no chest pain, no palpitations, no leg claudication, no lower extremity edema     Respiratory: No complaints of shortness of breath, no wheezing, no cough, no SOB on exertion, no orthopnea, no PND  Gastrointestinal: No complaints of abdominal pain, no constipation, no nausea or vomiting, no diarrhea, no bloody stools  Genitourinary: No complaints of dysuria, no incontinence, no pelvic pain, no dysmenorrhea, no vaginal discharge or bleeding  Musculoskeletal: No complaints of arthralgias, no myalgias, no joint swelling or stiffness, no limb pain or swelling  Integumentary: No complaints of skin rash or lesions, no itching, no skin wounds, no breast pain or lump  Neurological: No complaints of headache, no confusion, no convulsions, no numbness, no dizziness or fainting, no tingling, no limb weakness, no difficulty walking  Psychiatric: Not suicidal, no sleep disturbance, no anxiety or depression, no change in personality, no emotional problems  Endocrine: No complaints of proptosis, no hot flashes, no muscle weakness, no deepening of the voice, no feelings of weakness  Hematologic/Lymphatic: No complaints of swollen glands, no swollen glands in the neck, does not bleed easily, does not bruise easily  Active Problems  1  Anxiety (300 00) (F41 9)   2  Bone metastases (198 5) (C79 51)   3  History of leukocytosis (V12 3) (Z86 2)   4  Hypertension (401 9) (I10)   5  Hyponatremia (276 1) (E87 1)   6  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)   7  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 9) (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)   8  Pain, joint, shoulder (719 41) (M25 519)   9  Pleural effusion (511 9) (J90)   10  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 9) (C34 90)   11  Subcutaneous nodule (782 2) (R22 9)   12  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)    Past Medical History  1  History of Cellulitis of both feet (682 7) (L03 115,L03 116)   2  History of leukocytosis (V12 3) (Z86 2)   3  History of Lower leg edema (782 3) (R60 0)    Surgical History  1  History of Appendectomy   2  History of Gynecologic Services Insertion Of Pessary Ring   3  History of Tonsillectomy    Family History  Mother    1  Family history of dementia (V17 2) (Z81 8)  Father    2  Family history of cerebrovascular accident (CVA) (V17 1) (Z82 3)    Social History   · Employed   · Former smoker (D59 96) (Z07 340)   ·     Current Meds   1  Abraxane 100 MG Intravenous Suspension Reconstituted; Therapy: 03Kzf8215 to Recorded   2  ALPRAZolam 0 25 MG Oral Tablet; take 1 tablet at bedtime as needed; Therapy: 58XGY3032 to (Last Rx:05Oct2017) Ordered   3  CARBOplatin 150 MG/15ML Intravenous Solution; Therapy: 77Dvh0090 to Recorded   4  Fiber TABS; Therapy: (Recorded:05Sep2017) to Recorded   5  Metoprolol Succinate ER 25 MG Oral Tablet Extended Release 24 Hour; take 0 5 tablet   daily; Therapy: 83LMZ3384 to (Evaluate:10Nov2017)  Requested for: 42GVI4760; Last   Rx:11Oct2017 Ordered   6  TraMADol HCl - 50 MG Oral Tablet; TAKE 1 TABLET EVERY 6 HOURS AS NEEDED FOR   BREAKTHROUGH PAIN;   Therapy: 32VQJ1775 to (Evaluate:05Nov2017); Last Rx:06Oct2017 Ordered   7  Zolpidem Tartrate 5 MG Oral Tablet; Take 1 or 2 at bedtime for sleep; [facility] 57LOU4010 [facility] Last Rx:25Oct2017 Ordered    Allergies  1  No Known Drug Allergies    Vitals  Vital Signs    Recorded: 31IWM7183 10:00AM   Temperature 97 6 F   Heart Rate 89   Respiration 18   Systolic 200   Diastolic 74   Height 5 ft 3 in   Weight 94 lb 4 oz   BMI Calculated 16 7   BSA Calculated 1 4   O2 Saturation 97   Pain Scale 8     Physical Exam    Constitutional   General appearance: No acute distress, well appearing and well nourished  Eyes   Conjunctiva and lids: No swelling, erythema or discharge  Ears, Nose, Mouth, and Throat   External inspection of ears and nose: Normal     Oropharynx: Normal with no erythema, edema, exudate or lesions  Pulmonary   Auscultation of lungs: Abnormal  -- Decreased breath sounds throughout with dullness to percussion at the right lung base     Cardiovascular   Auscultation of heart: Normal rate and rhythm, normal S1 and S2, without murmurs  Examination of extremities for edema and/or varicosities: Normal     Abdomen   Abdomen: Non-tender, no masses  Liver and spleen: No hepatomegaly or splenomegaly  Lymphatic   Palpation of lymph nodes in neck: No lymphadenopathy  Musculoskeletal   Gait and station: Normal     Skin   Skin and subcutaneous tissue: Normal without rashes or lesions  Neurologic   Cranial nerves: Cranial nerves 2-12 intact  Psychiatric   Orientation to person, place, and time: Normal          Results/Data  (1) CBC/PLT/DIFF 23Oct2017 11:19AM Robe Day     Test Name Result Flag Reference   WBC COUNT 4 37 Thousand/uL  4 31-10 16   RBC COUNT 3 14 Million/uL L 3 81-5 12   HEMOGLOBIN 10 0 g/dL L 11 5-15 4   HEMATOCRIT 29 7 % L 34 8-46  1   MCV 95 fL  82-98   MCH 31 8 pg  26 8-34 3   MCHC 33 7 g/dL  31 4-37 4   RDW 20 0 % H 11 6-15 1   MPV 8 7 fL L 8 9-12 7   PLATELET COUNT 310 Thousands/uL  149-390   nRBC AUTOMATED 0 /100 WBCs     NEUTROPHILS RELATIVE PERCENT 65 %  43-75   LYMPHOCYTES RELATIVE PERCENT 21 %  14-44   MONOCYTES RELATIVE PERCENT 12 %  4-12   EOSINOPHILS RELATIVE PERCENT 1 %  0-6   BASOPHILS RELATIVE PERCENT 1 %  0-1   NEUTROPHILS ABSOLUTE COUNT 2 88 Thousands/? ??L  1 85-7 62   LYMPHOCYTES ABSOLUTE COUNT 0 91 Thousands/? ??L  0 60-4 47   MONOCYTES ABSOLUTE COUNT 0 52 Thousand/? ??L  0 17-1 22   EOSINOPHILS ABSOLUTE COUNT 0 03 Thousand/? ??L  0 00-0 61   BASOPHILS ABSOLUTE COUNT 0 03 Thousands/? ??L  0 00-0 10   This is a patient instruction: This test is non-fasting  Please drink two glasses of water morning of bloodwork  (1) COMPREHENSIVE METABOLIC PANEL 88JSD0158 03:19RF Robe Day     Test Name Result Flag Reference   GLUCOSE,RANDM 110 mg/dL     If the patient is fasting, the ADA then defines impaired fasting glucose as > 100 mg/dL and diabetes as > or equal to 123 mg/dL    Specimen collection should occur prior to Sulfasalazine administration due to the potential for falsely depressed results  Specimen collection should occur prior to Sulfapyridine administration due to the potential for falsely elevated results  SODIUM 131 mmol/L L 136-145   POTASSIUM 3 5 mmol/L  3 5-5 3   CHLORIDE 95 mmol/L L 100-108   CARBON DIOXIDE 32 mmol/L  21-32   ANION GAP (CALC) 4 mmol/L  4-13   BLOOD UREA NITROGEN 12 mg/dL  5-25   CREATININE 0 45 mg/dL L 0 60-1 30   Standardized to IDMS reference method   CALCIUM 8 9 mg/dL  8 3-10 1   BILI, TOTAL 0 47 mg/dL  0 20-1 00   ALK PHOSPHATAS 84 U/L     ALT (SGPT) 18 U/L  12-78   Specimen collection should occur prior to Sulfasalazine administration due to the potential for falsely depressed results  AST(SGOT) 17 U/L  5-45   Specimen collection should occur prior to Sulfasalazine administration due to the potential for falsely depressed results  ALBUMIN 3 4 g/dL L 3 5-5 0   TOTAL PROTEIN 7 3 g/dL  6 4-8 2   eGFR 98 ml/min/1 73sq m     National Kidney Disease Education Program recommendations are as follows:  GFR calculation is accurate only with a steady state creatinine  Chronic Kidney disease less than 60 ml/min/1 73 sq  meters  Kidney failure less than 15 ml/min/1 73 sq  meters  Future Appointments    Date/Time Provider Specialty Site   11/07/2017 11:20 AM JONNIE Jeffers  Pulmonary Medicine [facility] [place]   11/30/2017 11:30 AM JONNIE Gordon  [address] [place] [address]   12/13/2017 01:30 PM JONNIE Gordon  [address] [place] [address]   11/03/2017 01:45 PM Claribel Arguelles MD Surgical On[facility]     Signatures   Electronically signed by : JONNIE Camara  Oct 26 2017 10:40AM EST                       (Author)

## 2017-10-30 ENCOUNTER — APPOINTMENT (OUTPATIENT)
Dept: LAB | Facility: CLINIC | Age: 77
End: 2017-10-30
Payer: COMMERCIAL

## 2017-10-30 DIAGNOSIS — C34.91 MALIGNANT NEOPLASM OF UNSPECIFIED PART OF RIGHT BRONCHUS OR LUNG (HCC): ICD-10-CM

## 2017-10-30 LAB
ALBUMIN SERPL BCP-MCNC: 3.3 G/DL (ref 3.5–5)
ALP SERPL-CCNC: 88 U/L (ref 46–116)
ALT SERPL W P-5'-P-CCNC: 17 U/L (ref 12–78)
ANION GAP SERPL CALCULATED.3IONS-SCNC: 7 MMOL/L (ref 4–13)
AST SERPL W P-5'-P-CCNC: 11 U/L (ref 5–45)
BASOPHILS # BLD AUTO: 0.02 THOUSANDS/ΜL (ref 0–0.1)
BASOPHILS NFR BLD AUTO: 0 % (ref 0–1)
BILIRUB SERPL-MCNC: 0.58 MG/DL (ref 0.2–1)
BUN SERPL-MCNC: 15 MG/DL (ref 5–25)
CALCIUM SERPL-MCNC: 9.3 MG/DL (ref 8.3–10.1)
CHLORIDE SERPL-SCNC: 95 MMOL/L (ref 100–108)
CO2 SERPL-SCNC: 30 MMOL/L (ref 21–32)
CREAT SERPL-MCNC: 0.5 MG/DL (ref 0.6–1.3)
EOSINOPHIL # BLD AUTO: 0.05 THOUSAND/ΜL (ref 0–0.61)
EOSINOPHIL NFR BLD AUTO: 1 % (ref 0–6)
ERYTHROCYTE [DISTWIDTH] IN BLOOD BY AUTOMATED COUNT: 19 % (ref 11.6–15.1)
GFR SERPL CREATININE-BSD FRML MDRD: 95 ML/MIN/1.73SQ M
GLUCOSE SERPL-MCNC: 126 MG/DL (ref 65–140)
HCT VFR BLD AUTO: 29.2 % (ref 34.8–46.1)
HGB BLD-MCNC: 9.8 G/DL (ref 11.5–15.4)
LYMPHOCYTES # BLD AUTO: 0.98 THOUSANDS/ΜL (ref 0.6–4.47)
LYMPHOCYTES NFR BLD AUTO: 20 % (ref 14–44)
MCH RBC QN AUTO: 32.2 PG (ref 26.8–34.3)
MCHC RBC AUTO-ENTMCNC: 33.6 G/DL (ref 31.4–37.4)
MCV RBC AUTO: 96 FL (ref 82–98)
MONOCYTES # BLD AUTO: 0.7 THOUSAND/ΜL (ref 0.17–1.22)
MONOCYTES NFR BLD AUTO: 14 % (ref 4–12)
NEUTROPHILS # BLD AUTO: 3.25 THOUSANDS/ΜL (ref 1.85–7.62)
NEUTS SEG NFR BLD AUTO: 65 % (ref 43–75)
NRBC BLD AUTO-RTO: 0 /100 WBCS
PLATELET # BLD AUTO: 430 THOUSANDS/UL (ref 149–390)
PMV BLD AUTO: 9.1 FL (ref 8.9–12.7)
POTASSIUM SERPL-SCNC: 3.4 MMOL/L (ref 3.5–5.3)
PROT SERPL-MCNC: 7.7 G/DL (ref 6.4–8.2)
RBC # BLD AUTO: 3.04 MILLION/UL (ref 3.81–5.12)
SODIUM SERPL-SCNC: 132 MMOL/L (ref 136–145)
WBC # BLD AUTO: 5.01 THOUSAND/UL (ref 4.31–10.16)

## 2017-10-30 PROCEDURE — 85025 COMPLETE CBC W/AUTO DIFF WBC: CPT

## 2017-10-30 PROCEDURE — 36415 COLL VENOUS BLD VENIPUNCTURE: CPT

## 2017-10-30 PROCEDURE — 80053 COMPREHEN METABOLIC PANEL: CPT

## 2017-10-31 RX ORDER — SODIUM CHLORIDE 9 MG/ML
20 INJECTION, SOLUTION INTRAVENOUS CONTINUOUS
Status: DISCONTINUED | OUTPATIENT
Start: 2017-11-01 | End: 2017-11-04 | Stop reason: HOSPADM

## 2017-11-01 ENCOUNTER — HOSPITAL ENCOUNTER (OUTPATIENT)
Dept: INFUSION CENTER | Facility: CLINIC | Age: 77
Discharge: HOME | End: 2017-11-01
Payer: COMMERCIAL

## 2017-11-01 PROCEDURE — 96413 CHEMO IV INFUSION 1 HR: CPT

## 2017-11-01 PROCEDURE — 96375 TX/PRO/DX INJ NEW DRUG ADDON: CPT

## 2017-11-01 RX ADMIN — PACLITAXEL 112 MG: 100 INJECTION, POWDER, LYOPHILIZED, FOR SUSPENSION INTRAVENOUS at 11:12

## 2017-11-01 RX ADMIN — DEXAMETHASONE SODIUM PHOSPHATE: 10 INJECTION, SOLUTION INTRAMUSCULAR; INTRAVENOUS at 09:50

## 2017-11-01 RX ADMIN — SODIUM CHLORIDE 20 ML/HR: 0.9 INJECTION, SOLUTION INTRAVENOUS at 09:50

## 2017-11-01 NOTE — PROGRESS NOTES
Patient tolerated chemotherapy infusion well with no complications or complaints  Pt is aware of next appointment

## 2017-11-03 ENCOUNTER — GENERIC CONVERSION - ENCOUNTER (OUTPATIENT)
Dept: OTHER | Facility: OTHER | Age: 77
End: 2017-11-03

## 2017-11-06 ENCOUNTER — APPOINTMENT (OUTPATIENT)
Dept: LAB | Facility: CLINIC | Age: 77
End: 2017-11-06
Payer: COMMERCIAL

## 2017-11-06 DIAGNOSIS — C34.91 PRIMARY LUNG CANCER WITH METASTASIS FROM LUNG TO OTHER SITE, RIGHT (HCC): ICD-10-CM

## 2017-11-06 LAB
ALBUMIN SERPL BCP-MCNC: 3.2 G/DL (ref 3.5–5)
ALP SERPL-CCNC: 80 U/L (ref 46–116)
ALT SERPL W P-5'-P-CCNC: 12 U/L (ref 12–78)
ANION GAP SERPL CALCULATED.3IONS-SCNC: 7 MMOL/L (ref 4–13)
AST SERPL W P-5'-P-CCNC: 10 U/L (ref 5–45)
BASOPHILS # BLD AUTO: 0.01 THOUSANDS/ΜL (ref 0–0.1)
BASOPHILS NFR BLD AUTO: 0 % (ref 0–1)
BILIRUB SERPL-MCNC: 0.53 MG/DL (ref 0.2–1)
BUN SERPL-MCNC: 11 MG/DL (ref 5–25)
CALCIUM SERPL-MCNC: 9.3 MG/DL (ref 8.3–10.1)
CHLORIDE SERPL-SCNC: 95 MMOL/L (ref 100–108)
CO2 SERPL-SCNC: 31 MMOL/L (ref 21–32)
CREAT SERPL-MCNC: 0.46 MG/DL (ref 0.6–1.3)
EOSINOPHIL # BLD AUTO: 0.04 THOUSAND/ΜL (ref 0–0.61)
EOSINOPHIL NFR BLD AUTO: 1 % (ref 0–6)
ERYTHROCYTE [DISTWIDTH] IN BLOOD BY AUTOMATED COUNT: 17.4 % (ref 11.6–15.1)
GFR SERPL CREATININE-BSD FRML MDRD: 98 ML/MIN/1.73SQ M
GLUCOSE SERPL-MCNC: 136 MG/DL (ref 65–140)
HCT VFR BLD AUTO: 29.8 % (ref 34.8–46.1)
HGB BLD-MCNC: 9.9 G/DL (ref 11.5–15.4)
LYMPHOCYTES # BLD AUTO: 0.65 THOUSANDS/ΜL (ref 0.6–4.47)
LYMPHOCYTES NFR BLD AUTO: 13 % (ref 14–44)
MCH RBC QN AUTO: 32.6 PG (ref 26.8–34.3)
MCHC RBC AUTO-ENTMCNC: 33.2 G/DL (ref 31.4–37.4)
MCV RBC AUTO: 98 FL (ref 82–98)
MONOCYTES # BLD AUTO: 0.74 THOUSAND/ΜL (ref 0.17–1.22)
MONOCYTES NFR BLD AUTO: 15 % (ref 4–12)
NEUTROPHILS # BLD AUTO: 3.48 THOUSANDS/ΜL (ref 1.85–7.62)
NEUTS SEG NFR BLD AUTO: 71 % (ref 43–75)
NRBC BLD AUTO-RTO: 0 /100 WBCS
PLATELET # BLD AUTO: 499 THOUSANDS/UL (ref 149–390)
PMV BLD AUTO: 8.9 FL (ref 8.9–12.7)
POTASSIUM SERPL-SCNC: 3 MMOL/L (ref 3.5–5.3)
PROT SERPL-MCNC: 7.3 G/DL (ref 6.4–8.2)
RBC # BLD AUTO: 3.04 MILLION/UL (ref 3.81–5.12)
SODIUM SERPL-SCNC: 133 MMOL/L (ref 136–145)
WBC # BLD AUTO: 4.95 THOUSAND/UL (ref 4.31–10.16)

## 2017-11-06 PROCEDURE — 80053 COMPREHEN METABOLIC PANEL: CPT

## 2017-11-06 PROCEDURE — 36415 COLL VENOUS BLD VENIPUNCTURE: CPT

## 2017-11-06 PROCEDURE — 85025 COMPLETE CBC W/AUTO DIFF WBC: CPT

## 2017-11-07 ENCOUNTER — HOSPITAL ENCOUNTER (OUTPATIENT)
Facility: HOSPITAL | Age: 77
Setting detail: OUTPATIENT SURGERY
Discharge: HOME | End: 2017-11-07
Attending: OTOLARYNGOLOGY | Admitting: OTOLARYNGOLOGY
Payer: COMMERCIAL

## 2017-11-07 ENCOUNTER — ANESTHESIA (OUTPATIENT)
Dept: PERIOP | Facility: HOSPITAL | Age: 77
End: 2017-11-07
Payer: COMMERCIAL

## 2017-11-07 VITALS
DIASTOLIC BLOOD PRESSURE: 79 MMHG | HEART RATE: 89 BPM | BODY MASS INDEX: 16.48 KG/M2 | SYSTOLIC BLOOD PRESSURE: 169 MMHG | HEIGHT: 63 IN | OXYGEN SATURATION: 97 % | WEIGHT: 93 LBS | RESPIRATION RATE: 18 BRPM | TEMPERATURE: 98.2 F

## 2017-11-07 DIAGNOSIS — D37.02 NEOPLASM OF UNCERTAIN BEHAVIOR OF TONGUE: ICD-10-CM

## 2017-11-07 PROCEDURE — 88341 IMHCHEM/IMCYTCHM EA ADD ANTB: CPT | Performed by: OTOLARYNGOLOGY

## 2017-11-07 PROCEDURE — 88313 SPECIAL STAINS GROUP 2: CPT | Performed by: OTOLARYNGOLOGY

## 2017-11-07 PROCEDURE — 88309 TISSUE EXAM BY PATHOLOGIST: CPT | Performed by: OTOLARYNGOLOGY

## 2017-11-07 PROCEDURE — 88342 IMHCHEM/IMCYTCHM 1ST ANTB: CPT | Performed by: OTOLARYNGOLOGY

## 2017-11-07 RX ORDER — SUCCINYLCHOLINE/SOD CL,ISO/PF 100 MG/5ML
SYRINGE (ML) INTRAVENOUS AS NEEDED
Status: DISCONTINUED | OUTPATIENT
Start: 2017-11-07 | End: 2017-11-07 | Stop reason: SURG

## 2017-11-07 RX ORDER — MAGNESIUM HYDROXIDE 1200 MG/15ML
LIQUID ORAL AS NEEDED
Status: DISCONTINUED | OUTPATIENT
Start: 2017-11-07 | End: 2017-11-07 | Stop reason: HOSPADM

## 2017-11-07 RX ORDER — DEXAMETHASONE SODIUM PHOSPHATE 4 MG/ML
INJECTION, SOLUTION INTRA-ARTICULAR; INTRALESIONAL; INTRAMUSCULAR; INTRAVENOUS; SOFT TISSUE AS NEEDED
Status: DISCONTINUED | OUTPATIENT
Start: 2017-11-07 | End: 2017-11-07 | Stop reason: SURG

## 2017-11-07 RX ORDER — SODIUM CHLORIDE 9 MG/ML
125 INJECTION, SOLUTION INTRAVENOUS CONTINUOUS
Status: DISCONTINUED | OUTPATIENT
Start: 2017-11-07 | End: 2017-11-07 | Stop reason: HOSPADM

## 2017-11-07 RX ORDER — OXYCODONE HYDROCHLORIDE AND ACETAMINOPHEN 5; 325 MG/1; MG/1
2 TABLET ORAL EVERY 4 HOURS PRN
Status: DISCONTINUED | OUTPATIENT
Start: 2017-11-07 | End: 2017-11-07 | Stop reason: HOSPADM

## 2017-11-07 RX ORDER — EPHEDRINE SULFATE 50 MG/ML
INJECTION, SOLUTION INTRAVENOUS AS NEEDED
Status: DISCONTINUED | OUTPATIENT
Start: 2017-11-07 | End: 2017-11-07 | Stop reason: SURG

## 2017-11-07 RX ORDER — ONDANSETRON 2 MG/ML
INJECTION INTRAMUSCULAR; INTRAVENOUS AS NEEDED
Status: DISCONTINUED | OUTPATIENT
Start: 2017-11-07 | End: 2017-11-07 | Stop reason: SURG

## 2017-11-07 RX ORDER — ONDANSETRON 2 MG/ML
4 INJECTION INTRAMUSCULAR; INTRAVENOUS ONCE AS NEEDED
Status: DISCONTINUED | OUTPATIENT
Start: 2017-11-07 | End: 2017-11-07 | Stop reason: HOSPADM

## 2017-11-07 RX ORDER — ROCURONIUM BROMIDE 10 MG/ML
INJECTION, SOLUTION INTRAVENOUS AS NEEDED
Status: DISCONTINUED | OUTPATIENT
Start: 2017-11-07 | End: 2017-11-07 | Stop reason: SURG

## 2017-11-07 RX ORDER — FENTANYL CITRATE/PF 50 MCG/ML
25 SYRINGE (ML) INJECTION
Status: DISCONTINUED | OUTPATIENT
Start: 2017-11-07 | End: 2017-11-07 | Stop reason: HOSPADM

## 2017-11-07 RX ORDER — FENTANYL CITRATE 50 UG/ML
INJECTION, SOLUTION INTRAMUSCULAR; INTRAVENOUS AS NEEDED
Status: DISCONTINUED | OUTPATIENT
Start: 2017-11-07 | End: 2017-11-07 | Stop reason: SURG

## 2017-11-07 RX ORDER — OXYCODONE HYDROCHLORIDE AND ACETAMINOPHEN 5; 325 MG/1; MG/1
TABLET ORAL
Refills: 0
Start: 2017-11-07

## 2017-11-07 RX ORDER — ONDANSETRON 2 MG/ML
4 INJECTION INTRAMUSCULAR; INTRAVENOUS EVERY 6 HOURS PRN
Status: DISCONTINUED | OUTPATIENT
Start: 2017-11-07 | End: 2017-11-07 | Stop reason: HOSPADM

## 2017-11-07 RX ORDER — METOPROLOL TARTRATE 5 MG/5ML
2.5 INJECTION INTRAVENOUS ONCE
Status: COMPLETED | OUTPATIENT
Start: 2017-11-07 | End: 2017-11-07

## 2017-11-07 RX ORDER — LIDOCAINE HYDROCHLORIDE AND EPINEPHRINE 10; 10 MG/ML; UG/ML
INJECTION, SOLUTION INFILTRATION; PERINEURAL AS NEEDED
Status: DISCONTINUED | OUTPATIENT
Start: 2017-11-07 | End: 2017-11-07 | Stop reason: HOSPADM

## 2017-11-07 RX ORDER — SODIUM CHLORIDE 9 MG/ML
20 INJECTION, SOLUTION INTRAVENOUS CONTINUOUS
Status: DISCONTINUED | OUTPATIENT
Start: 2017-11-08 | End: 2017-11-11 | Stop reason: HOSPADM

## 2017-11-07 RX ORDER — MORPHINE SULFATE 2 MG/ML
1 INJECTION, SOLUTION INTRAMUSCULAR; INTRAVENOUS ONCE
Status: COMPLETED | OUTPATIENT
Start: 2017-11-07 | End: 2017-11-07

## 2017-11-07 RX ORDER — PROPOFOL 10 MG/ML
INJECTION, EMULSION INTRAVENOUS AS NEEDED
Status: DISCONTINUED | OUTPATIENT
Start: 2017-11-07 | End: 2017-11-07 | Stop reason: SURG

## 2017-11-07 RX ADMIN — ROCURONIUM BROMIDE 5 MG: 10 INJECTION, SOLUTION INTRAVENOUS at 07:53

## 2017-11-07 RX ADMIN — METOPROLOL TARTRATE 2.5 MG: 5 INJECTION, SOLUTION INTRAVENOUS at 07:15

## 2017-11-07 RX ADMIN — EPHEDRINE SULFATE 10 MG: 50 INJECTION, SOLUTION INTRAMUSCULAR; INTRAVENOUS; SUBCUTANEOUS at 08:02

## 2017-11-07 RX ADMIN — SODIUM CHLORIDE 125 ML/HR: 0.9 INJECTION, SOLUTION INTRAVENOUS at 06:54

## 2017-11-07 RX ADMIN — FENTANYL CITRATE 50 MCG: 50 INJECTION, SOLUTION INTRAMUSCULAR; INTRAVENOUS at 07:53

## 2017-11-07 RX ADMIN — DEXAMETHASONE SODIUM PHOSPHATE 8 MG: 4 INJECTION, SOLUTION INTRAMUSCULAR; INTRAVENOUS at 08:00

## 2017-11-07 RX ADMIN — LIDOCAINE HYDROCHLORIDE 60 MG: 20 INJECTION, SOLUTION INTRAVENOUS at 07:53

## 2017-11-07 RX ADMIN — PROPOFOL 150 MG: 10 INJECTION, EMULSION INTRAVENOUS at 07:53

## 2017-11-07 RX ADMIN — MORPHINE SULFATE 1 MG: 2 INJECTION, SOLUTION INTRAMUSCULAR; INTRAVENOUS at 07:12

## 2017-11-07 RX ADMIN — Medication 80 MG: at 07:53

## 2017-11-07 RX ADMIN — ONDANSETRON HYDROCHLORIDE 4 MG: 2 INJECTION, SOLUTION INTRAVENOUS at 08:02

## 2017-11-07 NOTE — OP NOTE
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]    Procedure(s) (LRB):  EXCISIONAL BIOPSY TONGUE MASS (Right)    Specimen(s):  ID Type Source Tests Collected by Time Destination   1 : right tongue mass - suture marks anterior margin  Tissue Tongue TISSUE EXAM Yareli Mcgovern DO 2017 0810        Estimated Blood Loss:   Minimal    Drains:       Anesthesia Type:   General    Operative Indications:  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]      Operative Findings:  Around hard neoplasm within the tongue muscle posterior portion measuring 3 7 x 2 5 cm     Complications:   None    Procedure and Technique:  Patient was identified in the holding area  She was marked on the right cheek to denote the laterality of the tongue case  She was taken to the operating room placed under general anesthesia with an endotracheal tube  The tube was taped to the left cheek  Patient was prepped and draped in the usual fashion for the above surgery  Formal time-out was obtained and patient name, date of birth, and procedure were noted to be correct  The tip of the tongue was grasped with a towel clamp and pulled anteriorly and manual palpation revealed a rounded mass at the posterior 1/3 of the tongue laterally on the right side  A gauze sponge was packed in the throat  1% xylocaine with 1 100,000 epinephrine was injected into the area to a total of 5 mL  I waited for approximately 5 minutes  I then used the Bovie cautery to elliptically incised around the submucosal mass at that time was being pulled anteriorly out of the mouth  This mass had the consistency of a rubber ball  It was smooth and ovoid in shape    The the measurement of the mass was 3 7 x 2 5 cm  After removal the defect size on the tongue measured 4 5 x 2 5 cm in diameter  The bed of the tongue was cauterized there was no bleeding  I then sutured the tongue from dorsal to ventral surface with interrupted 2 0 silk suture  This was completed throughout the entire length of the incision  There was no bleeding and there was still significant tongue musculature remaining  Throat pack was removed and a towel clip was removed  Patient was then awoken, extubated, taken to recovery in stable condition     I was present for the entire procedure    Patient Disposition:  PACU     SIGNATURE: Andres Handy DO  DATE: November 7, 2017  TIME: 8:22 AM

## 2017-11-07 NOTE — PROGRESS NOTES
D/C instructions reviewed w/ pt & spouse, verbalized understanding  R back of tongue incision remains intact w/ sutures  No bleeding  Remains numb, speech slurred  No swallowing difficulties w/ clear/soft food  Expectorating scant amount blood tinged mucuos into tissue  Has Rx at home

## 2017-11-07 NOTE — DISCHARGE INSTRUCTIONS
[facility] discharge instructions    1  Start with clear liquid diet  Advance as tolerated  2   Take pain meds as needed  Prescription was given in the office  3   For any bleeding gargle and spit ice water or suck on ice pop  If bleeding does not stop call Dr Kayla Camilo  4   Call Dr Kayla Camilo at [phone] for any bleeding uncontrolled with the above measures, significant difficulty with swallowing, or fever greater than 101°  5   [facility] office staff will call tomorrow to confirm postoperative appointment

## 2017-11-08 ENCOUNTER — HOSPITAL ENCOUNTER (OUTPATIENT)
Dept: INFUSION CENTER | Facility: CLINIC | Age: 77
Discharge: HOME | End: 2017-11-08
Payer: COMMERCIAL

## 2017-11-08 VITALS
SYSTOLIC BLOOD PRESSURE: 176 MMHG | BODY MASS INDEX: 16.07 KG/M2 | WEIGHT: 94.14 LBS | RESPIRATION RATE: 16 BRPM | TEMPERATURE: 98.2 F | DIASTOLIC BLOOD PRESSURE: 86 MMHG | HEIGHT: 64 IN

## 2017-11-08 PROCEDURE — 96417 CHEMO IV INFUS EACH ADDL SEQ: CPT

## 2017-11-08 PROCEDURE — 96413 CHEMO IV INFUSION 1 HR: CPT

## 2017-11-08 PROCEDURE — 96375 TX/PRO/DX INJ NEW DRUG ADDON: CPT

## 2017-11-08 RX ADMIN — DEXAMETHASONE SODIUM PHOSPHATE: 10 INJECTION, SOLUTION INTRAMUSCULAR; INTRAVENOUS at 10:10

## 2017-11-08 RX ADMIN — SODIUM CHLORIDE 20 ML/HR: 0.9 INJECTION, SOLUTION INTRAVENOUS at 09:50

## 2017-11-08 RX ADMIN — CARBOPLATIN 352 MG: 10 INJECTION, SOLUTION INTRAVENOUS at 12:47

## 2017-11-08 RX ADMIN — PACLITAXEL 112 MG: 100 INJECTION, POWDER, LYOPHILIZED, FOR SUSPENSION INTRAVENOUS at 11:46

## 2017-11-08 NOTE — PROGRESS NOTES
Pt  Tolerated Abraxane and Carboplatin without adverse event  Future appointment reviewed  AVS provided

## 2017-11-08 NOTE — PROGRESS NOTES
Pt  Denies new symptoms or concerns at this time  Labs reviewed and within normal parameters for ordered chemotherapy today  K+ 3 0  Spoke with Francisco Bright RN who requested to provide education related to increasing Potassium in diet  Written and verbal information given  Pt  Is unable to talk today related to surgery yesterday to remove a lump from under tongue  Dr Garry Rodriguez aware per note  Chemotherapy to continue while waiting for biopsy  Pt  Reports having pain post surgery and taking Percocet effectively at this time

## 2017-11-14 ENCOUNTER — APPOINTMENT (OUTPATIENT)
Dept: LAB | Facility: CLINIC | Age: 77
End: 2017-11-14
Payer: COMMERCIAL

## 2017-11-14 ENCOUNTER — LAB CONVERSION - ENCOUNTER (OUTPATIENT)
Dept: OTHER | Facility: OTHER | Age: 77
End: 2017-11-14

## 2017-11-14 DIAGNOSIS — C34.91 PRIMARY LUNG CANCER WITH METASTASIS FROM LUNG TO OTHER SITE, RIGHT (HCC): ICD-10-CM

## 2017-11-14 LAB
ALBUMIN SERPL BCP-MCNC: 3.1 G/DL (ref 3.5–5)
ALP SERPL-CCNC: 85 U/L (ref 46–116)
ALT SERPL W P-5'-P-CCNC: 15 U/L (ref 12–78)
ANION GAP SERPL CALCULATED.3IONS-SCNC: 7 MMOL/L (ref 4–13)
AST SERPL W P-5'-P-CCNC: 12 U/L (ref 5–45)
BASOPHILS # BLD AUTO: 0.05 THOUSANDS/ΜL (ref 0–0.1)
BASOPHILS NFR BLD AUTO: 1 % (ref 0–1)
BILIRUB SERPL-MCNC: 0.62 MG/DL (ref 0.2–1)
BUN SERPL-MCNC: 10 MG/DL (ref 5–25)
CALCIUM SERPL-MCNC: 9.4 MG/DL (ref 8.3–10.1)
CHLORIDE SERPL-SCNC: 96 MMOL/L (ref 100–108)
CO2 SERPL-SCNC: 30 MMOL/L (ref 21–32)
CREAT SERPL-MCNC: 0.46 MG/DL (ref 0.6–1.3)
EOSINOPHIL # BLD AUTO: 0.03 THOUSAND/ΜL (ref 0–0.61)
EOSINOPHIL NFR BLD AUTO: 1 % (ref 0–6)
ERYTHROCYTE [DISTWIDTH] IN BLOOD BY AUTOMATED COUNT: 16.1 % (ref 11.6–15.1)
GFR SERPL CREATININE-BSD FRML MDRD: 98 ML/MIN/1.73SQ M
GLUCOSE SERPL-MCNC: 110 MG/DL (ref 65–140)
HCT VFR BLD AUTO: 29.7 % (ref 34.8–46.1)
HGB BLD-MCNC: 9.7 G/DL (ref 11.5–15.4)
LYMPHOCYTES # BLD AUTO: 0.95 THOUSANDS/ΜL (ref 0.6–4.47)
LYMPHOCYTES NFR BLD AUTO: 17 % (ref 14–44)
MCH RBC QN AUTO: 32.1 PG (ref 26.8–34.3)
MCHC RBC AUTO-ENTMCNC: 32.7 G/DL (ref 31.4–37.4)
MCV RBC AUTO: 98 FL (ref 82–98)
MONOCYTES # BLD AUTO: 1.17 THOUSAND/ΜL (ref 0.17–1.22)
MONOCYTES NFR BLD AUTO: 20 % (ref 4–12)
NEUTROPHILS # BLD AUTO: 3.52 THOUSANDS/ΜL (ref 1.85–7.62)
NEUTS SEG NFR BLD AUTO: 61 % (ref 43–75)
NRBC BLD AUTO-RTO: 0 /100 WBCS
PLATELET # BLD AUTO: 485 THOUSANDS/UL (ref 149–390)
PMV BLD AUTO: 8.8 FL (ref 8.9–12.7)
POTASSIUM SERPL-SCNC: 3.1 MMOL/L (ref 3.5–5.3)
PROT SERPL-MCNC: 7.5 G/DL (ref 6.4–8.2)
RBC # BLD AUTO: 3.02 MILLION/UL (ref 3.81–5.12)
SODIUM SERPL-SCNC: 133 MMOL/L (ref 136–145)
WBC # BLD AUTO: 5.75 THOUSAND/UL (ref 4.31–10.16)

## 2017-11-14 PROCEDURE — 36415 COLL VENOUS BLD VENIPUNCTURE: CPT

## 2017-11-14 PROCEDURE — 85025 COMPLETE CBC W/AUTO DIFF WBC: CPT

## 2017-11-14 PROCEDURE — 80053 COMPREHEN METABOLIC PANEL: CPT

## 2017-11-14 RX ORDER — SODIUM CHLORIDE 9 MG/ML
20 INJECTION, SOLUTION INTRAVENOUS CONTINUOUS
Status: DISCONTINUED | OUTPATIENT
Start: 2017-11-15 | End: 2017-11-18 | Stop reason: HOSPADM

## 2017-11-15 ENCOUNTER — HOSPITAL ENCOUNTER (OUTPATIENT)
Dept: INFUSION CENTER | Facility: CLINIC | Age: 77
Discharge: HOME | End: 2017-11-15
Payer: COMMERCIAL

## 2017-11-15 VITALS
WEIGHT: 90.39 LBS | HEIGHT: 64 IN | DIASTOLIC BLOOD PRESSURE: 92 MMHG | HEART RATE: 92 BPM | SYSTOLIC BLOOD PRESSURE: 141 MMHG | TEMPERATURE: 97.5 F | BODY MASS INDEX: 15.43 KG/M2 | RESPIRATION RATE: 16 BRPM

## 2017-11-15 PROCEDURE — 96375 TX/PRO/DX INJ NEW DRUG ADDON: CPT

## 2017-11-15 PROCEDURE — 96413 CHEMO IV INFUSION 1 HR: CPT

## 2017-11-15 RX ADMIN — DEXAMETHASONE SODIUM PHOSPHATE: 10 INJECTION, SOLUTION INTRAMUSCULAR; INTRAVENOUS at 10:14

## 2017-11-15 RX ADMIN — SODIUM CHLORIDE 20 ML/HR: 0.9 INJECTION, SOLUTION INTRAVENOUS at 10:15

## 2017-11-15 RX ADMIN — PACLITAXEL 112 MG: 100 INJECTION, POWDER, LYOPHILIZED, FOR SUSPENSION INTRAVENOUS at 10:29

## 2017-11-17 ENCOUNTER — GENERIC CONVERSION - ENCOUNTER (OUTPATIENT)
Dept: FAMILY MEDICINE CLINIC | Facility: CLINIC | Age: 77
End: 2017-11-17

## 2017-11-20 ENCOUNTER — TRANSCRIBE ORDERS (OUTPATIENT)
Dept: LAB | Facility: CLINIC | Age: 77
End: 2017-11-20

## 2017-11-20 ENCOUNTER — APPOINTMENT (OUTPATIENT)
Dept: LAB | Facility: CLINIC | Age: 77
End: 2017-11-20
Payer: COMMERCIAL

## 2017-11-20 DIAGNOSIS — C34.91 PRIMARY LUNG CANCER WITH METASTASIS FROM LUNG TO OTHER SITE, RIGHT (HCC): ICD-10-CM

## 2017-11-20 LAB
ALBUMIN SERPL BCP-MCNC: 2.8 G/DL (ref 3.5–5)
ALP SERPL-CCNC: 77 U/L (ref 46–116)
ALT SERPL W P-5'-P-CCNC: 12 U/L (ref 12–78)
ANION GAP SERPL CALCULATED.3IONS-SCNC: 8 MMOL/L (ref 4–13)
AST SERPL W P-5'-P-CCNC: 10 U/L (ref 5–45)
BASOPHILS # BLD AUTO: 0.04 THOUSANDS/ΜL (ref 0–0.1)
BASOPHILS NFR BLD AUTO: 1 % (ref 0–1)
BILIRUB SERPL-MCNC: 0.52 MG/DL (ref 0.2–1)
BUN SERPL-MCNC: 9 MG/DL (ref 5–25)
CALCIUM SERPL-MCNC: 9.1 MG/DL (ref 8.3–10.1)
CHLORIDE SERPL-SCNC: 96 MMOL/L (ref 100–108)
CO2 SERPL-SCNC: 29 MMOL/L (ref 21–32)
CREAT SERPL-MCNC: 0.4 MG/DL (ref 0.6–1.3)
EOSINOPHIL # BLD AUTO: 0.05 THOUSAND/ΜL (ref 0–0.61)
EOSINOPHIL NFR BLD AUTO: 1 % (ref 0–6)
ERYTHROCYTE [DISTWIDTH] IN BLOOD BY AUTOMATED COUNT: 15.7 % (ref 11.6–15.1)
GFR SERPL CREATININE-BSD FRML MDRD: 102 ML/MIN/1.73SQ M
GLUCOSE P FAST SERPL-MCNC: 101 MG/DL (ref 65–99)
HCT VFR BLD AUTO: 27.2 % (ref 34.8–46.1)
HGB BLD-MCNC: 9 G/DL (ref 11.5–15.4)
LYMPHOCYTES # BLD AUTO: 0.73 THOUSANDS/ΜL (ref 0.6–4.47)
LYMPHOCYTES NFR BLD AUTO: 16 % (ref 14–44)
MCH RBC QN AUTO: 32.5 PG (ref 26.8–34.3)
MCHC RBC AUTO-ENTMCNC: 33.1 G/DL (ref 31.4–37.4)
MCV RBC AUTO: 98 FL (ref 82–98)
MONOCYTES # BLD AUTO: 0.85 THOUSAND/ΜL (ref 0.17–1.22)
MONOCYTES NFR BLD AUTO: 19 % (ref 4–12)
NEUTROPHILS # BLD AUTO: 2.75 THOUSANDS/ΜL (ref 1.85–7.62)
NEUTS SEG NFR BLD AUTO: 63 % (ref 43–75)
NRBC BLD AUTO-RTO: 0 /100 WBCS
PLATELET # BLD AUTO: 471 THOUSANDS/UL (ref 149–390)
PMV BLD AUTO: 9.2 FL (ref 8.9–12.7)
POTASSIUM SERPL-SCNC: 3.6 MMOL/L (ref 3.5–5.3)
PROT SERPL-MCNC: 6.9 G/DL (ref 6.4–8.2)
RBC # BLD AUTO: 2.77 MILLION/UL (ref 3.81–5.12)
SODIUM SERPL-SCNC: 133 MMOL/L (ref 136–145)
WBC # BLD AUTO: 4.44 THOUSAND/UL (ref 4.31–10.16)

## 2017-11-20 PROCEDURE — 36415 COLL VENOUS BLD VENIPUNCTURE: CPT

## 2017-11-20 PROCEDURE — 80053 COMPREHEN METABOLIC PANEL: CPT

## 2017-11-20 PROCEDURE — 85025 COMPLETE CBC W/AUTO DIFF WBC: CPT

## 2017-11-20 RX ORDER — SODIUM CHLORIDE 9 MG/ML
20 INJECTION, SOLUTION INTRAVENOUS CONTINUOUS
Status: DISCONTINUED | OUTPATIENT
Start: 2017-11-21 | End: 2017-11-24 | Stop reason: HOSPADM

## 2017-11-21 ENCOUNTER — HOSPITAL ENCOUNTER (OUTPATIENT)
Dept: INFUSION CENTER | Facility: CLINIC | Age: 77
Discharge: HOME | End: 2017-11-21
Payer: COMMERCIAL

## 2017-11-21 VITALS
HEART RATE: 85 BPM | WEIGHT: 93 LBS | RESPIRATION RATE: 18 BRPM | TEMPERATURE: 98.8 F | BODY MASS INDEX: 15.88 KG/M2 | SYSTOLIC BLOOD PRESSURE: 153 MMHG | HEIGHT: 64 IN | DIASTOLIC BLOOD PRESSURE: 82 MMHG

## 2017-11-21 PROCEDURE — 96413 CHEMO IV INFUSION 1 HR: CPT

## 2017-11-21 PROCEDURE — 96367 TX/PROPH/DG ADDL SEQ IV INF: CPT

## 2017-11-21 RX ADMIN — DEXAMETHASONE SODIUM PHOSPHATE: 10 INJECTION, SOLUTION INTRAMUSCULAR; INTRAVENOUS at 09:47

## 2017-11-21 RX ADMIN — SODIUM CHLORIDE 20 ML/HR: 0.9 INJECTION, SOLUTION INTRAVENOUS at 09:46

## 2017-11-21 RX ADMIN — PACLITAXEL 112 MG: 100 INJECTION, POWDER, LYOPHILIZED, FOR SUSPENSION INTRAVENOUS at 10:53

## 2017-11-21 NOTE — PLAN OF CARE
Problem: Potential for Falls  Goal: Patient will remain free of falls  INTERVENTIONS:  - Assess patient frequently for physical needs  -  Identify cognitive and physical deficits and behaviors that affect risk of falls    -  [place] fall precautions as indicated by assessment   - Educate patient/family on patient safety including physical limitations  - Instruct patient to call for assistance with activity based on assessment  - Modify environment to reduce risk of injury  - Consider OT/PT consult to assist with strengthening/mobility   Outcome: Progressing

## 2017-11-21 NOTE — PROGRESS NOTES
Patient tolerated chemotherapy today without incident  Patient provided with AVS, left unit in stable condition with

## 2017-11-22 ENCOUNTER — HOSPITAL ENCOUNTER (OUTPATIENT)
Dept: RADIOLOGY | Age: 77
Discharge: HOME | End: 2017-11-22
Payer: COMMERCIAL

## 2017-11-22 VITALS — BODY MASS INDEX: 15.73 KG/M2 | WEIGHT: 91.6 LBS

## 2017-11-22 DIAGNOSIS — C79.89 SECONDARY MALIGNANT NEOPLASM OF OTHER SPECIFIED SITES (CODE): ICD-10-CM

## 2017-11-22 DIAGNOSIS — C34.91 MALIGNANT NEOPLASM OF UNSPECIFIED PART OF RIGHT BRONCHUS OR LUNG (HCC): ICD-10-CM

## 2017-11-22 DIAGNOSIS — C34.90 MALIGNANT NEOPLASM OF UNSPECIFIED PART OF UNSPECIFIED BRONCHUS OR LUNG (HCC): ICD-10-CM

## 2017-11-22 LAB — GLUCOSE SERPL-MCNC: 89 MG/DL (ref 65–140)

## 2017-11-22 PROCEDURE — 82948 REAGENT STRIP/BLOOD GLUCOSE: CPT

## 2017-11-22 PROCEDURE — 78815 PET IMAGE W/CT SKULL-THIGH: CPT

## 2017-11-22 PROCEDURE — A9552 F18 FDG: HCPCS

## 2017-11-22 RX ADMIN — IOHEXOL 5 ML: 240 INJECTION, SOLUTION INTRATHECAL; INTRAVASCULAR; INTRAVENOUS; ORAL at 08:25

## 2017-11-28 ENCOUNTER — APPOINTMENT (OUTPATIENT)
Dept: LAB | Facility: CLINIC | Age: 77
End: 2017-11-28
Payer: COMMERCIAL

## 2017-11-28 DIAGNOSIS — C34.91 PRIMARY LUNG CANCER WITH METASTASIS FROM LUNG TO OTHER SITE, RIGHT (HCC): ICD-10-CM

## 2017-11-28 LAB
ALBUMIN SERPL BCP-MCNC: 2.9 G/DL (ref 3.5–5)
ALP SERPL-CCNC: 83 U/L (ref 46–116)
ALT SERPL W P-5'-P-CCNC: 17 U/L (ref 12–78)
ANION GAP SERPL CALCULATED.3IONS-SCNC: 8 MMOL/L (ref 4–13)
AST SERPL W P-5'-P-CCNC: 13 U/L (ref 5–45)
BASOPHILS # BLD AUTO: 0.03 THOUSANDS/ΜL (ref 0–0.1)
BASOPHILS NFR BLD AUTO: 1 % (ref 0–1)
BILIRUB SERPL-MCNC: 0.52 MG/DL (ref 0.2–1)
BUN SERPL-MCNC: 7 MG/DL (ref 5–25)
CALCIUM SERPL-MCNC: 9.4 MG/DL (ref 8.3–10.1)
CHLORIDE SERPL-SCNC: 95 MMOL/L (ref 100–108)
CO2 SERPL-SCNC: 29 MMOL/L (ref 21–32)
CREAT SERPL-MCNC: 0.41 MG/DL (ref 0.6–1.3)
EOSINOPHIL # BLD AUTO: 0.04 THOUSAND/ΜL (ref 0–0.61)
EOSINOPHIL NFR BLD AUTO: 1 % (ref 0–6)
ERYTHROCYTE [DISTWIDTH] IN BLOOD BY AUTOMATED COUNT: 15.4 % (ref 11.6–15.1)
GFR SERPL CREATININE-BSD FRML MDRD: 101 ML/MIN/1.73SQ M
GLUCOSE P FAST SERPL-MCNC: 103 MG/DL (ref 65–99)
HCT VFR BLD AUTO: 29.8 % (ref 34.8–46.1)
HGB BLD-MCNC: 9.6 G/DL (ref 11.5–15.4)
LYMPHOCYTES # BLD AUTO: 0.91 THOUSANDS/ΜL (ref 0.6–4.47)
LYMPHOCYTES NFR BLD AUTO: 15 % (ref 14–44)
MCH RBC QN AUTO: 32 PG (ref 26.8–34.3)
MCHC RBC AUTO-ENTMCNC: 32.2 G/DL (ref 31.4–37.4)
MCV RBC AUTO: 99 FL (ref 82–98)
MONOCYTES # BLD AUTO: 1.45 THOUSAND/ΜL (ref 0.17–1.22)
MONOCYTES NFR BLD AUTO: 25 % (ref 4–12)
NEUTROPHILS # BLD AUTO: 3.44 THOUSANDS/ΜL (ref 1.85–7.62)
NEUTS SEG NFR BLD AUTO: 58 % (ref 43–75)
NRBC BLD AUTO-RTO: 0 /100 WBCS
PLATELET # BLD AUTO: 494 THOUSANDS/UL (ref 149–390)
PMV BLD AUTO: 9.1 FL (ref 8.9–12.7)
POTASSIUM SERPL-SCNC: 3.8 MMOL/L (ref 3.5–5.3)
PROT SERPL-MCNC: 7.4 G/DL (ref 6.4–8.2)
RBC # BLD AUTO: 3 MILLION/UL (ref 3.81–5.12)
SODIUM SERPL-SCNC: 132 MMOL/L (ref 136–145)
WBC # BLD AUTO: 5.9 THOUSAND/UL (ref 4.31–10.16)

## 2017-11-28 PROCEDURE — 80053 COMPREHEN METABOLIC PANEL: CPT

## 2017-11-28 PROCEDURE — 36415 COLL VENOUS BLD VENIPUNCTURE: CPT

## 2017-11-28 PROCEDURE — 85025 COMPLETE CBC W/AUTO DIFF WBC: CPT

## 2017-11-28 RX ORDER — SODIUM CHLORIDE 9 MG/ML
20 INJECTION, SOLUTION INTRAVENOUS CONTINUOUS
Status: DISCONTINUED | OUTPATIENT
Start: 2017-11-29 | End: 2017-12-02 | Stop reason: HOSPADM

## 2017-11-29 ENCOUNTER — HOSPITAL ENCOUNTER (OUTPATIENT)
Dept: INFUSION CENTER | Facility: CLINIC | Age: 77
Discharge: HOME | End: 2017-11-29
Payer: COMMERCIAL

## 2017-11-29 VITALS
SYSTOLIC BLOOD PRESSURE: 147 MMHG | WEIGHT: 91.71 LBS | RESPIRATION RATE: 18 BRPM | DIASTOLIC BLOOD PRESSURE: 80 MMHG | HEIGHT: 63 IN | BODY MASS INDEX: 16.25 KG/M2 | HEART RATE: 86 BPM | TEMPERATURE: 97.9 F

## 2017-11-29 PROCEDURE — 96413 CHEMO IV INFUSION 1 HR: CPT

## 2017-11-29 PROCEDURE — 96417 CHEMO IV INFUS EACH ADDL SEQ: CPT

## 2017-11-29 PROCEDURE — 96367 TX/PROPH/DG ADDL SEQ IV INF: CPT

## 2017-11-29 RX ADMIN — CARBOPLATIN 354 MG: 10 INJECTION, SOLUTION INTRAVENOUS at 10:43

## 2017-11-29 RX ADMIN — DEXAMETHASONE SODIUM PHOSPHATE: 10 INJECTION, SOLUTION INTRAMUSCULAR; INTRAVENOUS at 09:51

## 2017-11-29 RX ADMIN — PACLITAXEL 112 MG: 100 INJECTION, POWDER, LYOPHILIZED, FOR SUSPENSION INTRAVENOUS at 11:46

## 2017-11-29 RX ADMIN — SODIUM CHLORIDE 20 ML/HR: 0.9 INJECTION, SOLUTION INTRAVENOUS at 09:43

## 2017-11-29 NOTE — PROGRESS NOTES
Upon discharge, pt's IV site noted to be leaking  Pt denies any discomfort  IV flushed and good blood return noted

## 2017-11-29 NOTE — PLAN OF CARE
Problem: Potential for Falls  Goal: Patient will remain free of falls  INTERVENTIONS:  - Assess patient frequently for physical needs  -  Identify cognitive and physical deficits and behaviors that affect risk of falls    -  [place] fall precautions as indicated by assessment   - Educate patient/family on patient safety including physical limitations  - Instruct patient to call for assistance with activity based on assessment  - Modify environment to reduce risk of injury  - Consider OT/PT consult to assist with strengthening/mobility   Outcome: Progressing

## 2017-11-30 ENCOUNTER — ALLSCRIPTS OFFICE VISIT (OUTPATIENT)
Dept: OTHER | Facility: OTHER | Age: 77
End: 2017-11-30

## 2017-12-05 ENCOUNTER — APPOINTMENT (OUTPATIENT)
Dept: LAB | Facility: CLINIC | Age: 77
End: 2017-12-05
Payer: COMMERCIAL

## 2017-12-05 DIAGNOSIS — C79.89 SECONDARY MALIGNANT NEOPLASM OF OTHER SPECIFIED SITES (CODE): ICD-10-CM

## 2017-12-05 LAB
ALBUMIN SERPL BCP-MCNC: 2.8 G/DL (ref 3.5–5)
ALP SERPL-CCNC: 86 U/L (ref 46–116)
ALT SERPL W P-5'-P-CCNC: 14 U/L (ref 12–78)
ANION GAP SERPL CALCULATED.3IONS-SCNC: 8 MMOL/L (ref 4–13)
AST SERPL W P-5'-P-CCNC: 12 U/L (ref 5–45)
BASOPHILS # BLD AUTO: 0.06 THOUSANDS/ΜL (ref 0–0.1)
BASOPHILS NFR BLD AUTO: 1 % (ref 0–1)
BILIRUB SERPL-MCNC: 0.38 MG/DL (ref 0.2–1)
BUN SERPL-MCNC: 10 MG/DL (ref 5–25)
CALCIUM SERPL-MCNC: 9 MG/DL (ref 8.3–10.1)
CHLORIDE SERPL-SCNC: 92 MMOL/L (ref 100–108)
CO2 SERPL-SCNC: 29 MMOL/L (ref 21–32)
CREAT SERPL-MCNC: 0.45 MG/DL (ref 0.6–1.3)
EOSINOPHIL # BLD AUTO: 0.03 THOUSAND/ΜL (ref 0–0.61)
EOSINOPHIL NFR BLD AUTO: 0 % (ref 0–6)
ERYTHROCYTE [DISTWIDTH] IN BLOOD BY AUTOMATED COUNT: 15.5 % (ref 11.6–15.1)
GFR SERPL CREATININE-BSD FRML MDRD: 98 ML/MIN/1.73SQ M
GLUCOSE P FAST SERPL-MCNC: 94 MG/DL (ref 65–99)
HCT VFR BLD AUTO: 29.2 % (ref 34.8–46.1)
HGB BLD-MCNC: 9.8 G/DL (ref 11.5–15.4)
LYMPHOCYTES # BLD AUTO: 0.81 THOUSANDS/ΜL (ref 0.6–4.47)
LYMPHOCYTES NFR BLD AUTO: 11 % (ref 14–44)
MCH RBC QN AUTO: 33 PG (ref 26.8–34.3)
MCHC RBC AUTO-ENTMCNC: 33.6 G/DL (ref 31.4–37.4)
MCV RBC AUTO: 98 FL (ref 82–98)
MONOCYTES # BLD AUTO: 1.74 THOUSAND/ΜL (ref 0.17–1.22)
MONOCYTES NFR BLD AUTO: 24 % (ref 4–12)
NEUTROPHILS # BLD AUTO: 4.58 THOUSANDS/ΜL (ref 1.85–7.62)
NEUTS SEG NFR BLD AUTO: 64 % (ref 43–75)
NRBC BLD AUTO-RTO: 0 /100 WBCS
PLATELET # BLD AUTO: 501 THOUSANDS/UL (ref 149–390)
PMV BLD AUTO: 8.8 FL (ref 8.9–12.7)
POTASSIUM SERPL-SCNC: 3.5 MMOL/L (ref 3.5–5.3)
PROT SERPL-MCNC: 7.3 G/DL (ref 6.4–8.2)
RBC # BLD AUTO: 2.97 MILLION/UL (ref 3.81–5.12)
SODIUM SERPL-SCNC: 129 MMOL/L (ref 136–145)
TSH SERPL DL<=0.05 MIU/L-ACNC: 0.91 UIU/ML (ref 0.36–3.74)
WBC # BLD AUTO: 7.26 THOUSAND/UL (ref 4.31–10.16)

## 2017-12-05 PROCEDURE — 84443 ASSAY THYROID STIM HORMONE: CPT

## 2017-12-05 PROCEDURE — 85025 COMPLETE CBC W/AUTO DIFF WBC: CPT

## 2017-12-05 PROCEDURE — 80053 COMPREHEN METABOLIC PANEL: CPT

## 2017-12-05 PROCEDURE — 36415 COLL VENOUS BLD VENIPUNCTURE: CPT

## 2017-12-05 RX ORDER — SODIUM CHLORIDE 9 MG/ML
20 INJECTION, SOLUTION INTRAVENOUS CONTINUOUS
Status: DISCONTINUED | OUTPATIENT
Start: 2017-12-06 | End: 2017-12-09 | Stop reason: HOSPADM

## 2017-12-05 NOTE — PROGRESS NOTES
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)   4  Bone metastases (198 5) (C79 51)   5  Hypertension (401 9) (I10)    Discussion/Summary    1 : Squamous cell carcinoma of the lung: Patient to follow with Hematology/Oncology later today  At this point, she will consider whether not she wishes to continue, but did not make any decisions with me today  2 : Metastatic squamous cell carcinoma to soft tissue: Again, patient will follow up with Oncology later on today, and re-evaluate  Her surgical oncology visit released her to Medical Oncology  3 : Malignant neoplasm of the tongue: Patient to follow with Hematology/Oncology, as well as ENT  No changes at the moment  4 : Bony metastasis: PET scan did show some significant abnormalities  This was briefly discussed with the patient, but she will further discuss it later with Hematology/Oncology  Again, I did have a discussion with her about the possibility of further aggressive treatment versus non treatment, i e  hospice care  At the moment, she preferred to talk to Hematology/Oncology before making any decisions  5 : Hypertension: Stable  Blood pressure 1 using child size cuff was normal       Chief Complaint  Follow up: HTN, Hyponatremia, Metastatic Scc  History of Present Illness  Patient recently did have evaluation with Surgical Oncology, it was found that she had more metastasis  Laboratory studies recently done for Dr Janell Hernandez  Patient had further evaluation done with PET scan, also done for Dr Janell Hernandez  Brief review did show that the PET scan showed worsening of disease, with more metastasis, as well as some new findings that may need further follow-up  The lung lesion did not appear to be any changed based on the PET scan  We discussed metastatic cancer, lung cancer, hospice, palliative care   At this point, the patient is still interested in further evaluation with Oncology  She is feeling fatigued at times, but overall better  Her blood pressure was somewhat low today and she was concerned about that  She is on metoprolol for blood pressure  For the carcinoma of the tongue, patient was following with Dr Jayna Green  Review of Systems    Constitutional: No fever, no chills, feels well, no tiredness, no recent weight gain or weight loss and as noted in HPI  Eyes: No complaints of eye pain, no red eyes, no eyesight problems, no discharge, no dry eyes, no itching of eyes  ENT: no complaints of earache, no loss of hearing, no nose bleeds, no nasal discharge, no sore throat, no hoarseness  Cardiovascular: No complaints of slow heart rate, no fast heart rate, no chest pain, no palpitations, no leg claudication, no lower extremity edema  Respiratory: No complaints of shortness of breath, no wheezing, no cough, no SOB on exertion, no orthopnea, no PND  Gastrointestinal: No complaints of abdominal pain, no constipation, no nausea or vomiting, no diarrhea, no bloody stools  Genitourinary: No complaints of dysuria, no incontinence, no pelvic pain, no dysmenorrhea, no vaginal discharge or bleeding  Musculoskeletal: arthralgias and myalgias  Integumentary: as noted in HPI  Neurological: No complaints of headache, no confusion, no convulsions, no numbness, no dizziness or fainting, no tingling, no limb weakness, no difficulty walking  Psychiatric: Not suicidal, no sleep disturbance, no anxiety or depression, no change in personality, no emotional problems  Endocrine: No complaints of proptosis, no hot flashes, no muscle weakness, no deepening of the voice, no feelings of weakness  Hematologic/Lymphatic: No complaints of swollen glands, no swollen glands in the neck, does not bleed easily, does not bruise easily  Active Problems    1  Anxiety (300 00) (F41 9)   2  Bone metastases (198 5) (C79 51)   3  History of leukocytosis (V12 3) (Z86 2)   4  Hypertension (401 9) (I10)   5  Hyponatremia (276 1) (E87 1)   6  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)   7  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)   8  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 9) (C34 91)   9  Pain, joint, shoulder (719 41) (M25 519)   10  Pleural effusion (511 9) (J90)   11  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 9) (C34 90)   12  Subcutaneous nodule (782 2) (R22 9)   13  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)    Past Medical History    1  History of Cellulitis of both feet (682 7) (L03 115,L03 116)   2  History of leukocytosis (V12 3) (Z86 2)   3  History of Lower leg edema (782 3) (R60 0)    The active problems and past medical history were reviewed and updated today  Surgical History    1  History of Appendectomy   2  History of Gynecologic Services Insertion Of Pessary Ring   3  History of Tonsillectomy    The surgical history was reviewed and updated today  Family History  Mother    1  Family history of dementia (V17 2) (Z81 8)  Father    2  Family history of cerebrovascular accident (CVA) (V17 1) (Z82 3)    The family history was reviewed and updated today  Social History    · Employed   · Former smoker (O42 98) (I83 277)   ·   The social history was reviewed and updated today  The social history was reviewed and is unchanged  Current Meds   1  Abraxane 100 MG Intravenous Suspension Reconstituted; Therapy: 64Iyi1553 to Recorded   2  ALPRAZolam 0 25 MG Oral Tablet; take 1 tablet at bedtime as needed; Therapy: 12QBV0092 to (Last Rx:05Oct2017) Ordered   3  CARBOplatin 150 MG/15ML Intravenous Solution; Therapy: 78Ktt7629 to Recorded   4  Fiber TABS; Therapy: (Recorded:46Odf3800) to Recorded   5  Metoprolol Succinate ER 25 MG Oral Tablet Extended Release 24 Hour; take 0 5 tablet   daily;    The[facility]: 94ZQN9631 to ([id number])  Requested for: 98PRF6187; Last Rx: 50CAV6911 Ordered   6  TraMADol HCl - 50 MG Oral Tablet; TAKE 1 TABLET EVERY 6 HOURS AS NEEDED FOR   BREAKTHROUGH PAIN;   Therapy: 75RYH6571 to (Evaluate:05Nov2017); Last Rx:06Oct2017 Ordered   7  Zolpidem Tartrate 5 MG Oral Tablet; Take 1 or 2 at bedtime for sleep; Therapy: 91YZE2786 to (SRVINTRF:69ISW6368); Last Rx:25Oct2017 Ordered    The medication list was reviewed and updated today  Allergies    1  No Known Drug Allergies    Vitals  Vital Signs    Recorded: 91DPU8359 12:04PM Recorded: 55ROC4459 11:37AM   Heart Rate  96, L Radial   Pulse Quality  Irregular, L Radial   Systolic 171 072, LUE, Sitting   Diastolic 60 50, LUE, Sitting   Height  5 ft 3 in   Weight  93 lb 12 8 oz   BMI Calculated  16 62   BSA Calculated  1 4     Physical Exam    Constitutional   General appearance: No acute distress, well appearing and well nourished  Pulmonary   Respiratory effort: No increased work of breathing or signs of respiratory distress  Auscultation of lungs: Clear to auscultation  Cardiovascular   Auscultation of heart: Normal rate and rhythm, normal S1 and S2, without murmurs  Carotid pulses: Normal     Skin   Skin and subcutaneous tissue: Abnormal   (Large somewhat painful lesion on the right side posterior thoracic spine consistent with metastasis  This was noted on exam of lungs today)        Results/Data  (1) CBC/PLT/DIFF [id number] 09:06AM Pepe Mauricio     Test Name Result Flag Reference   WBC COUNT 5 90 Thousand/uL  4 31-10 16   RBC COUNT 3 00 Million/uL L 3 81-5 12   HEMOGLOBIN 9 6 g/dL L 11 5-15 4   HEMATOCRIT 29 8 % L 34 8-46  1   MCV 99 fL H 82-98   MCH 32 0 pg  26 8-34 3   MCHC 32 2 g/dL  31 4-37 4   RDW 15 4 % H 11 6-15 1   MPV 9 1 fL  8 9-12 7   PLATELET COUNT 478 Thousands/uL H 149-390   nRBC AUTOMATED 0 /100 WBCs     NEUTROPHILS RELATIVE PERCENT 58 %  43-75   LYMPHOCYTES RELATIVE PERCENT 15 %  14-44   MONOCYTES RELATIVE PERCENT 25 % H 4-12   EOSINOPHILS RELATIVE PERCENT 1 %  0-6 BASOPHILS RELATIVE PERCENT 1 %  0-1   NEUTROPHILS ABSOLUTE COUNT 3 44 Thousands/? ??L  1 85-7 62   LYMPHOCYTES ABSOLUTE COUNT 0 91 Thousands/? ??L  0 60-4 47   MONOCYTES ABSOLUTE COUNT 1 45 Thousand/? ??L H 0 17-1 22   EOSINOPHILS ABSOLUTE COUNT 0 04 Thousand/? ??L  0 00-0 61   BASOPHILS ABSOLUTE COUNT 0 03 Thousands/? ??L  0 00-0 10   This is a patient instruction: This test is non-fasting  Please drink two glasses of water morning of bloodwork  (1) COMPREHENSIVE METABOLIC PANEL [id number] 92:68YU [place]     Test Name Result Flag Reference   SODIUM 132 mmol/L L 136-145   POTASSIUM 3 8 mmol/L  3 5-5 3   CHLORIDE 95 mmol/L L 100-108   CARBON DIOXIDE 29 mmol/L  21-32   ANION GAP (CALC) 8 mmol/L  4-13   BLOOD UREA NITROGEN 7 mg/dL  5-25   CREATININE 0 41 mg/dL L 0 60-1 30   Standardized to IDMS reference method   CALCIUM 9 4 mg/dL  8 3-10 1   BILI, TOTAL 0 52 mg/dL  0 20-1 00   ALK PHOSPHATAS 83 U/L     ALT (SGPT) 17 U/L  12-78   Specimen collection should occur prior to Sulfasalazine and/or Sulfapyridine administration due to the potential for falsely depressed results  AST(SGOT) 13 U/L  5-45   Specimen collection should occur prior to Sulfasalazine administration due to the potential for falsely depressed results  ALBUMIN 2 9 g/dL L 3 5-5 0   TOTAL PROTEIN 7 4 g/dL  6 4-8 2   eGFR 101 ml/min/1 73sq [facility] Disease Education Program recommendations are as follows:  GFR calculation is accurate only with a steady state creatinine  Chronic Kidney disease less than 60 ml/min/1 73 sq  meters  Kidney failure less than 15 ml/min/1 73 sq  meters  GLUCOSE FASTING 103 mg/dL H 65-99   Specimen collection should occur prior to Sulfasalazine administration due to the potential for falsely depressed results  Specimen collection should occur prior to Sulfapyridine administration due to the potential for falsely elevated results       * NM PET CT SKULL BASE TO MID THIGH 22Nov2017 07:41AM [place] TW Order Number: [id number]    - Patient Instructions: [address]     Test Name Result Flag Reference   NM PET CT SKULL BASE TO MID THIGH (Report)     PET/CT SCAN     INDICATION: C34 91: Malignant neoplasm of unspecified part of right bronchus or lung  History taken directly from the electronic ordering system  Right tongue and left gluteal metastases, restaging post chemotherapy for treatment management     MODIFIER:   PS      COMPARISON: 9/26/2017 and priors     CELL TYPE: Squamous cell carcinoma, right bronchus biopsy 6/7/2017     TECHNIQUE:  7 9 mCi F-18-FDG administered IV  Multiplanar attenuation corrected and non attenuation corrected PET images are available for interpretation, and contiguous, low dose, axial CT sections were obtained from the skull base through the femurs    following the administration of oral contrast material (7 5 cc Omnipaque-240 in 300 cc water)  Intravenous contrast material was not utilized  This examination, like all CT scans performed in the [facility], was performed utilizing    techniques to minimize radiation dose exposure, including the use of iterative reconstruction and automated exposure control  Fasting serum glucose: 89 mg/dl     FINDINGS:      VISUALIZED BRAIN:     There is new asymmetric activity in the right thalamic region, SUV measuring 7 3  Corresponding left flank region has an SUV of 5 3  Significance is uncertain at this time  HEAD/NECK:     Persistent focal hypermetabolism in the right oral cavity/tongue region, SUV measuring 5 7, prior SUV 11 2  Significant increase in size and hypermetabolism of the left neck mass centered at the level of the left thyroid , SUV measuring 23 8, prior SUV 19 8  This mass measures approximately 4 5 x 4 3 cm in axial dimensions on series 3 image 66   There are    also multiple new hypermetabolic metastatic nodes in the left neck from the level of the left submandibular gland to the left supraclavicular region  Adenopathy also extends into the mediastinum and left paraspinal region  There is also a new    hypermetabolic lesion in the right posterior paraspinal musculature at the cervicothoracic junction, SUV 21 4  CT images: Trachea is deviated to the right due to mass effect from the adjacent left neck mass  CHEST:  Mildly increased hypermetabolism of large right lower lung mass, compatible with viable tumor  This measures 4 3 x 3 6 cm, SUV 19  Prior measurement 4 9 x 3 3 cm, SUV 16 6  Stable mild FDG activity associated with the right upper cavitary lung lesion with mural nodularity, SUV 0 7, prior SUV 0 7  Significant increased size and hypermetabolism of left upper medial pleural-based mass, SUV measuring 17, prior SUV 18 5  On series 3 image 73, this mass measures approximately 4 7 x 2 9 cm  Prior measurement 2 4 x 1 2 cm  There is associated erosion of   the medial 3rd rib and adjacent T3 transverse process  There also appears to be extension through the neural foramen to the spinal canal at this level  Infiltration into the left upper lung parenchyma is also noted  Significant enlargement of subcarinal adenopathy, measuring approximately 3 8 x 3 7 cm, SUV 30 3  Prior measurement 1 8 x 1 4 cm, SUV 24 3  Increased right hilar metabolic adenopathy, SUV 16  Prior SUV in this region 3 4  Enlargement of subcutaneous metastatic nodule along the posterior right back series 3 image 93 measuring 1 3 x 1 1 cm, SUV 16 9  Prior SUV in this region 1 2  There is a new small hypermetabolic lesion along the posterior T4 level in the spinal canal, SUV 3 5  CT images: Coronary artery calcifications  Mildly improved aeration of the right lung base  ABDOMEN:     No FDG avid soft tissue lesions are seen  CT images: Atherosclerotic aorta and branch vessels  Right kidney now appears malrotated       PELVIS:    Increased size and hypermetabolism of subcutaneous lesion overlying the left gluteal muscles, image 186, measuring 1 5 x 1 9 cm, SUV 24 1  Prior measurement 1 cm, SUV 12 5  Increased size and hypermetabolism of subcutaneous lesion along the medial right buttock region, image 231, measuring 1 9 x 1 6 cm, SUV 20 8  Previously this measured 0 9 x 0 67 m, SUV 2 6  Possible thickened bowel loop in the right pelvis, versus collapse, with associated FDG activity  CT images: Otherwise stable  OSSEOUS STRUCTURES:   Questionable slightly increased activity at the T9 level, SUV 2 3  This may be reassessed on follow-up exam    Erosion of the left 3rd rib at the costovertebral junction, and adjacent T3 transverse process, as above  Otherwise no FDG avid lesions are seen  CT images: Otherwise stable  IMPRESSION:   1  Interval progression of widespread metastases, in particular in the left neck, mediastinum, and left upper thoracic paraspinal region  Involvement of the thoracic spinal canal may be further evaluated with contrast MR    2  Right lower lung mass demonstrates persistent intense hypermetabolism, compatible with viable tumor  Mild hypermetabolism also remains in the right upper lung cavitary lesion  3  Increased mild asymmetric activity in the right thalamic region of uncertain significance  MR evaluation may also be considered  4  Possible thickened bowel loop in the right pelvis, versus collapse, with associated FDG activity  This may be reassessed on follow-up exam              Workstation performed: [id number]     Signed by:   Telma Kidd MD   11/22/17       Future Appointments    Date/Time Provider Specialty Site   11/30/2017 01:45 PM JONNIE Lopez , DO, [facility] Hematology Oncology [address] [place] [address]   12/13/2017 01:30 PM JONNIE Gordon  [address] [facility]     Signatures   Electronically signed by :  JONNIE Siegel ; Nov 30 2017  1:26PM EST (Author)

## 2017-12-05 NOTE — PROGRESS NOTES
Assessment    1  Bone metastases (198 5) (C79 51)   2  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 9) (C34 90)   3  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)    Plan  Metastatic squamous cell carcinoma to soft tissue    · Drink plenty of fluids ; Status:Complete;   Done: [id number]   Ordered; For:Metastatic squamous cell carcinoma to soft tissue; Ordered By:Mamie Cunha;   · Follow-up visit in 3 weeks Evaluation and Treatment  Follow-up  Status: Hold For -  Scheduling  Requested for: [id number]   Ordered; For: Metastatic squamous cell carcinoma to soft tissue; Ordered By: David Grimaldo Performed:  Due: 01PBV7117   · (1) CBC/PLT/DIFF; Status:Active; Requested for:30Nov2017;    Perform:[facility]; Due:30Nov2018; Ordered; For:Metastatic squamous cell carcinoma to soft tissue; Ordered By:Thierry Cunha;   · (1) CBC/PLT/DIFF; Status: In Progress - Order Generated; Requested for:Recurring  Schedule: 11/30/2017; 12/21/2017; 1/11/2018; 2/1/2018; 2/22/2018; 3/15/2018; 4/5/2018;  4/2   ; Perform:[facility]; Due:30Nov2018; Ordered; For:Metastatic squamous cell carcinoma to soft tissue; Ordered By:Mamie Cunha;   · (1) COMPREHENSIVE METABOLIC PANEL; Status:Active; Requested for:30Nov2017;    Perform:[facility]; Due:30Nov2018; Ordered; For:Metastatic squamous cell carcinoma to soft tissue; Ordered By:Mamie Cunha;   · (1) COMPREHENSIVE METABOLIC PANEL; Status: In Progress - Order Generated; Requested for:Recurring Schedule: 11/30/2017; 12/21/2017; 1/11/2018; 2/1/2018;  2/22/2018; 3/15/2018; 4/5/2018; 4/2   ; Perform:[facility]; Due:30Nov2018; Ordered; For:Metastatic squamous cell carcinoma to soft tissue; Ordered By:Mamie Cunha;   · (1) TSH; Status:Active; Requested for:30Nov2017;    Perform:[facility]; Due:30Nov2018; Ordered; For:Metastatic squamous cell carcinoma to soft tissue; Ordered By:Thierry Cunha;   · (1) TSH; Status: In Progress - Order Generated; Requested for:Recurring Schedule:  11/30/2017; 12/21/2017; 1/11/2018; 2/1/2018; 2/22/2018; 3/15/2018; 4/5/2018; 4/2   ; Perform:[facility]; Due:30Yxh1562; Ordered; For:Metastatic squamous cell carcinoma to soft tissue; Ordered By:Jose Cunha;    Discussion/Summary  Discussion Summary:   In summary, this is a 22-year-old female history of advanced squamous cell carcinoma of the lung, as outlined  Recent PET-CT shows evident progression of disease in cutaneous, soft tissue and bony metastases with increase in size and hypermetabolism  Some new lesions are noted  Clinically the patient is in fair condition, ECOG -2  Her main limitation relates to fatigue  Thrombocytosis and anemia are essentially stable and probably secondary to her cancer, reactive in nature  I reviewed the above findings and their significance with the patient and her   We reviewed that options to move forward would include a change in chemotherapy program, immunotherapy, supportive care without active anticancer treatment  We reviewed some issues related to immunotherapy drugs which are available, practical issues, scheduling, pre treatment requirements for testing, etc   Based on these considerations we have elected to proceed with Tecentriq 1200 mg IV Q 3 weeks  We reviewed potential toxicities including but not limited to diarrhea, hypothyroidism, eczematoid rash  Many patients tolerate this medication with minimal if any toxicity  Other autoimmune processes certainly could occur, however  Toxicities are generally considered to be reversible with or without steroid medication  Our chemotherapy nurse review the above information was well as providing written information is regard  We reviewed efficacy data including response rate, stable disease rate, disease control duration, etc   The patient and her  voiced understanding and agreement with the above plan will be proceeding as outlined  Counseling Documentation With Imm: The patient was counseled regarding diagnostic results, instructions for management, patient and family education, impressions  total time of encounter was 40 minutes  Chief Complaint  Chief Complaint Free Text Note Form: Follow-up regarding lung cancer  History of Present Illness  HPI: June 2017-patient presented to the emergency room with lower extremity edema and redness  Additionally, she was found to have a right pleural effusion  CT chest, abdomen, pelvis showed right hilar mass, 3 2 cm mediastinal lymph nodes up to 15 mm, subcarinal 17 mm  Indeterminate left adrenal nodule 13 mm, moderate to large right pleural effusion  Moderate to large abdominal ascites  No bone lesions noted  June 7, 2017- bronchoscopy showed a right bronchus lesion  Biopsy was consistent with a non-small cell lung carcinoma, favor squamous cell carcinoma  June 26, 2017-hypermetabolic right lung mass, 5 1 cm, SUV 23 2 with central necrosis  Right hilar adenopathy extending the subcarina, SUV 28 7  Right upper lobe cavitary lesion, 2 1 cm, SUV 1 7  Right 10th rib and T1 spinous processes with hypermetabolic lesions  Left adrenal mass, 2 3 cm, SUV 18 8  July 27th, 2017 started Abraxane 80 milligrams/meter squared day 1, 8, 15, carbo AUC -5, day 1 only, Q 21 days  Current Therapy: July 27th, 2017 started Abraxane 80 milligrams/meter squared day 1, 8, 15, carbo AUC -5, day 1 only, Q 21 days  Interval History: Pain in the tongue  mass detected there  To be removed in the near future  Left scapular pain, better with Tramadol 0-1 per day         Review of Systems  Complete-Female:   Constitutional: recent 15 lbs in past few months  lb weight loss, but No fever, no chills, feels well, no tiredness, no recent weight gain or weight loss  Eyes: No complaints of eye pain, no red eyes, no eyesight problems, no discharge, no dry eyes, no itching of eyes     ENT: no complaints of earache, no loss of hearing, no nose bleeds, no nasal discharge, no sore throat, no hoarseness  Cardiovascular: No complaints of slow heart rate, no fast heart rate, no chest pain, no palpitations, no leg claudication, no lower extremity edema  Respiratory: No complaints of shortness of breath, no wheezing, no cough, no SOB on exertion, no orthopnea, no PND  Gastrointestinal: No complaints of abdominal pain, no constipation, no nausea or vomiting, no diarrhea, no bloody stools  Genitourinary: No complaints of dysuria, no incontinence, no pelvic pain, no dysmenorrhea, no vaginal discharge or bleeding  Musculoskeletal: No complaints of arthralgias, no myalgias, no joint swelling or stiffness, no limb pain or swelling  Integumentary: No complaints of skin rash or lesions, no itching, no skin wounds, no breast pain or lump  Neurological: No complaints of headache, no confusion, no convulsions, no numbness, no dizziness or fainting, no tingling, no limb weakness, no difficulty walking  Psychiatric: Not suicidal, no sleep disturbance, no anxiety or depression, no change in personality, no emotional problems  Endocrine: No complaints of proptosis, no hot flashes, no muscle weakness, no deepening of the voice, no feelings of weakness  Hematologic/Lymphatic: No complaints of swollen glands, no swollen glands in the neck, does not bleed easily, does not bruise easily  Active Problems    1  Anxiety (300 00) (F41 9)   2  Bone metastases (198 5) (C79 51)   3  History of leukocytosis (V12 3) (Z86 2)   4  Hypertension (401 9) (I10)   5  Hyponatremia (276 1) (E87 1)   6  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)   7  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)   8  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 9) (C34 91)   9  Pain, joint, shoulder (719 41) (M25 519)   10  Pleural effusion (511 9) (J90)   11  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 9) (C34 90)   12   Subcutaneous nodule (782 2) (R22 9)   13  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)    Past Medical History    1  History of Cellulitis of both feet (682 7) (L03 115,L03 116)   2  History of leukocytosis (V12 3) (Z86 2)   3  History of Lower leg edema (782 3) (R60 0)    Surgical History    1  History of Appendectomy   2  History of Gynecologic Services Insertion Of Pessary Ring   3  History of Tonsillectomy    Family History  Mother    1  Family history of dementia (V17 2) (Z81 8)  Father    2  Family history of cerebrovascular accident (CVA) (V17 1) (Z82 3)    Social History    · Employed   · Former smoker (D55 10) (C14 539)   ·     Current Meds   1  Abraxane 100 MG Intravenous Suspension Reconstituted; Therapy: 83Vte2697 to Recorded   2  ALPRAZolam 0 25 MG Oral Tablet; take 1 tablet at bedtime as needed; Therapy: 44HEN7342 to (Last Rx:05Oct2017) Ordered   3  CARBOplatin 150 MG/15ML Intravenous Solution; Therapy: 02Cdn0537 to Recorded   4  Fiber TABS; Therapy: (Recorded:05Sep2017) to Recorded   5  Metoprolol Succinate ER 25 MG Oral Tablet Extended Release 24 Hour; take 0 5 tablet   daily; Therapy: 14VOX4369 to (SQPPKAXH:87KSF9671)  Requested for: 09LDN5807; Last   Rx:02Nov2017 Ordered   6  TraMADol HCl - 50 MG Oral Tablet; TAKE 1 TABLET EVERY 6 HOURS AS NEEDED FOR   BREAKTHROUGH PAIN;   Therapy: 82DIW0521 to (Evaluate:05Nov2017); Last Rx:06Oct2017 Ordered   7  Zolpidem Tartrate 5 MG Oral Tablet; Take 1 or 2 at bedtime for sleep; Therapy: 11RLL4776 to (RPLSBSPZ:87ZTW6258); Last Rx:25Oct2017 Ordered    Allergies    1  No Known Drug Allergies    Vitals  Vital Signs    Recorded: 72MCN4706 02:22PM   Temperature 98 7 F   Heart Rate 95   Respiration 18   Systolic 965   Diastolic 68   Height 5 ft 3 in   Weight 93 lb 4 oz   BMI Calculated 16 52   BSA Calculated 1 4   O2 Saturation 97   Pain Scale 8     Physical Exam    Constitutional   General appearance: No acute distress, well appearing and well nourished      Eyes   Conjunctiva and lids: No swelling, erythema or discharge  Ears, Nose, Mouth, and Throat   External inspection of ears and nose: Normal     Oropharynx: Normal with no erythema, edema, exudate or lesions  Pulmonary   Auscultation of lungs: Abnormal   Decreased breath sounds throughout with dullness to percussion at the right lung base  Cardiovascular   Auscultation of heart: Normal rate and rhythm, normal S1 and S2, without murmurs  Examination of extremities for edema and/or varicosities: Normal     Abdomen   Abdomen: Non-tender, no masses  Liver and spleen: No hepatomegaly or splenomegaly  Lymphatic   Palpation of lymph nodes in neck: No lymphadenopathy  Musculoskeletal   Gait and station: Normal     Skin   Skin and subcutaneous tissue: Normal without rashes or lesions  Neurologic   Cranial nerves: Cranial nerves 2-12 intact  Psychiatric   Orientation to person, place, and time: Normal          Results/Data  (1) CBC/PLT/DIFF [id number] 09:06AM Dianelys Arrow     Test Name Result Flag Reference   WBC COUNT 5 90 Thousand/uL  4 31-10 16   RBC COUNT 3 00 Million/uL L 3 81-5 12   HEMOGLOBIN 9 6 g/dL L 11 5-15 4   HEMATOCRIT 29 8 % L 34 8-46  1   MCV 99 fL H 82-98   MCH 32 0 pg  26 8-34 3   MCHC 32 2 g/dL  31 4-37 4   RDW 15 4 % H 11 6-15 1   MPV 9 1 fL  8 9-12 7   PLATELET COUNT 947 Thousands/uL H 149-390   nRBC AUTOMATED 0 /100 WBCs     NEUTROPHILS RELATIVE PERCENT 58 %  43-75   LYMPHOCYTES RELATIVE PERCENT 15 %  14-44   MONOCYTES RELATIVE PERCENT 25 % H 4-12   EOSINOPHILS RELATIVE PERCENT 1 %  0-6   BASOPHILS RELATIVE PERCENT 1 %  0-1   NEUTROPHILS ABSOLUTE COUNT 3 44 Thousands/? ??L  1 85-7 62   LYMPHOCYTES ABSOLUTE COUNT 0 91 Thousands/? ??L  0 60-4 47   MONOCYTES ABSOLUTE COUNT 1 45 Thousand/? ??L H 0 17-1 22   EOSINOPHILS ABSOLUTE COUNT 0 04 Thousand/? ??L  0 00-0 61   BASOPHILS ABSOLUTE COUNT 0 03 Thousands/? ??L  0 00-0 10   This is a patient instruction: This test is non-fasting   Please drink two glasses of water morning of bloodwork  (1) CBC/PLT/DIFF [id number] 09:06AM [facility]     Test Name Result Flag Reference   WBC COUNT 5 90 Thousand/uL  4 31-10 16   RBC COUNT 3 00 Million/uL L 3 81-5 12   HEMOGLOBIN 9 6 g/dL L 11 5-15 4   HEMATOCRIT 29 8 % L 34 8-46  1   MCV 99 fL H 82-98   MCH 32 0 pg  26 8-34 3   MCHC 32 2 g/dL  31 4-37 4   RDW 15 4 % H 11 6-15 1   MPV 9 1 fL  8 9-12 7   PLATELET COUNT 900 Thousands/uL H 149-390   nRBC AUTOMATED 0 /100 WBCs     NEUTROPHILS RELATIVE PERCENT 58 %  43-75   LYMPHOCYTES RELATIVE PERCENT 15 %  14-44   MONOCYTES RELATIVE PERCENT 25 % H 4-12   EOSINOPHILS RELATIVE PERCENT 1 %  0-6   BASOPHILS RELATIVE PERCENT 1 %  0-1   NEUTROPHILS ABSOLUTE COUNT 3 44 Thousands/? ??L  1 85-7 62   LYMPHOCYTES ABSOLUTE COUNT 0 91 Thousands/? ??L  0 60-4 47   MONOCYTES ABSOLUTE COUNT 1 45 Thousand/? ??L H 0 17-1 22   EOSINOPHILS ABSOLUTE COUNT 0 04 Thousand/? ??L  0 00-0 61   BASOPHILS ABSOLUTE COUNT 0 03 Thousands/? ??L  0 00-0 10   This is a patient instruction: This test is non-fasting  Please drink two glasses of water morning of bloodwork  (1) COMPREHENSIVE METABOLIC PANEL [id number] 47:60TL Mohit Brown     Test Name Result Flag Reference   SODIUM 132 mmol/L L 136-145   POTASSIUM 3 8 mmol/L  3 5-5 3   CHLORIDE 95 mmol/L L 100-108   CARBON DIOXIDE 29 mmol/L  21-32   ANION GAP (CALC) 8 mmol/L  4-13   BLOOD UREA NITROGEN 7 mg/dL  5-25   CREATININE 0 41 mg/dL L 0 60-1 30   Standardized to IDMS reference method   CALCIUM 9 4 mg/dL  8 3-10 1   BILI, TOTAL 0 52 mg/dL  0 20-1 00   ALK PHOSPHATAS 83 U/L     ALT (SGPT) 17 U/L  12-78   Specimen collection should occur prior to Sulfasalazine and/or Sulfapyridine administration due to the potential for falsely depressed results  AST(SGOT) 13 U/L  5-45   Specimen collection should occur prior to Sulfasalazine administration due to the potential for falsely depressed results     ALBUMIN 2 9 g/dL L 3 5-5 0   TOTAL PROTEIN 7 4 g/dL  6 4-8 2   eGFR 101 ml/min/1 73sq m     [facility] Disease Education Program recommendations are as follows:  GFR calculation is accurate only with a steady state creatinine  Chronic Kidney disease less than 60 ml/min/1 73 sq  meters  Kidney failure less than 15 ml/min/1 73 sq  meters  GLUCOSE FASTING 103 mg/dL H 65-99   Specimen collection should occur prior to Sulfasalazine administration due to the potential for falsely depressed results  Specimen collection should occur prior to Sulfapyridine administration due to the potential for falsely elevated results  * NM PET CT SKULL BASE TO MID THIGH 22Nov2017 07:41AM Corapeakenirav Garvey Order Number: [id number]    - Patient Instructions: [address]     Test Name Result Flag Reference   NM PET CT SKULL BASE TO MID THIGH (Report)     PET/CT SCAN     INDICATION: C34 91: Malignant neoplasm of unspecified part of right bronchus or lung  History taken directly from the electronic ordering system  Right tongue and left gluteal metastases, restaging post chemotherapy for treatment management     MODIFIER:   PS      COMPARISON: 9/26/2017 and priors     CELL TYPE: Squamous cell carcinoma, right bronchus biopsy 6/7/2017     TECHNIQUE:  7 9 mCi F-18-FDG administered IV  Multiplanar attenuation corrected and non attenuation corrected PET images are available for interpretation, and contiguous, low dose, axial CT sections were obtained from the skull base through the femurs    following the administration of oral contrast material (7 5 cc Omnipaque-240 in 300 cc water)  Intravenous contrast material was not utilized  This examination, like all CT scans performed in the [facility], was performed utilizing    techniques to minimize radiation dose exposure, including the use of iterative reconstruction and automated exposure control       Fasting serum glucose: 89 mg/dl     FINDINGS:      VISUALIZED BRAIN:     There is new asymmetric activity in the right thalamic region, SUV measuring 7 3  Corresponding left flank region has an SUV of 5 3  Significance is uncertain at this time  HEAD/NECK:     Persistent focal hypermetabolism in the right oral cavity/tongue region, SUV measuring 5 7, prior SUV 11 2  Significant increase in size and hypermetabolism of the left neck mass centered at the level of the left thyroid , SUV measuring 23 8, prior SUV 19 8  This mass measures approximately 4 5 x 4 3 cm in axial dimensions on series 3 image 66  There are    also multiple new hypermetabolic metastatic nodes in the left neck from the level of the left submandibular gland to the left supraclavicular region  Adenopathy also extends into the mediastinum and left paraspinal region  There is also a new    hypermetabolic lesion in the right posterior paraspinal musculature at the cervicothoracic junction, SUV 21 4  CT images: Trachea is deviated to the right due to mass effect from the adjacent left neck mass  CHEST:  Mildly increased hypermetabolism of large right lower lung mass, compatible with viable tumor  This measures 4 3 x 3 6 cm, SUV 19  Prior measurement 4 9 x 3 3 cm, SUV 16 6  Stable mild FDG activity associated with the right upper cavitary lung lesion with mural nodularity, SUV 0 7, prior SUV 0 7  Significant increased size and hypermetabolism of left upper medial pleural-based mass, SUV measuring 17, prior SUV 18 5  On series 3 image 73, this mass measures approximately 4 7 x 2 9 cm  Prior measurement 2 4 x 1 2 cm  There is associated erosion of   the medial 3rd rib and adjacent T3 transverse process  There also appears to be extension through the neural foramen to the spinal canal at this level  Infiltration into the left upper lung parenchyma is also noted  Significant enlargement of subcarinal adenopathy, measuring approximately 3 8 x 3 7 cm, SUV 30 3  Prior measurement 1 8 x 1 4 cm, SUV 24 3       Increased right hilar metabolic adenopathy, SUV 16  Prior SUV in this region 3 4  Enlargement of subcutaneous metastatic nodule along the posterior right back series 3 image 93 measuring 1 3 x 1 1 cm, SUV 16 9  Prior SUV in this region 1 2  There is a new small hypermetabolic lesion along the posterior T4 level in the spinal canal, SUV 3 5  CT images: Coronary artery calcifications  Mildly improved aeration of the right lung base  ABDOMEN:     No FDG avid soft tissue lesions are seen  CT images: Atherosclerotic aorta and branch vessels  Right kidney now appears malrotated  PELVIS:    Increased size and hypermetabolism of subcutaneous lesion overlying the left gluteal muscles, image 186, measuring 1 5 x 1 9 cm, SUV 24 1  Prior measurement 1 cm, SUV 12 5  Increased size and hypermetabolism of subcutaneous lesion along the medial right buttock region, image 231, measuring 1 9 x 1 6 cm, SUV 20 8  Previously this measured 0 9 x 0 67 m, SUV 2 6  Possible thickened bowel loop in the right pelvis, versus collapse, with associated FDG activity  CT images: Otherwise stable  OSSEOUS STRUCTURES:   Questionable slightly increased activity at the T9 level, SUV 2 3  This may be reassessed on follow-up exam    Erosion of the left 3rd rib at the costovertebral junction, and adjacent T3 transverse process, as above  Otherwise no FDG avid lesions are seen  CT images: Otherwise stable  IMPRESSION:   1  Interval progression of widespread metastases, in particular in the left neck, mediastinum, and left upper thoracic paraspinal region  Involvement of the thoracic spinal canal may be further evaluated with contrast MR    2  Right lower lung mass demonstrates persistent intense hypermetabolism, compatible with viable tumor  Mild hypermetabolism also remains in the right upper lung cavitary lesion  3  Increased mild asymmetric activity in the right thalamic region of uncertain significance   MR evaluation may also be considered  4  Possible thickened bowel loop in the right pelvis, versus collapse, with associated FDG activity  This may be reassessed on follow-up exam              Workstation performed: [id number]     Signed by:   Colby Navas MD   11/22/17     Future Appointments    Date/Time Provider Specialty Site   12/13/2017 01:30 PM JONNIE Garcia   [facility]     Signatures   Electronically signed by : JONNIE Edwards DOM D ,DO; Nov 30 2017  2:58PM EST                       (Author)

## 2017-12-06 ENCOUNTER — HOSPITAL ENCOUNTER (OUTPATIENT)
Dept: INFUSION CENTER | Facility: CLINIC | Age: 77
Discharge: HOME | End: 2017-12-06
Payer: COMMERCIAL

## 2017-12-06 VITALS
TEMPERATURE: 98.1 F | HEIGHT: 64 IN | HEART RATE: 91 BPM | SYSTOLIC BLOOD PRESSURE: 162 MMHG | WEIGHT: 91.93 LBS | RESPIRATION RATE: 16 BRPM | BODY MASS INDEX: 15.69 KG/M2 | DIASTOLIC BLOOD PRESSURE: 76 MMHG

## 2017-12-06 PROCEDURE — C9483 INJECTION, ATEZOLIZUMAB: HCPCS | Performed by: INTERNAL MEDICINE

## 2017-12-06 PROCEDURE — 96413 CHEMO IV INFUSION 1 HR: CPT

## 2017-12-06 RX ADMIN — SODIUM CHLORIDE 20 ML/HR: 0.9 INJECTION, SOLUTION INTRAVENOUS at 08:45

## 2017-12-06 RX ADMIN — ATEZOLIZUMAB 1200 MG: 1200 INJECTION, SOLUTION INTRAVENOUS at 09:21

## 2017-12-06 NOTE — PLAN OF CARE
Problem: PAIN - ADULT  Goal: Verbalizes/displays adequate comfort level or baseline comfort level  Interventions:  - Encourage patient to monitor pain and request assistance  - Assess pain using appropriate pain scale  - Administer analgesics based on type and severity of pain and evaluate response  - Implement non-pharmacological measures as appropriate and evaluate response  - Consider cultural and social influences on pain and pain management  - Notify physician/advanced practitioner if interventions unsuccessful or patient reports new pain  Outcome: Progressing      Problem: INFECTION - ADULT  Goal: Absence or prevention of progression during hospitalization  INTERVENTIONS:  - Assess and monitor for signs and symptoms of infection  - Monitor lab/diagnostic results  - Monitor all insertion sites, i e  indwelling lines, tubes, and drains  - Monitor endotracheal (as able) and nasal secretions for changes in amount and color  - [place] appropriate cooling/warming therapies per order  - Administer medications as ordered  - Instruct and encourage patient and family to use good hand hygiene technique  - Identify and instruct in appropriate isolation precautions for identified infection/condition  Outcome: Progressing    Goal: Absence of fever/infection during neutropenic period  INTERVENTIONS:  - Monitor WBC  - Implement neutropenic guidelines  Outcome: Progressing      Problem: Knowledge Deficit  Goal: Patient/family/caregiver demonstrates understanding of disease process, treatment plan, medications, and discharge instructions  Complete learning assessment and assess knowledge base    Interventions:  - Provide teaching at level of understanding  - Provide teaching via preferred learning methods  Outcome: Progressing

## 2017-12-06 NOTE — PROGRESS NOTES
Pt  Verbalizes pain 6/10 in right side of upper back  Lump noted  Palpable lump felt in upper back area new since last appointment with Dr Kathia Jean  Spoke with Nathalie Severe RN reporting assessment  Crystal will notify Dr Kathia Jean and also requested pt  To call with increased pain and if other lumps appear  Pt  And spouse verbalized understanding  Labs reviewed and also reported decreasing Na  129  Will proceed with ordered  Tecentriq infusion

## 2017-12-21 ENCOUNTER — ALLSCRIPTS OFFICE VISIT (OUTPATIENT)
Dept: OTHER | Facility: OTHER | Age: 77
End: 2017-12-21

## 2017-12-21 ENCOUNTER — GENERIC CONVERSION - ENCOUNTER (OUTPATIENT)
Dept: FAMILY MEDICINE CLINIC | Facility: CLINIC | Age: 77
End: 2017-12-21

## 2017-12-26 ENCOUNTER — APPOINTMENT (OUTPATIENT)
Dept: LAB | Facility: CLINIC | Age: 77
End: 2017-12-26
Payer: COMMERCIAL

## 2017-12-26 ENCOUNTER — TRANSCRIBE ORDERS (OUTPATIENT)
Dept: LAB | Facility: CLINIC | Age: 77
End: 2017-12-26

## 2017-12-26 DIAGNOSIS — C79.89 SECONDARY MALIGNANT NEOPLASM OF OTHER SPECIFIED SITES (CODE): ICD-10-CM

## 2017-12-26 LAB
ALBUMIN SERPL BCP-MCNC: 2.5 G/DL (ref 3.5–5)
ALP SERPL-CCNC: 94 U/L (ref 46–116)
ALT SERPL W P-5'-P-CCNC: 17 U/L (ref 12–78)
ANION GAP SERPL CALCULATED.3IONS-SCNC: 9 MMOL/L (ref 4–13)
AST SERPL W P-5'-P-CCNC: 14 U/L (ref 5–45)
BASOPHILS # BLD AUTO: 0.05 THOUSANDS/ΜL (ref 0–0.1)
BASOPHILS NFR BLD AUTO: 1 % (ref 0–1)
BILIRUB SERPL-MCNC: 0.47 MG/DL (ref 0.2–1)
BUN SERPL-MCNC: 7 MG/DL (ref 5–25)
CALCIUM SERPL-MCNC: 9.3 MG/DL (ref 8.3–10.1)
CHLORIDE SERPL-SCNC: 91 MMOL/L (ref 100–108)
CO2 SERPL-SCNC: 30 MMOL/L (ref 21–32)
CREAT SERPL-MCNC: 0.5 MG/DL (ref 0.6–1.3)
EOSINOPHIL # BLD AUTO: 0.15 THOUSAND/ΜL (ref 0–0.61)
EOSINOPHIL NFR BLD AUTO: 2 % (ref 0–6)
ERYTHROCYTE [DISTWIDTH] IN BLOOD BY AUTOMATED COUNT: 15.4 % (ref 11.6–15.1)
GFR SERPL CREATININE-BSD FRML MDRD: 95 ML/MIN/1.73SQ M
GLUCOSE P FAST SERPL-MCNC: 139 MG/DL (ref 65–99)
HCT VFR BLD AUTO: 30.4 % (ref 34.8–46.1)
HGB BLD-MCNC: 10.1 G/DL (ref 11.5–15.4)
LYMPHOCYTES # BLD AUTO: 0.99 THOUSANDS/ΜL (ref 0.6–4.47)
LYMPHOCYTES NFR BLD AUTO: 14 % (ref 14–44)
MCH RBC QN AUTO: 31.6 PG (ref 26.8–34.3)
MCHC RBC AUTO-ENTMCNC: 33.2 G/DL (ref 31.4–37.4)
MCV RBC AUTO: 95 FL (ref 82–98)
MONOCYTES # BLD AUTO: 1.27 THOUSAND/ΜL (ref 0.17–1.22)
MONOCYTES NFR BLD AUTO: 17 % (ref 4–12)
NEUTROPHILS # BLD AUTO: 4.84 THOUSANDS/ΜL (ref 1.85–7.62)
NEUTS SEG NFR BLD AUTO: 66 % (ref 43–75)
NRBC BLD AUTO-RTO: 0 /100 WBCS
PLATELET # BLD AUTO: 520 THOUSANDS/UL (ref 149–390)
PMV BLD AUTO: 8.7 FL (ref 8.9–12.7)
POTASSIUM SERPL-SCNC: 3.3 MMOL/L (ref 3.5–5.3)
PROT SERPL-MCNC: 7.9 G/DL (ref 6.4–8.2)
RBC # BLD AUTO: 3.2 MILLION/UL (ref 3.81–5.12)
SODIUM SERPL-SCNC: 130 MMOL/L (ref 136–145)
TSH SERPL DL<=0.05 MIU/L-ACNC: 1.01 UIU/ML (ref 0.36–3.74)
WBC # BLD AUTO: 7.32 THOUSAND/UL (ref 4.31–10.16)

## 2017-12-26 PROCEDURE — 84443 ASSAY THYROID STIM HORMONE: CPT

## 2017-12-26 PROCEDURE — 80053 COMPREHEN METABOLIC PANEL: CPT

## 2017-12-26 PROCEDURE — 85025 COMPLETE CBC W/AUTO DIFF WBC: CPT

## 2017-12-26 PROCEDURE — 36415 COLL VENOUS BLD VENIPUNCTURE: CPT

## 2017-12-26 RX ORDER — SODIUM CHLORIDE 9 MG/ML
20 INJECTION, SOLUTION INTRAVENOUS CONTINUOUS
Status: DISCONTINUED | OUTPATIENT
Start: 2017-12-27 | End: 2017-12-30 | Stop reason: HOSPADM

## 2017-12-27 ENCOUNTER — HOSPITAL ENCOUNTER (OUTPATIENT)
Dept: INFUSION CENTER | Facility: CLINIC | Age: 77
Discharge: HOME | End: 2017-12-29
Payer: COMMERCIAL

## 2017-12-27 VITALS
SYSTOLIC BLOOD PRESSURE: 144 MMHG | BODY MASS INDEX: 16.02 KG/M2 | HEART RATE: 88 BPM | HEIGHT: 63 IN | TEMPERATURE: 98.8 F | DIASTOLIC BLOOD PRESSURE: 74 MMHG | OXYGEN SATURATION: 99 % | WEIGHT: 90.39 LBS | RESPIRATION RATE: 18 BRPM

## 2017-12-27 PROCEDURE — 96375 TX/PRO/DX INJ NEW DRUG ADDON: CPT

## 2017-12-27 PROCEDURE — C9483 INJECTION, ATEZOLIZUMAB: HCPCS | Performed by: INTERNAL MEDICINE

## 2017-12-27 PROCEDURE — 96413 CHEMO IV INFUSION 1 HR: CPT

## 2017-12-27 RX ADMIN — HYDROCORTISONE SODIUM SUCCINATE 50 MG: 100 INJECTION, POWDER, FOR SOLUTION INTRAMUSCULAR; INTRAVENOUS at 10:56

## 2017-12-27 RX ADMIN — SODIUM CHLORIDE 20 ML/HR: 0.9 INJECTION, SOLUTION INTRAVENOUS at 09:55

## 2017-12-27 RX ADMIN — ATEZOLIZUMAB 1200 MG: 1200 INJECTION, SOLUTION INTRAVENOUS at 10:34

## 2017-12-27 RX ADMIN — FAMOTIDINE 20 MG: 10 INJECTION, SOLUTION INTRAVENOUS at 10:58

## 2017-12-27 NOTE — PROGRESS NOTES
Patient started on tecentriq over 30 minutes today  Patient received 10minutes of infusion then complained of back pain at 1050  Infusion stopped  VSS for patient  Patient without any other symptoms  Hydrocortisone 50mg given  Pepcid 20mg given with positive results  Sherie at [facility] office made aware  After 30 minutes, infusion restarted to run over hour as per order  Will continue to monitor  Crystal at [facility] aware of patient's potassium level-3 3, creatinine-0 50

## 2017-12-27 NOTE — PLAN OF CARE
Problem: Potential for Falls  Goal: Patient will remain free of falls  INTERVENTIONS:  - Assess patient frequently for physical needs  -  Identify cognitive and physical deficits and behaviors that affect risk of falls    -  [place] fall precautions as indicated by assessment   - Educate patient/family on patient safety including physical limitations  - Instruct patient to call for assistance with activity based on assessment  - Modify environment to reduce risk of injury  - Consider OT/PT consult to assist with strengthening/mobility   Outcome: Progressing

## 2018-01-10 ENCOUNTER — TRANSCRIBE ORDERS (OUTPATIENT)
Dept: ADMINISTRATIVE | Facility: HOSPITAL | Age: 78
End: 2018-01-10

## 2018-01-10 ENCOUNTER — GENERIC CONVERSION - ENCOUNTER (OUTPATIENT)
Dept: OTHER | Facility: OTHER | Age: 78
End: 2018-01-10

## 2018-01-10 DIAGNOSIS — C34.90 MALIGNANT NEOPLASM OF LUNG, UNSPECIFIED LATERALITY, UNSPECIFIED PART OF LUNG (HCC): Primary | ICD-10-CM

## 2018-01-10 NOTE — MISCELLANEOUS
Message   Recorded as Task   Date: 10/06/2017 09:16 AM, Created By: Aure Levy   Task Name: Med Renewal Request   Assigned To: Sha Abel   Regarding Patient: Julian Lara, Status: Active   CommentShefali De La Cruz - 06 Oct 2017 9:16 AM     TASK CREATED  Caller: Mayte Roles; Renew Medication; [phone]  stated that the pt is having pain on her back shoulder blade and the tylenol is not working  If they can have a prescription, they would like it filled in [facility]  Stated that he will call back with the phone number & a fax for a pharmacy down there  Pt's phone # is [phone]   Aure Levy - 06 Oct 2017 9:21 AM     TASK REASSIGNED: Previously Assigned To Sherie Scott  [address]  Phone [phone]  Fax# [phone]   Script for Tramadol called to pharmacy     Discussed with Amilcar Rivero  Active Problems    1  Anxiety (300 00) (F41 9)   2  Bone metastases (198 5) (C79 51)   3  Elevated blood pressure reading (796 2) (R03 0)   4  History of leukocytosis (V12 3) (Z86 2)   5  Hyponatremia (276 1) (E87 1)   6  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)   7  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 9) (C34 91)   8  Pain, joint, shoulder (719 41) (M25 519)   9  Pleural effusion (511 9) (J90)   10  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 9) (C34 90)   11  Subcutaneous nodule (782 2) (R22 9)   12  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)    Current Meds   1  Abraxane 100 MG Intravenous Suspension Reconstituted; Therapy: 03Czs9456 to Recorded   2  ALPRAZolam 0 25 MG Oral Tablet; take 1 tablet at bedtime as needed; Therapy: 90SQW4245 to (Last Rx:05Oct2017); Status: ACTIVE - Retrospective By Protocol   Authorization Ordered   3  CARBOplatin 150 MG/15ML Intravenous Solution; Therapy: 41Fey6009 to Recorded   4  Fiber TABS; Therapy: (Recorded:58Zmk7721) to Recorded   5   TraMADol HCl - 50 MG Oral Tablet; TAKE 1 TABLET EVERY 6 HOURS AS NEEDED FOR   BREAKTHROUGH PAIN;   Therapy: 07QYG9336 to (Evaluate:05Nov2017); Last Rx:06Oct2017; Status: ACTIVE -   Retrospective By Protocol Authorization Ordered   6  Zolpidem Tartrate 5 MG Oral Tablet (Ambien); Take 1 or 2 at bedtime for sleep; Therapy: 21TOF5685 to (Evaluate:11Aug2017); Last Rx:82Wzb4799 Ordered    Allergies    1   No Known Drug Allergies    Signatures   Electronically signed by : Isabelle Horton, ; Oct  6 2017 10:24AM EST                       (Author)

## 2018-01-11 DIAGNOSIS — C79.89 SECONDARY MALIGNANT NEOPLASM OF OTHER SPECIFIED SITES (CODE): ICD-10-CM

## 2018-01-12 VITALS
RESPIRATION RATE: 16 BRPM | BODY MASS INDEX: 17.19 KG/M2 | TEMPERATURE: 98.4 F | HEART RATE: 88 BPM | WEIGHT: 97 LBS | DIASTOLIC BLOOD PRESSURE: 60 MMHG | OXYGEN SATURATION: 90 % | HEIGHT: 63 IN | SYSTOLIC BLOOD PRESSURE: 100 MMHG

## 2018-01-12 VITALS
TEMPERATURE: 99.1 F | BODY MASS INDEX: 17.05 KG/M2 | DIASTOLIC BLOOD PRESSURE: 78 MMHG | HEIGHT: 63 IN | RESPIRATION RATE: 15 BRPM | HEART RATE: 104 BPM | WEIGHT: 96.25 LBS | SYSTOLIC BLOOD PRESSURE: 144 MMHG | OXYGEN SATURATION: 95 %

## 2018-01-13 VITALS
DIASTOLIC BLOOD PRESSURE: 66 MMHG | OXYGEN SATURATION: 95 % | RESPIRATION RATE: 16 BRPM | HEIGHT: 63 IN | BODY MASS INDEX: 16.34 KG/M2 | WEIGHT: 92.25 LBS | HEART RATE: 103 BPM | SYSTOLIC BLOOD PRESSURE: 138 MMHG | TEMPERATURE: 99.5 F

## 2018-01-13 VITALS
TEMPERATURE: 97.5 F | WEIGHT: 94.38 LBS | DIASTOLIC BLOOD PRESSURE: 64 MMHG | HEART RATE: 106 BPM | HEIGHT: 63 IN | RESPIRATION RATE: 15 BRPM | OXYGEN SATURATION: 96 % | SYSTOLIC BLOOD PRESSURE: 122 MMHG | BODY MASS INDEX: 16.72 KG/M2

## 2018-01-13 VITALS
BODY MASS INDEX: 17.05 KG/M2 | RESPIRATION RATE: 16 BRPM | HEART RATE: 100 BPM | DIASTOLIC BLOOD PRESSURE: 60 MMHG | SYSTOLIC BLOOD PRESSURE: 116 MMHG | OXYGEN SATURATION: 98 % | TEMPERATURE: 99.4 F | WEIGHT: 96.25 LBS

## 2018-01-13 VITALS
SYSTOLIC BLOOD PRESSURE: 132 MMHG | WEIGHT: 96 LBS | HEIGHT: 63 IN | HEART RATE: 100 BPM | DIASTOLIC BLOOD PRESSURE: 68 MMHG | BODY MASS INDEX: 17.01 KG/M2 | RESPIRATION RATE: 16 BRPM

## 2018-01-13 VITALS
HEART RATE: 95 BPM | SYSTOLIC BLOOD PRESSURE: 118 MMHG | DIASTOLIC BLOOD PRESSURE: 68 MMHG | OXYGEN SATURATION: 97 % | RESPIRATION RATE: 18 BRPM | TEMPERATURE: 98.7 F | BODY MASS INDEX: 16.52 KG/M2 | HEIGHT: 63 IN | WEIGHT: 93.25 LBS

## 2018-01-13 VITALS
WEIGHT: 93.8 LBS | SYSTOLIC BLOOD PRESSURE: 110 MMHG | BODY MASS INDEX: 16.62 KG/M2 | HEIGHT: 63 IN | DIASTOLIC BLOOD PRESSURE: 60 MMHG | HEART RATE: 96 BPM

## 2018-01-13 NOTE — PROGRESS NOTES
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)    Plan   Pain, joint, shoulder    · TraMADol HCl - 50 MG Oral Tablet; TAKE 1 TABLET EVERY 6 HOURS AS    NEEDED FOR BREAKTHROUGH PAIN   Rx By: Isa Gloria; Dispense: 15 Days ; #:60 Tablet; Refill: 1;For: Pain, joint, shoulder; CAROLINE = N; Record; Last Updated By: Jaspreet Alonso; 12/21/2017 8:46:49 AM  Tongue malignant neoplasm    · Follow-up visit in 3 weeks Evaluation and Treatment  Follow-up  Status: Complete  Done:    52MNL7011 08:30AM   Ordered; For: Tongue malignant neoplasm; Ordered By: Indu Salas Performed:  Due: 39PRF1017; Last Updated By: Marnie Bass; 12/21/2017 9:30:00 AM    Discussion/Summary   Discussion Summary:    In summary, this is a 77-year-old female history of advanced squamous cell carcinoma of the lung, as outlined  of disease noted in November 2017  She was started on Tecentriq  She has had one cycle and tolerated without difficulty  still shows hemoglobin 9 8, platelets 085  This remains stable  Likely secondary to chemotherapy and would be expected to improve in the future, since chemotherapy has been discontinued and immunotherapy being given at this time  did notice a lump on her back, the day following her last office visit  This is on the mid thoracic region, 2 cm x 2 cm  Soft, mobile  Mild erythema on the surface, consistent with irritation of the skin from rubbing while sitting/laying  She describes some pain when laying  Advised the use of oral analgesia as she has not done so already  Also discussed this is likely cutaneous metastasis, as she has had this prior  At this point further investigation of this is not indicated  does not want anything to help with appetite    to proceed with cycle 2     and  voiced understanding and agreement in the discussion  Aware to call with questions/symptoms in the interim  Counseling Documentation With Imm: The patient was counseled regarding diagnostic results,-- instructions for management,-- patient and family education,-- impressions  total time of encounter was 40 minutes  Goals and Barriers: The patient has the current Goals: PROLONG SURVIVAL  The patent has the current Barriers: None  Patient's Capacity to Self-Care: Patient is able to Self-Care  History of Present Illness   HPI: June 2017âpatient presented to the emergency room with lower extremity edema and redness  Additionally, she was found to have a right pleural effusion  CT chest, abdomen, pelvis showed right hilar mass, 3 2 cm mediastinal lymph nodes up to 15 mm, subcarinal 17 mm  Indeterminate left adrenal nodule 13 mm, moderate to large right pleural effusion  Moderate to large abdominal ascites  No bone lesions noted  7, 2017â bronchoscopy showed a right bronchus lesion  Biopsy was consistent with a non-small cell lung carcinoma, favor squamous cell carcinoma  26, 2017âhypermetabolic right lung mass, 5 1 cm, SUV 23 2 with central necrosis  Right hilar adenopathy extending the subcarina, SUV 28 7  Right upper lobe cavitary lesion, 2 1 cm, SUV 1 7  Right 10th rib and T1 spinous processes with hypermetabolic lesions  Left adrenal mass, 2 3 cm, SUV 18 8 27th, 2017 started Abraxane 80 milligrams/meter squared day 1, 8, 15, carbo AUC -5, day 1 only, Q 21 days  2017- progression of disease  Tecentriq initiated             Interval History: seeing ENT this afternoon  does cause some pain  bump on back the day after her last office visit             Review of Systems   Complete-Female:      Constitutional: No fever, no chills, feels well, no tiredness, no recent weight gain or weight loss  Eyes: No complaints of eye pain, no red eyes, no eyesight problems, no discharge, no dry eyes, no itching of eyes        ENT: no complaints of earache, no loss of hearing, no nose bleeds, no nasal discharge, no sore throat, no hoarseness  Cardiovascular: No complaints of slow heart rate, no fast heart rate, no chest pain, no palpitations, no leg claudication, no lower extremity edema  Respiratory: No complaints of shortness of breath, no wheezing, no cough, no SOB on exertion, no orthopnea, no PND  Gastrointestinal: No complaints of abdominal pain, no constipation, no nausea or vomiting, no diarrhea, no bloody stools  Genitourinary: No complaints of dysuria, no incontinence, no pelvic pain, no dysmenorrhea, no vaginal discharge or bleeding  Musculoskeletal: No complaints of arthralgias, no myalgias, no joint swelling or stiffness, no limb pain or swelling  Integumentary: as noted in HPI  Neurological: No complaints of headache, no confusion, no convulsions, no numbness, no dizziness or fainting, no tingling, no limb weakness, no difficulty walking  Psychiatric: no anxiety-- and-- no depression  Endocrine: no muscle weakness  no feelings of weakness      Hematologic/Lymphatic: No complaints of swollen glands, no swollen glands in the neck, does not bleed easily, does not bruise easily  ROS Reviewed:    ROS reviewed  Active Problems   1  Anxiety (300 00) (F41 9)   2  History of leukocytosis (V12 3) (Z86 2)   3  Hypertension (401 9) (I10)   4  Hyponatremia (276 1) (E87 1)   5  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)   6  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 9) (C34 91)   7  Pain, joint, shoulder (719 41) (M25 519)   8  Pleural effusion (511 9) (J90)   9  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)    Past Medical History   1  History of Cellulitis of both feet (682 7) (L03 115,L03 116)   2  History of leukocytosis (V12 3) (Z86 2)   3  History of Lower leg edema (782 3) (R60 0)    Surgical History   1  History of Appendectomy   2   History of Gynecologic Services Insertion Of Pessary Ring 3  History of Tonsillectomy  Surgical History Reviewed: The surgical history was reviewed and updated today  Family History   Mother    1  Family history of dementia (V17 2) (Z81 8)  Father    2  Family history of cerebrovascular accident (CVA) (V17 1) (Z82 3)  Family History Reviewed: The family history was reviewed and updated today  Social History    · Employed   · Former smoker (B47 80) (C40 230)   ·   Social History Reviewed: The social history was reviewed and updated today  The social history was reviewed and is unchanged  Current Meds    1  ALPRAZolam 0 25 MG Oral Tablet; take 1 tablet at bedtime as needed; Therapy: 03GUF0980 to (Last Rx:05Oct2017) Ordered   2  Fiber TABS; Therapy: (Recorded:25Hwu2100) to Recorded   3  Metamucil CAPS; Therapy: (Mayuri Rocha) to Recorded   4  Metoprolol Succinate ER 25 MG Oral Tablet Extended Release 24 Hour; take 0 5 tablet     daily; Therapy: 92DAP5194 to (XRBJKIGL:29QXL1182)  Requested for: 07YZG1922; Last     Rx:02Nov2017 Ordered   5  Tecentriq 1200 MG/20ML Intravenous Solution; Therapy: (Mayuri Rocha) to Recorded   6  TraMADol HCl - 50 MG Oral Tablet; TAKE 1 TABLET EVERY 6 HOURS AS NEEDED FOR     BREAKTHROUGH PAIN;     Therapy: 11MBS9745 to (Evaluate:05Nov2017); Last Rx:06Oct2017 Ordered   7  Zolpidem Tartrate 5 MG Oral Tablet; Take 1 or 2 at bedtime for sleep; Therapy: 17OPO9534 to (NTRGMDBR:65GBY4004); Last TN:00MRL9676 Ordered  Medication List Reviewed: The medication list was reviewed and updated today  Allergies   1   No Known Drug Allergies    Vitals   Vital Signs    Recorded: 56Ovh8669 08:44AM   Temperature 99 1 F   Heart Rate 95   Respiration 16   Systolic 340   Diastolic 62   Height 5 ft 3 in   Weight 91 lb 12 8 oz   BMI Calculated 16 26   BSA Calculated 1 39   O2 Saturation 97   Pain Scale 6     Physical Exam        Constitutional      General appearance: No acute distress, well appearing and well nourished  Eyes      Conjunctiva and lids: No swelling, erythema or discharge  Ears, Nose, Mouth, and Throat      External inspection of ears and nose: Normal        Oropharynx: Normal with no erythema, edema, exudate or lesions  Pulmonary      Respiratory effort: No increased work of breathing or signs of respiratory distress  Cardiovascular      Auscultation of heart: Normal rate and rhythm, normal S1 and S2, without murmurs  Examination of extremities for edema and/or varicosities: Normal        Abdomen      Abdomen: Non-tender, no masses  Lymphatic      Palpation of lymph nodes in neck: No lymphadenopathy  Musculoskeletal      Gait and station: Normal        Skin      Skin and subcutaneous tissue: Abnormal  -- lesion on back  Neurologic      Cranial nerves: Cranial nerves 2-12 intact  Psychiatric      Orientation to person, place, and time: Normal        Mood and affect: Normal           Results/Data   (1) CBC/PLT/DIFF 62AEK8464 07:38AM Melani Conley Order Number: [id number]      Test Name Result Flag Reference   WBC COUNT 7 26 Thousand/uL  4 31-10 16   RBC COUNT 2 97 Million/uL L 3 81-5 12   HEMOGLOBIN 9 8 g/dL L 11 5-15 4   HEMATOCRIT 29 2 % L 34 8-46  1   MCV 98 fL  82-98   MCH 33 0 pg  26 8-34 3   MCHC 33 6 g/dL  31 4-37 4   RDW 15 5 % H 11 6-15 1   MPV 8 8 fL L 8 9-12 7   PLATELET COUNT 410 Thousands/uL H 149-390   nRBC AUTOMATED 0 /100 WBCs     NEUTROPHILS RELATIVE PERCENT 64 %  43-75   LYMPHOCYTES RELATIVE PERCENT 11 % L 14-44   MONOCYTES RELATIVE PERCENT 24 % H 4-12   EOSINOPHILS RELATIVE PERCENT 0 %  0-6   BASOPHILS RELATIVE PERCENT 1 %  0-1   NEUTROPHILS ABSOLUTE COUNT 4 58 Thousands/? ??L  1 85-7 62   LYMPHOCYTES ABSOLUTE COUNT 0 81 Thousands/? ??L  0 60-4 47   MONOCYTES ABSOLUTE COUNT 1 74 Thousand/? ??L H 0 17-1 22   EOSINOPHILS ABSOLUTE COUNT 0 03 Thousand/? ??L  0 00-0 61   BASOPHILS ABSOLUTE COUNT 0 06 Thousands/? ? ? L 0  00-0 10      (1) COMPREHENSIVE METABOLIC PANEL 79CCZ3276 83:25LC Stevie Ridley Order Number: [id number]      Test Name Result Flag Reference   SODIUM 129 mmol/L L 136-145   POTASSIUM 3 5 mmol/L  3 5-5 3   CHLORIDE 92 mmol/L L 100-108   CARBON DIOXIDE 29 mmol/L  21-32   ANION GAP (CALC) 8 mmol/L  4-13   BLOOD UREA NITROGEN 10 mg/dL  5-25   CREATININE 0 45 mg/dL L 0 60-1 30   Standardized to IDMS reference method   CALCIUM 9 0 mg/dL  8 3-10 1   BILI, TOTAL 0 38 mg/dL  0 20-1 00   ALK PHOSPHATAS 86 U/L     ALT (SGPT) 14 U/L  12-78   Specimen collection should occur prior to Sulfasalazine and/or Sulfapyridine administration due to the potential for falsely depressed results  AST(SGOT) 12 U/L  5-45   Specimen collection should occur prior to Sulfasalazine administration due to the potential for falsely depressed results  ALBUMIN 2 8 g/dL L 3 5-5 0   TOTAL PROTEIN 7 3 g/dL  6 4-8 2   eGFR 98 ml/min/1 73sq m     National Kidney Disease Education Program recommendations are as follows:     GFR calculation is accurate only with a steady state creatinine     Chronic Kidney disease less than 60 ml/min/1 73 sq  meters     Kidney failure less than 15 ml/min/1 73 sq  meters  GLUCOSE FASTING 94 mg/dL  65-99   Specimen collection should occur prior to Sulfasalazine administration due to the potential for falsely depressed results  Specimen collection should occur prior to Sulfapyridine administration due to the potential for falsely elevated results  (1) TSH 62CSE5420 07:38AM Stevie Ridley Order Number: [id number]      Test Name Result Flag Reference   TSH 0 906 uIU/mL  0 358-3 740   Patients undergoing fluorescein dye angiography may retain small amounts of fluorescein in the body for 48-72 hours post procedure  Samples containing fluorescein can produce falsely depressed TSH values  If the patient had this procedure,a specimen should be resubmitted post fluorescein clearance  The recommended reference ranges for TSH during pregnancy are as follows:     First trimester 0 1 to 2 5 uIU/mL     Second trimester  0 2 to 3 0 uIU/mL     Third trimester 0 3 to 3 0 uIU/m      Signatures    Electronically signed by : OTILIA Hazel; Miles 10 2018  9:14AM EST                       (Author)     Electronically signed by : JONNIE Mancuso  Jan 12 2018  4:26PM EST

## 2018-01-14 VITALS
WEIGHT: 94.25 LBS | HEIGHT: 63 IN | SYSTOLIC BLOOD PRESSURE: 152 MMHG | DIASTOLIC BLOOD PRESSURE: 74 MMHG | RESPIRATION RATE: 18 BRPM | HEART RATE: 89 BPM | OXYGEN SATURATION: 97 % | TEMPERATURE: 97.6 F | BODY MASS INDEX: 16.7 KG/M2

## 2018-01-14 VITALS
WEIGHT: 94 LBS | HEIGHT: 63 IN | DIASTOLIC BLOOD PRESSURE: 90 MMHG | SYSTOLIC BLOOD PRESSURE: 180 MMHG | BODY MASS INDEX: 16.66 KG/M2 | HEART RATE: 76 BPM

## 2018-01-14 NOTE — RESULT NOTES
Verified Results  (1) BASIC METABOLIC PROFILE [id number] 10:32AM Adelaida Jacobs Order Number: [id number]     Test Name Result Flag Reference   SODIUM 128 mmol/L L 136-145   POTASSIUM 4 1 mmol/L  3 5-5 3   CHLORIDE 93 mmol/L L 100-108   CARBON DIOXIDE 28 mmol/L  21-32   ANION GAP (CALC) 7 mmol/L  4-13   BLOOD UREA NITROGEN 9 mg/dL  5-25   CREATININE 0 50 mg/dL L 0 60-1 30   Standardized to IDMS reference method   CALCIUM 9 0 mg/dL  8 3-10 1   eGFR Non-African American      >60 0 ml/min/1 73sq m   [facility] Disease Education Program recommendations are as follows:  GFR calculation is accurate only with a steady state creatinine  Chronic Kidney disease less than 60 ml/min/1 73 sq  meters  Kidney failure less than 15 ml/min/1 73 sq  meters  GLUCOSE FASTING 134 mg/dL H 65-99     (1) CBC/PLT/DIFF 12OQZ0133 10:32AM Sitaaustin Joellen    Order Number: [id number]     Test Name Result Flag Reference   WBC COUNT 10 10 Thousand/uL  4 31-10 16   RBC COUNT 3 89 Million/uL  3 81-5 12   HEMOGLOBIN 12 1 g/dL  11 5-15 4   HEMATOCRIT 36 9 %  34 8-46  1   MCV 95 fL  82-98   MCH 31 1 pg  26 8-34 3   MCHC 32 8 g/dL  31 4-37 4   RDW 14 4 %  11 6-15 1   MPV 9 5 fL  8 9-12 7   PLATELET COUNT 612 Thousands/uL H 149-390   nRBC AUTOMATED 0 /100 WBCs     NEUTROPHILS RELATIVE PERCENT 74 %  43-75   LYMPHOCYTES RELATIVE PERCENT 12 % L 14-44   MONOCYTES RELATIVE PERCENT 12 %  4-12   EOSINOPHILS RELATIVE PERCENT 1 %  0-6   BASOPHILS RELATIVE PERCENT 1 %  0-1   NEUTROPHILS ABSOLUTE COUNT 7 52 Thousands/? ??L  1 85-7 62   LYMPHOCYTES ABSOLUTE COUNT 1 20 Thousands/? ??L  0 60-4 47   MONOCYTES ABSOLUTE COUNT 1 23 Thousand/? ??L H 0 17-1 22   EOSINOPHILS ABSOLUTE COUNT 0 06 Thousand/? ??L  0 00-0 61   BASOPHILS ABSOLUTE COUNT 0 06 Thousands/? ??L  0 00-0 10     * NM PET CT SKULL BASE TO MID THIGH [id number] 01:21PM Pa Adams     Test Name Result Flag Reference   NM PET CT SKULL BASE TO MID THIGH (Report)     PET/CT SCAN     INDICATION: Newly diagnosed lung cancer, initial staging     MODIFIER: PI        COMPARISON: CT 6/2/2017     CELL TYPE: Squamous cell cancer, right bronchus intermedius biopsy 6/7/2017     TECHNIQUE:  8 8 mCi F-18-FDG administered IV  Multiplanar attenuation corrected and non attenuation corrected PET images are available for interpretation, and contiguous, low dose, axial CT sections were obtained from the skull base through the femurs    following the administration of oral contrast material (7 5 cc Omnipaque-240 in 300 cc water)  Intravenous contrast material was not utilized  Fasting serum glucose: 95 mg/dl     FINDINGS:      VISUALIZED BRAIN:     No acute abnormalities are seen  HEAD/NECK:     There is a hypermetabolic left thyroid nodule, SUV 8 6  No hypermetabolic adenopathy  CT images: Otherwise unremarkable  CHEST:     Within the superior collapsed right lower lung, there is a hypermetabolic mass with an SUV of 23 2, most compatible with malignancy  There is central photopenia suggesting necrosis  Size measurement is difficult, but measures approximately 5 1 x 3 9 cm   based on the extent of FDG activity  There is mild FDG activity in the collapsed right lower lung base, SUV 3, which may be inflammatory/infectious  There is large confluent hypermetabolic adenopathy in the right hilum and extending to the subcarinal mediastinum, SUV 28 7, most compatible with metastases  There is also a small hypermetabolic nodule in the left paratracheal region, SUV 6 5  This    measures approximate 9 mm  In the right upper lung, there is a cavitary lesion with mildly thickened, hypermetabolic walls, measuring 1 7 x 2 1 cm, SUV 1 7  This may be inflammatory/infectious, or possibly neoplastic as well  In the right posterior chest wall, posterior to the right 10th rib, there is a hypermetabolic lesion measuring proximally 1 cm, SUV 4 7   In the right upper chest wall, to the right of the T1 spinous process, there is an additional hypermetabolic nodule    measuring approximate 1 7 cm, SUV 20 3  These are also concerning for metastases  CT images: Small right effusion  Coronary artery calcifications  ABDOMEN:     There is a hypermetabolic right lateral liver lesion, SUV 3 9, most concerning for a metastasis  This measures approximately 1 1 cm  There is a hypermetabolic left adrenal mass measuring 2 1 x 2 3 cm, SUV 18 8, most compatible with a metastasis  Possible early metastasis left anterior abdominal wall  CT images: 5 mm nonobstructing left renal calculus  PELVIS:    Hypermetabolic subcutaneous lesion posterior to the right iliac, measuring approximately 2 cm, SUV 10 6, most concerning for a metastasis  Additional hypermetabolic lesion posterior to the left gluteal muscles, SUV 5 2  Hypermetabolic nodule right perianal region image 233 measuring 1 3 x 1 3 cm, SUV 13 3  Subtle hypermetabolic focus in the left lateral gluteal muscles  These are also concerning for metastases  CT images: Otherwise unremarkable  OSSEOUS STRUCTURES:   Hypermetabolic lesion in the right lateral 7th rib, SUV 2 6, concerning for a metastasis  There is also a hypermetabolic lesion in the proximal left femur, SUV 13 9  Probable small hypermetabolic lesion left T4 transverse process  CT images: Spine degenerative change  IMPRESSION:   1  Large necrotic hypermetabolic mass in the right lower lung, most compatible with malignancy  Extensive right hilar and mediastinal hypermetabolic adenopathy  2  Mildly hypermetabolic cavitary lesion in the right upper lung, either inflammatory/infectious, or possibly neoplastic as well  3  Multiple subcutaneous hypermetabolic nodules as above, most concerning for metastases  4  Right liver, left adrenal, and osseous metastases  5  Hypermetabolic left thyroid nodule may be correlated with ultrasound  Workstation performed: [id number]     Signed by:   Araceli Munguia MD   6/26/17

## 2018-01-15 VITALS
WEIGHT: 98.2 LBS | SYSTOLIC BLOOD PRESSURE: 160 MMHG | HEART RATE: 92 BPM | HEIGHT: 63 IN | DIASTOLIC BLOOD PRESSURE: 86 MMHG | RESPIRATION RATE: 24 BRPM | BODY MASS INDEX: 17.4 KG/M2

## 2018-01-15 NOTE — PROGRESS NOTES
Discussion/Summary  Social Work-Discussion Summary [facility]: Patient is being seen for a distress screenning assessment  Received and reviewed Pt's Distress Thermometer completed by Pt on 7/18/17 in Dr Ayaan Shrestha office  Pt rated her distress at a 6/10 and identified the following emotional problems: fears, nervousness, sadness and worry  Based upon Pt's distress score, a social work follow up is indicated, so I called her  Pt stated she is feeling nervous about coming to chemo treatments but that her  is able to drive her and be with her  Provided emotionally supportive counseling  Oriented Pt to role of Cancer Counselor and provided contact information for myself and LUPE Kenyon  I let Pt know that Cancer Counselor is available to provide counseling and intervention         Signatures   Electronically signed by : Lara Rushing, [address]; Jul 20 2017  3:14PM EST                       (Author)

## 2018-01-16 ENCOUNTER — APPOINTMENT (OUTPATIENT)
Dept: LAB | Facility: CLINIC | Age: 78
End: 2018-01-16
Payer: COMMERCIAL

## 2018-01-16 DIAGNOSIS — C79.89 SECONDARY MALIGNANT NEOPLASM OF OTHER SPECIFIED SITES (CODE): ICD-10-CM

## 2018-01-16 LAB
ALBUMIN SERPL BCP-MCNC: 3 G/DL (ref 3.5–5)
ALP SERPL-CCNC: 98 U/L (ref 46–116)
ALT SERPL W P-5'-P-CCNC: 18 U/L (ref 12–78)
ANION GAP SERPL CALCULATED.3IONS-SCNC: 4 MMOL/L (ref 4–13)
AST SERPL W P-5'-P-CCNC: 13 U/L (ref 5–45)
BASOPHILS # BLD AUTO: 0.09 THOUSANDS/ΜL (ref 0–0.1)
BASOPHILS NFR BLD AUTO: 2 % (ref 0–1)
BILIRUB SERPL-MCNC: 0.34 MG/DL (ref 0.2–1)
BUN SERPL-MCNC: 10 MG/DL (ref 5–25)
CALCIUM SERPL-MCNC: 9.4 MG/DL (ref 8.3–10.1)
CHLORIDE SERPL-SCNC: 99 MMOL/L (ref 100–108)
CO2 SERPL-SCNC: 32 MMOL/L (ref 21–32)
CREAT SERPL-MCNC: 0.46 MG/DL (ref 0.6–1.3)
EOSINOPHIL # BLD AUTO: 0.17 THOUSAND/ΜL (ref 0–0.61)
EOSINOPHIL NFR BLD AUTO: 3 % (ref 0–6)
ERYTHROCYTE [DISTWIDTH] IN BLOOD BY AUTOMATED COUNT: 16 % (ref 11.6–15.1)
GFR SERPL CREATININE-BSD FRML MDRD: 98 ML/MIN/1.73SQ M
GLUCOSE P FAST SERPL-MCNC: 96 MG/DL (ref 65–99)
HCT VFR BLD AUTO: 32.8 % (ref 34.8–46.1)
HGB BLD-MCNC: 10.3 G/DL (ref 11.5–15.4)
LYMPHOCYTES # BLD AUTO: 1.02 THOUSANDS/ΜL (ref 0.6–4.47)
LYMPHOCYTES NFR BLD AUTO: 17 % (ref 14–44)
MCH RBC QN AUTO: 29.9 PG (ref 26.8–34.3)
MCHC RBC AUTO-ENTMCNC: 31.4 G/DL (ref 31.4–37.4)
MCV RBC AUTO: 95 FL (ref 82–98)
MONOCYTES # BLD AUTO: 0.96 THOUSAND/ΜL (ref 0.17–1.22)
MONOCYTES NFR BLD AUTO: 16 % (ref 4–12)
NEUTROPHILS # BLD AUTO: 3.83 THOUSANDS/ΜL (ref 1.85–7.62)
NEUTS SEG NFR BLD AUTO: 62 % (ref 43–75)
NRBC BLD AUTO-RTO: 0 /100 WBCS
PLATELET # BLD AUTO: 641 THOUSANDS/UL (ref 149–390)
PMV BLD AUTO: 8.4 FL (ref 8.9–12.7)
POTASSIUM SERPL-SCNC: 3.7 MMOL/L (ref 3.5–5.3)
PROT SERPL-MCNC: 8 G/DL (ref 6.4–8.2)
RBC # BLD AUTO: 3.44 MILLION/UL (ref 3.81–5.12)
SODIUM SERPL-SCNC: 135 MMOL/L (ref 136–145)
TSH SERPL DL<=0.05 MIU/L-ACNC: 1.45 UIU/ML (ref 0.36–3.74)
WBC # BLD AUTO: 6.09 THOUSAND/UL (ref 4.31–10.16)

## 2018-01-16 PROCEDURE — 84443 ASSAY THYROID STIM HORMONE: CPT

## 2018-01-16 PROCEDURE — 80053 COMPREHEN METABOLIC PANEL: CPT

## 2018-01-16 PROCEDURE — 85025 COMPLETE CBC W/AUTO DIFF WBC: CPT

## 2018-01-16 PROCEDURE — 36415 COLL VENOUS BLD VENIPUNCTURE: CPT

## 2018-01-16 RX ORDER — SODIUM CHLORIDE 9 MG/ML
20 INJECTION, SOLUTION INTRAVENOUS CONTINUOUS
Status: DISCONTINUED | OUTPATIENT
Start: 2018-01-17 | End: 2018-01-20 | Stop reason: HOSPADM

## 2018-01-17 ENCOUNTER — HOSPITAL ENCOUNTER (OUTPATIENT)
Dept: INFUSION CENTER | Facility: CLINIC | Age: 78
Discharge: HOME | End: 2018-01-17
Payer: COMMERCIAL

## 2018-01-17 VITALS
SYSTOLIC BLOOD PRESSURE: 172 MMHG | DIASTOLIC BLOOD PRESSURE: 80 MMHG | BODY MASS INDEX: 16.48 KG/M2 | TEMPERATURE: 97.4 F | RESPIRATION RATE: 16 BRPM | WEIGHT: 91.71 LBS | HEART RATE: 100 BPM

## 2018-01-17 PROCEDURE — 96413 CHEMO IV INFUSION 1 HR: CPT

## 2018-01-17 RX ADMIN — ATEZOLIZUMAB 1200 MG: 1200 INJECTION, SOLUTION INTRAVENOUS at 09:38

## 2018-01-17 RX ADMIN — SODIUM CHLORIDE 20 ML/HR: 0.9 INJECTION, SOLUTION INTRAVENOUS at 09:29

## 2018-01-17 NOTE — PLAN OF CARE

## 2018-01-22 VITALS
HEIGHT: 63 IN | DIASTOLIC BLOOD PRESSURE: 90 MMHG | WEIGHT: 95.5 LBS | SYSTOLIC BLOOD PRESSURE: 184 MMHG | HEART RATE: 88 BPM | BODY MASS INDEX: 16.92 KG/M2

## 2018-01-22 VITALS
WEIGHT: 92 LBS | DIASTOLIC BLOOD PRESSURE: 90 MMHG | RESPIRATION RATE: 16 BRPM | OXYGEN SATURATION: 99 % | SYSTOLIC BLOOD PRESSURE: 160 MMHG | BODY MASS INDEX: 16.3 KG/M2 | HEIGHT: 63 IN | TEMPERATURE: 97.6 F | HEART RATE: 93 BPM

## 2018-01-22 VITALS
DIASTOLIC BLOOD PRESSURE: 40 MMHG | TEMPERATURE: 99.6 F | WEIGHT: 93.13 LBS | SYSTOLIC BLOOD PRESSURE: 100 MMHG | RESPIRATION RATE: 18 BRPM | OXYGEN SATURATION: 96 % | BODY MASS INDEX: 16.5 KG/M2 | HEIGHT: 63 IN | HEART RATE: 108 BPM

## 2018-01-22 VITALS
HEIGHT: 63 IN | RESPIRATION RATE: 16 BRPM | DIASTOLIC BLOOD PRESSURE: 64 MMHG | SYSTOLIC BLOOD PRESSURE: 134 MMHG | HEART RATE: 88 BPM | WEIGHT: 95.13 LBS | BODY MASS INDEX: 16.86 KG/M2

## 2018-01-23 VITALS
OXYGEN SATURATION: 97 % | BODY MASS INDEX: 16.27 KG/M2 | SYSTOLIC BLOOD PRESSURE: 122 MMHG | DIASTOLIC BLOOD PRESSURE: 62 MMHG | HEIGHT: 63 IN | TEMPERATURE: 99.1 F | RESPIRATION RATE: 16 BRPM | WEIGHT: 91.8 LBS | HEART RATE: 95 BPM

## 2018-01-24 VITALS
DIASTOLIC BLOOD PRESSURE: 80 MMHG | SYSTOLIC BLOOD PRESSURE: 138 MMHG | TEMPERATURE: 98.4 F | HEIGHT: 63 IN | RESPIRATION RATE: 16 BRPM | OXYGEN SATURATION: 99 % | HEART RATE: 90 BPM | WEIGHT: 90 LBS | BODY MASS INDEX: 15.95 KG/M2

## 2018-01-25 ENCOUNTER — HOSPITAL ENCOUNTER (OUTPATIENT)
Dept: RADIOLOGY | Age: 78
Discharge: HOME | End: 2018-01-25
Payer: COMMERCIAL

## 2018-01-25 VITALS — WEIGHT: 91 LBS | BODY MASS INDEX: 16.12 KG/M2

## 2018-01-25 DIAGNOSIS — C34.90 MALIGNANT NEOPLASM OF LUNG, UNSPECIFIED LATERALITY, UNSPECIFIED PART OF LUNG (HCC): ICD-10-CM

## 2018-01-25 LAB — GLUCOSE SERPL-MCNC: 89 MG/DL (ref 65–140)

## 2018-01-25 PROCEDURE — 78815 PET IMAGE W/CT SKULL-THIGH: CPT

## 2018-01-25 PROCEDURE — A9552 F18 FDG: HCPCS

## 2018-01-25 PROCEDURE — 82948 REAGENT STRIP/BLOOD GLUCOSE: CPT

## 2018-01-25 RX ADMIN — IOHEXOL 5 ML: 240 INJECTION, SOLUTION INTRATHECAL; INTRAVASCULAR; INTRAVENOUS; ORAL at 07:45

## 2018-01-30 ENCOUNTER — APPOINTMENT (OUTPATIENT)
Dept: LAB | Facility: CLINIC | Age: 78
End: 2018-01-30
Payer: COMMERCIAL

## 2018-01-30 ENCOUNTER — TRANSCRIBE ORDERS (OUTPATIENT)
Dept: LAB | Facility: CLINIC | Age: 78
End: 2018-01-30

## 2018-01-30 DIAGNOSIS — C79.89 SECONDARY MALIGNANT NEOPLASM OF OTHER SPECIFIED SITES (CODE): ICD-10-CM

## 2018-01-30 DIAGNOSIS — C79.89 METASTATIC SARCOMA (HCC): Primary | ICD-10-CM

## 2018-01-30 LAB
ALBUMIN SERPL BCP-MCNC: 3.3 G/DL (ref 3.5–5)
ALP SERPL-CCNC: 97 U/L (ref 46–116)
ALT SERPL W P-5'-P-CCNC: 20 U/L (ref 12–78)
ANION GAP SERPL CALCULATED.3IONS-SCNC: 7 MMOL/L (ref 4–13)
AST SERPL W P-5'-P-CCNC: 15 U/L (ref 5–45)
BASOPHILS # BLD AUTO: 0.09 THOUSANDS/ΜL (ref 0–0.1)
BASOPHILS NFR BLD AUTO: 2 % (ref 0–1)
BILIRUB SERPL-MCNC: 0.34 MG/DL (ref 0.2–1)
BUN SERPL-MCNC: 12 MG/DL (ref 5–25)
CALCIUM SERPL-MCNC: 9 MG/DL (ref 8.3–10.1)
CHLORIDE SERPL-SCNC: 101 MMOL/L (ref 100–108)
CO2 SERPL-SCNC: 29 MMOL/L (ref 21–32)
CREAT SERPL-MCNC: 0.55 MG/DL (ref 0.6–1.3)
EOSINOPHIL # BLD AUTO: 0.17 THOUSAND/ΜL (ref 0–0.61)
EOSINOPHIL NFR BLD AUTO: 3 % (ref 0–6)
ERYTHROCYTE [DISTWIDTH] IN BLOOD BY AUTOMATED COUNT: 16.4 % (ref 11.6–15.1)
GFR SERPL CREATININE-BSD FRML MDRD: 92 ML/MIN/1.73SQ M
GLUCOSE P FAST SERPL-MCNC: 130 MG/DL (ref 65–99)
HCT VFR BLD AUTO: 34.2 % (ref 34.8–46.1)
HGB BLD-MCNC: 10.8 G/DL (ref 11.5–15.4)
LYMPHOCYTES # BLD AUTO: 1.4 THOUSANDS/ΜL (ref 0.6–4.47)
LYMPHOCYTES NFR BLD AUTO: 27 % (ref 14–44)
MCH RBC QN AUTO: 30 PG (ref 26.8–34.3)
MCHC RBC AUTO-ENTMCNC: 31.6 G/DL (ref 31.4–37.4)
MCV RBC AUTO: 95 FL (ref 82–98)
MONOCYTES # BLD AUTO: 0.58 THOUSAND/ΜL (ref 0.17–1.22)
MONOCYTES NFR BLD AUTO: 11 % (ref 4–12)
NEUTROPHILS # BLD AUTO: 2.9 THOUSANDS/ΜL (ref 1.85–7.62)
NEUTS SEG NFR BLD AUTO: 57 % (ref 43–75)
NRBC BLD AUTO-RTO: 0 /100 WBCS
PLATELET # BLD AUTO: 436 THOUSANDS/UL (ref 149–390)
PMV BLD AUTO: 8.7 FL (ref 8.9–12.7)
POTASSIUM SERPL-SCNC: 3.6 MMOL/L (ref 3.5–5.3)
PROT SERPL-MCNC: 8 G/DL (ref 6.4–8.2)
RBC # BLD AUTO: 3.6 MILLION/UL (ref 3.81–5.12)
SODIUM SERPL-SCNC: 137 MMOL/L (ref 136–145)
TSH SERPL DL<=0.05 MIU/L-ACNC: 2.64 UIU/ML (ref 0.36–3.74)
WBC # BLD AUTO: 5.15 THOUSAND/UL (ref 4.31–10.16)

## 2018-01-30 PROCEDURE — 36415 COLL VENOUS BLD VENIPUNCTURE: CPT

## 2018-01-30 PROCEDURE — 85025 COMPLETE CBC W/AUTO DIFF WBC: CPT

## 2018-01-30 PROCEDURE — 84443 ASSAY THYROID STIM HORMONE: CPT

## 2018-01-30 PROCEDURE — 80053 COMPREHEN METABOLIC PANEL: CPT

## 2018-01-31 ENCOUNTER — OFFICE VISIT (OUTPATIENT)
Dept: HEMATOLOGY ONCOLOGY | Facility: CLINIC | Age: 78
End: 2018-01-31
Payer: COMMERCIAL

## 2018-01-31 VITALS
BODY MASS INDEX: 16.57 KG/M2 | DIASTOLIC BLOOD PRESSURE: 78 MMHG | HEART RATE: 77 BPM | SYSTOLIC BLOOD PRESSURE: 142 MMHG | WEIGHT: 93.5 LBS | RESPIRATION RATE: 16 BRPM | HEIGHT: 63 IN | TEMPERATURE: 97.3 F

## 2018-01-31 DIAGNOSIS — C79.51 LUNG CANCER METASTATIC TO BONE (HCC): Primary | ICD-10-CM

## 2018-01-31 DIAGNOSIS — C34.90 LUNG CANCER METASTATIC TO BONE (HCC): Primary | ICD-10-CM

## 2018-01-31 PROCEDURE — 99214 OFFICE O/P EST MOD 30 MIN: CPT | Performed by: INTERNAL MEDICINE

## 2018-01-31 RX ORDER — PREDNISONE 20 MG/1
TABLET ORAL
Refills: 0 | COMMUNITY
Start: 2018-01-10 | End: 2018-05-23 | Stop reason: ALTCHOICE

## 2018-02-06 ENCOUNTER — APPOINTMENT (OUTPATIENT)
Dept: LAB | Facility: CLINIC | Age: 78
End: 2018-02-06
Payer: COMMERCIAL

## 2018-02-06 LAB — TSH SERPL DL<=0.05 MIU/L-ACNC: 3.29 UIU/ML (ref 0.36–3.74)

## 2018-02-06 PROCEDURE — 36415 COLL VENOUS BLD VENIPUNCTURE: CPT

## 2018-02-06 PROCEDURE — 84443 ASSAY THYROID STIM HORMONE: CPT

## 2018-02-06 PROCEDURE — 80053 COMPREHEN METABOLIC PANEL: CPT

## 2018-02-06 PROCEDURE — 85025 COMPLETE CBC W/AUTO DIFF WBC: CPT

## 2018-02-06 RX ORDER — SODIUM CHLORIDE 9 MG/ML
20 INJECTION, SOLUTION INTRAVENOUS CONTINUOUS
Status: DISCONTINUED | OUTPATIENT
Start: 2018-02-07 | End: 2018-02-10 | Stop reason: HOSPADM

## 2018-02-07 ENCOUNTER — HOSPITAL ENCOUNTER (OUTPATIENT)
Dept: INFUSION CENTER | Facility: CLINIC | Age: 78
Discharge: HOME | End: 2018-02-07
Payer: COMMERCIAL

## 2018-02-07 VITALS
SYSTOLIC BLOOD PRESSURE: 156 MMHG | WEIGHT: 91.71 LBS | TEMPERATURE: 96.9 F | BODY MASS INDEX: 16.25 KG/M2 | DIASTOLIC BLOOD PRESSURE: 96 MMHG | RESPIRATION RATE: 18 BRPM | HEART RATE: 78 BPM

## 2018-02-07 PROCEDURE — 96413 CHEMO IV INFUSION 1 HR: CPT

## 2018-02-07 RX ADMIN — SODIUM CHLORIDE 20 ML/HR: 0.9 INJECTION, SOLUTION INTRAVENOUS at 08:53

## 2018-02-07 RX ADMIN — ATEZOLIZUMAB 1200 MG: 1200 INJECTION, SOLUTION INTRAVENOUS at 08:51

## 2018-02-07 NOTE — PROGRESS NOTES
Pt feeling well, tolerated Tecentriq infusion over one hour without any adverse reaction, left unit in stable condition, AVS provided

## 2018-02-07 NOTE — PLAN OF CARE
Problem: Potential for Falls  Goal: Patient will remain free of falls  INTERVENTIONS:  - Assess patient frequently for physical needs  -  Identify cognitive and physical deficits and behaviors that affect risk of falls    -  [place] fall precautions as indicated by assessment   - Educate patient/family on patient safety including physical limitations  - Instruct patient to call for assistance with activity based on assessment  - Modify environment to reduce risk of injury  - Consider OT/PT consult to assist with strengthening/mobility   Outcome: Progressing

## 2018-02-26 ENCOUNTER — APPOINTMENT (OUTPATIENT)
Dept: LAB | Facility: CLINIC | Age: 78
End: 2018-02-26
Payer: COMMERCIAL

## 2018-02-26 LAB — TSH SERPL DL<=0.05 MIU/L-ACNC: 2.85 UIU/ML (ref 0.36–3.74)

## 2018-02-26 PROCEDURE — 84443 ASSAY THYROID STIM HORMONE: CPT

## 2018-02-26 PROCEDURE — 85025 COMPLETE CBC W/AUTO DIFF WBC: CPT

## 2018-02-26 PROCEDURE — 80053 COMPREHEN METABOLIC PANEL: CPT

## 2018-02-26 PROCEDURE — 36415 COLL VENOUS BLD VENIPUNCTURE: CPT

## 2018-02-28 ENCOUNTER — HOSPITAL ENCOUNTER (OUTPATIENT)
Dept: INFUSION CENTER | Facility: CLINIC | Age: 78
Discharge: HOME | End: 2018-02-28
Payer: COMMERCIAL

## 2018-02-28 VITALS
BODY MASS INDEX: 17.46 KG/M2 | RESPIRATION RATE: 16 BRPM | WEIGHT: 98.55 LBS | TEMPERATURE: 97 F | SYSTOLIC BLOOD PRESSURE: 169 MMHG | HEART RATE: 69 BPM | DIASTOLIC BLOOD PRESSURE: 86 MMHG

## 2018-02-28 PROCEDURE — 96413 CHEMO IV INFUSION 1 HR: CPT

## 2018-02-28 RX ORDER — SODIUM CHLORIDE 9 MG/ML
20 INJECTION, SOLUTION INTRAVENOUS CONTINUOUS
Status: DISCONTINUED | OUTPATIENT
Start: 2018-02-28 | End: 2018-03-03 | Stop reason: HOSPADM

## 2018-02-28 RX ADMIN — SODIUM CHLORIDE 20 ML/HR: 0.9 INJECTION, SOLUTION INTRAVENOUS at 09:09

## 2018-02-28 RX ADMIN — ATEZOLIZUMAB 1200 MG: 1200 INJECTION, SOLUTION INTRAVENOUS at 09:44

## 2018-02-28 NOTE — PLAN OF CARE
Problem: Knowledge Deficit  Goal: Patient/family/caregiver demonstrates understanding of disease process, treatment plan, medications, and discharge instructions  Complete learning assessment and assess knowledge base    Interventions:  - Provide teaching at level of understanding  - Provide teaching via preferred learning methods  Outcome: Progressing
Problem: Potential for Falls  Goal: Patient will remain free of falls  INTERVENTIONS:  - Assess patient frequently for physical needs  -  Identify cognitive and physical deficits and behaviors that affect risk of falls    -  [place] fall precautions as indicated by assessment   - Educate patient/family on patient safety including physical limitations  - Instruct patient to call for assistance with activity based on assessment  - Modify environment to reduce risk of injury  - Consider OT/PT consult to assist with strengthening/mobility   Outcome: Progressing
negative

## 2018-02-28 NOTE — PROGRESS NOTES
Patient tolerated chemotherapy  Denies any discomforts  Aware of next appointment   AVS given to patient

## 2018-03-14 ENCOUNTER — OFFICE VISIT (OUTPATIENT)
Dept: HEMATOLOGY ONCOLOGY | Facility: CLINIC | Age: 78
End: 2018-03-14
Payer: COMMERCIAL

## 2018-03-14 VITALS
RESPIRATION RATE: 16 BRPM | WEIGHT: 100.5 LBS | BODY MASS INDEX: 17.81 KG/M2 | HEIGHT: 63 IN | TEMPERATURE: 98.2 F | HEART RATE: 74 BPM | DIASTOLIC BLOOD PRESSURE: 78 MMHG | SYSTOLIC BLOOD PRESSURE: 152 MMHG

## 2018-03-14 DIAGNOSIS — C34.90 LUNG CANCER METASTATIC TO BONE (HCC): Primary | ICD-10-CM

## 2018-03-14 DIAGNOSIS — C79.51 LUNG CANCER METASTATIC TO BONE (HCC): Primary | ICD-10-CM

## 2018-03-14 PROCEDURE — 99215 OFFICE O/P EST HI 40 MIN: CPT | Performed by: INTERNAL MEDICINE

## 2018-03-14 NOTE — PROGRESS NOTES
Kaylie Singletary  1940  [id number] [facility]  [facility]  [address] [address]    Chief Complaint   Patient presents with    Follow-up           Oncology History                 Lung cancer metastatic to bone [facility])    6/30/2017 Initial Diagnosis     Lung cancer metastatic to bone [facility])   June 2017-patient presented to the emergency room with lower extremity edema and redness  Additionally, she was found to have a right pleural effusion  CT chest, abdomen, pelvis showed right hilar mass, 3 2 cm mediastinal lymph nodes up to 15 mm, subcarinal 17 mm  Indeterminate left adrenal nodule 13 mm, moderate to large right pleural effusion  Moderate to large abdominal ascites  No bone lesions noted  June 7, 2017- bronchoscopy showed a right bronchus lesion  Biopsy was consistent with a non-small cell lung carcinoma, favor squamous cell carcinoma  7/27/2017 - 11/29/2017 Chemotherapy      Abraxane 80 milligrams/meter squared day 1, 8, 15, carbo AUC -5, day 1 only, Q 21 days  12/1/2017 Progression      Progressive disease noted by radiography and physical exam, left inferior cervical lymph node mass  12/6/2017 -  Chemotherapy       Tecentriq 1200 mg IV Q 3 weeks initiated  History of Present Illness:  -Nile Berger MD:  -Previous Therapy (if not listed in Oncology History):  -Current Therapy (if not listed in Oncology History):  -Disease Status:  -Interval History:  -Tumor Markers:    Review of Systems:  Review of Systems   Constitutional: Negative for appetite change, diaphoresis, fatigue and fever  HENT: Negative for sinus pain  Eyes: Negative for discharge  Respiratory: Negative for cough and shortness of breath  Cardiovascular: Negative for chest pain  Gastrointestinal: Negative for abdominal pain, constipation and diarrhea  Endocrine: Negative for cold intolerance     Genitourinary: Negative for difficulty urinating and hematuria  Musculoskeletal: Negative for joint swelling  Skin: Negative for rash  Allergic/Immunologic: Negative for environmental allergies  Neurological: Negative for dizziness and headaches  Hematological: Negative for adenopathy  Psychiatric/Behavioral: Negative for agitation  Patient Active Problem List   Diagnosis    Cellulitis    Pleural effusion    Current smoker on some days    Hyponatremia    Hilar mass    Lung cancer metastatic [facility] bone [facility])     Past Medical History:   Diagnosis Date    Anxiety     Difficulty chewing     Difficulty swallowing     Edema leg     history of bilat LE edema bilat    Full dentures     History of cancer chemotherapy     most recent treatment 10/18 and sched to receive  once a  week till Dec    Hypertension     Hyponatremia     Leukocytosis     Lump     "more on right side" of tongue    Lump     left buttock/biopsy last week    Lung cancer [address] [address] )     right    RODRIGO (mycobacterium avium-intracellulare) ([facility] [address] )     pt and spouse unsure    Pleural effusion     pt and spouse unsure    Productive cough     occas, light yellow thick    Shortness of breath     occas    Shoulder blade pain     left    Tongue pain     Wears glasses      Past Surgical History:   Procedure Laterality Date    ADENOIDECTOMY      APPENDECTOMY      BRONCHOSCOPY N/A 6/7/2017    Procedure: BRONCHOSCOPY FLEXIBLE with endobronchial washings, brush biopsy to the bronchus intermedius and right lower lobe bronchus  FNA of the level VII lymph node, multiple passes  Forceps biopsies to the bronchus intermedius  ;  Surgeon: Marii Mcginnis MD;  [facility]; Service:     MELISA STONE,POST 1/3 Right 11/7/2017    Procedure: EXCISIONAL BIOPSY TONGUE MASS;  Surgeon: Shyann Bell DO;  [facility];  Service: ENT    SKIN BIOPSY Left     buttock    TONSILLECTOMY      UTERINE SUSPENSION       No family history on file    Social History Social History    Marital status: /Civil Union     Spouse name: N/A    Number of children: N/A    Years of education: N/A     Occupational History    Not on file  Social History Main Topics    Smoking status: Former Smoker     Packs/day: 0 50     Years: 15 00     Quit date: 05/2017    Smokeless tobacco: Never Used      Comment: quit cold turkey    Alcohol use Yes      Comment: Socially    Drug use: No    Sexual activity: Not on file     Other Topics Concern    Not on file     Social History Narrative    No narrative on file       Current Outpatient Prescriptions:     ALPRAZolam (XANAX) 0 25 mg tablet, Take 0 25 mg by mouth 4 (four) times a day as needed for anxiety Take 1/2 tab , Disp: , Rfl:     Atezolizumab (TECENTRIQ IV), Infuse into a venous catheter, Disp: , Rfl:     calcium polycarbophil (FIBERCON) 625 mg tablet, Take 625 mg by mouth daily, Disp: , Rfl:     metoprolol succinate (TOPROL-XL) 25 mg 24 hr tablet, Take 12 5 mg by mouth daily  , Disp: , Rfl:     oxyCODONE-acetaminophen (PERCOCET) 5-325 mg per tablet, Take as directed at home, Disp: , Rfl: 0    psyllium (METAMUCIL) 0 52 g capsule, Take by mouth, Disp: , Rfl:     traMADol (ULTRAM) 50 mg tablet, Take 50 mg by mouth every 6 (six) hours as needed for moderate pain, Disp: , Rfl:     zolpidem (AMBIEN) 5 mg tablet, Take 5 mg by mouth daily at bedtime as needed for sleep, Disp: , Rfl:     predniSONE 20 mg tablet, TAKE 1 TABLET DAILY IN AM WITH FOOD FOR APPETITE , Disp: , Rfl: 0  No Known Allergies  Vitals:    03/14/18 0843   BP: 152/78   Pulse: 74   Resp: 16   Temp: 98 2 °F (36 8 °C)         Physical Exam   Constitutional: She is oriented to person, place, and time  She appears well-developed  HENT:   Head: Normocephalic  Eyes: Pupils are equal, round, and reactive to light  Neck: Neck supple  Cardiovascular: Normal rate  No murmur heard  Pulmonary/Chest: No respiratory distress  She has no wheezes   She has no rales    Abdominal: Soft  She exhibits no distension  There is no tenderness  There is no rebound  Musculoskeletal: She exhibits no edema  Lymphadenopathy:     She has no cervical adenopathy  Neurological: She is alert and oriented to person, place, and time  She displays normal reflexes  Skin: Skin is warm  No rash noted  Psychiatric: She has a normal mood and affect  Thought content normal            Performance Status: ECOG/Zubrod/WHO: 1 - Symptomatic but completely ambulatory    Labs:  CBC, Coags, BMP, Mg, Phos     Imaging  No results found  I reviewed the above laboratory and imaging data  Discussion/Summary:In summary, this is a 62-year-old female history of 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  Clinically she is doing fairly well  She has chronic low back pain  This bothers her at least 3 or 4 days a week for half a day or longer  She has not taken any analgesics  Tramadol or Percocet, 1/2 tablet b i d  P r n  Were recommended  PET-CT showed no evidence of activity in the lumbar spine and I do not believe that her cancer is the cause of her back pain  She has had "sciatica" and had an injection in the past with temporary benefit  If the Percocet is ineffective this could be reconsidered  Additionally, she reports orthostatics symptoms  I note that she is on tramadol  Blood pressure is 130/80  I suggested better hydration  The symptoms are not suggestive of brain metastases and I do not think MRI is indicated at this time  From a cancer and chemotherapy viewpoint the patient is doing well  She seems to be tolerating treatment without difficulty  Labs are acceptable to move forward  Repeat imaging just prior to her next visit in 6 weeks  I reviewed the above with the patient and her   They voiced understanding and agreement

## 2018-03-20 ENCOUNTER — TRANSCRIBE ORDERS (OUTPATIENT)
Dept: LAB | Facility: CLINIC | Age: 78
End: 2018-03-20

## 2018-03-20 ENCOUNTER — APPOINTMENT (OUTPATIENT)
Dept: LAB | Facility: CLINIC | Age: 78
End: 2018-03-20
Payer: COMMERCIAL

## 2018-03-20 DIAGNOSIS — C79.89 METASTATIC SARCOMA (HCC): Primary | ICD-10-CM

## 2018-03-20 LAB — TSH SERPL DL<=0.05 MIU/L-ACNC: 2.89 UIU/ML (ref 0.36–3.74)

## 2018-03-20 PROCEDURE — 80053 COMPREHEN METABOLIC PANEL: CPT

## 2018-03-20 PROCEDURE — 84443 ASSAY THYROID STIM HORMONE: CPT

## 2018-03-20 PROCEDURE — 36415 COLL VENOUS BLD VENIPUNCTURE: CPT

## 2018-03-20 PROCEDURE — 85025 COMPLETE CBC W/AUTO DIFF WBC: CPT

## 2018-03-20 RX ORDER — SODIUM CHLORIDE 9 MG/ML
20 INJECTION, SOLUTION INTRAVENOUS CONTINUOUS
Status: DISCONTINUED | OUTPATIENT
Start: 2018-03-21 | End: 2018-03-24 | Stop reason: HOSPADM

## 2018-03-21 ENCOUNTER — HOSPITAL ENCOUNTER (OUTPATIENT)
Dept: INFUSION CENTER | Facility: CLINIC | Age: 78
Discharge: HOME | End: 2018-03-21
Payer: COMMERCIAL

## 2018-03-21 VITALS
BODY MASS INDEX: 18.16 KG/M2 | SYSTOLIC BLOOD PRESSURE: 176 MMHG | HEART RATE: 74 BPM | RESPIRATION RATE: 18 BRPM | DIASTOLIC BLOOD PRESSURE: 99 MMHG | WEIGHT: 102.51 LBS | TEMPERATURE: 97.7 F

## 2018-03-21 PROCEDURE — 96413 CHEMO IV INFUSION 1 HR: CPT

## 2018-03-21 RX ADMIN — ATEZOLIZUMAB 1200 MG: 1200 INJECTION, SOLUTION INTRAVENOUS at 08:43

## 2018-03-21 RX ADMIN — SODIUM CHLORIDE 20 ML/HR: 0.9 INJECTION, SOLUTION INTRAVENOUS at 08:25

## 2018-03-21 NOTE — PROGRESS NOTES
Patient tolerated chemotherapy infusion well  Pt offers no complaints  Pt is aware of all future appointments

## 2018-03-21 NOTE — PLAN OF CARE
Problem: Potential for Falls  Goal: Patient will remain free of falls  INTERVENTIONS:  - Assess patient frequently for physical needs  -  Identify cognitive and physical deficits and behaviors that affect risk of falls    -  [place] fall precautions as indicated by assessment   - Educate patient/family on patient safety including physical limitations  - Instruct patient to call for assistance with activity based on assessment  - Modify environment to reduce risk of injury  - Consider OT/PT consult to assist with strengthening/mobility   Outcome: Progressing

## 2018-04-09 ENCOUNTER — APPOINTMENT (OUTPATIENT)
Dept: LAB | Facility: CLINIC | Age: 78
End: 2018-04-09
Payer: COMMERCIAL

## 2018-04-09 LAB — TSH SERPL DL<=0.05 MIU/L-ACNC: 1.88 UIU/ML (ref 0.36–3.74)

## 2018-04-09 PROCEDURE — 84443 ASSAY THYROID STIM HORMONE: CPT

## 2018-04-09 PROCEDURE — 85025 COMPLETE CBC W/AUTO DIFF WBC: CPT

## 2018-04-09 PROCEDURE — 36415 COLL VENOUS BLD VENIPUNCTURE: CPT

## 2018-04-09 PROCEDURE — 80053 COMPREHEN METABOLIC PANEL: CPT

## 2018-04-10 RX ORDER — SODIUM CHLORIDE 9 MG/ML
20 INJECTION, SOLUTION INTRAVENOUS CONTINUOUS
Status: DISCONTINUED | OUTPATIENT
Start: 2018-04-11 | End: 2018-04-14 | Stop reason: HOSPADM

## 2018-04-11 ENCOUNTER — HOSPITAL ENCOUNTER (OUTPATIENT)
Dept: INFUSION CENTER | Facility: CLINIC | Age: 78
Discharge: HOME | End: 2018-04-11
Payer: COMMERCIAL

## 2018-04-11 ENCOUNTER — TELEPHONE (OUTPATIENT)
Dept: HEMATOLOGY ONCOLOGY | Facility: CLINIC | Age: 78
End: 2018-04-11

## 2018-04-11 VITALS
BODY MASS INDEX: 18.52 KG/M2 | DIASTOLIC BLOOD PRESSURE: 78 MMHG | HEIGHT: 63 IN | SYSTOLIC BLOOD PRESSURE: 141 MMHG | TEMPERATURE: 97.1 F | RESPIRATION RATE: 16 BRPM | HEART RATE: 83 BPM | WEIGHT: 104.5 LBS

## 2018-04-11 PROCEDURE — 96413 CHEMO IV INFUSION 1 HR: CPT

## 2018-04-11 RX ADMIN — SODIUM CHLORIDE 20 ML/HR: 0.9 INJECTION, SOLUTION INTRAVENOUS at 08:43

## 2018-04-11 RX ADMIN — ATEZOLIZUMAB 1200 MG: 1200 INJECTION, SOLUTION INTRAVENOUS at 09:17

## 2018-04-11 NOTE — PROGRESS NOTES
Pt  Denies new symptoms or concerns at this time  Labs reviewed and within normal parameters for Tencentriq ordered today

## 2018-04-11 NOTE — PLAN OF CARE
Problem: INFECTION - ADULT  Goal: Absence or prevention of progression during hospitalization  INTERVENTIONS:  - Assess and monitor for signs and symptoms of infection  - Monitor lab/diagnostic results  - Monitor all insertion sites, i e  indwelling lines, tubes, and drains  - Monitor endotracheal (as able) and nasal secretions for changes in amount and color  - [place] appropriate cooling/warming therapies per order  - Administer medications as ordered  - Instruct and encourage patient and family to use good hand hygiene technique  - Identify and instruct in appropriate isolation precautions for identified infection/condition  Outcome: Progressing    Goal: Absence of fever/infection during neutropenic period  INTERVENTIONS:  - Monitor WBC  - Implement neutropenic guidelines  Outcome: Progressing      Problem: Knowledge Deficit  Goal: Patient/family/caregiver demonstrates understanding of disease process, treatment plan, medications, and discharge instructions  Complete learning assessment and assess knowledge base    Interventions:  - Provide teaching at level of understanding  - Provide teaching via preferred learning methods  Outcome: Progressing

## 2018-04-11 NOTE — PROGRESS NOTES
Spoke with Tano Pedersen RN and discussed nail assessment  Crystal verbalized will discuss with Dr José Bojorquez and talk with patient

## 2018-04-11 NOTE — TELEPHONE ENCOUNTER
Spoke with patient regarding her nails  She reports they are white and some are "lifting"  She will continue to monitor - they are not painful or infected at the moment    She will call with any additional questions or concerns

## 2018-04-11 NOTE — PROGRESS NOTES
Pt  Verbalized problem with nailbed while stopping Tecentriq today  It appears nail growth continues, but nails are lifting at distal portion  Pt  Denies discomfort at this time, but when nails catch on anything she can feel the pulling  RN recommended cutting her nails shorter; notified the office of Dr Henrietta Khan

## 2018-04-16 ENCOUNTER — TELEPHONE (OUTPATIENT)
Dept: HEMATOLOGY ONCOLOGY | Facility: CLINIC | Age: 78
End: 2018-04-16

## 2018-04-16 NOTE — TELEPHONE ENCOUNTER
Adrianne Points calls requesting to speak with Sherie  She has a CT of CAP on Wednesday  She explained that this weekend she had a "lump" on her shoulder that hurt when her bra strap hit it, so she did not wear bra and covered it with a gauze  It broke open--thinks it is some kind of skin infection  Asking if this will affect the Ct  Advised her to have her PCP check this wound/infection and that it would be OK to have Ct   She would still like to talk to Shon about it first

## 2018-04-16 NOTE — TELEPHONE ENCOUNTER
Patient will have open sore looked at by PCP - she described this as a hard painful lump that opened and is now draining  Reviewed S/S of infection  She reports it is red and painful with drainage    OK for her to proceed with CT scan as scheduled for Wednesday

## 2018-04-18 ENCOUNTER — HOSPITAL ENCOUNTER (OUTPATIENT)
Dept: CT IMAGING | Facility: HOSPITAL | Age: 78
Discharge: HOME | End: 2018-04-18
Attending: INTERNAL MEDICINE
Payer: COMMERCIAL

## 2018-04-18 DIAGNOSIS — C34.90 LUNG CANCER METASTATIC TO BONE (HCC): ICD-10-CM

## 2018-04-18 DIAGNOSIS — C79.51 LUNG CANCER METASTATIC TO BONE (HCC): ICD-10-CM

## 2018-04-18 PROCEDURE — 74177 CT ABD & PELVIS W/CONTRAST: CPT

## 2018-04-18 PROCEDURE — 71260 CT THORAX DX C+: CPT

## 2018-04-18 RX ADMIN — IOHEXOL 80 ML: 350 INJECTION, SOLUTION INTRAVENOUS at 08:41

## 2018-04-25 ENCOUNTER — OFFICE VISIT (OUTPATIENT)
Dept: HEMATOLOGY ONCOLOGY | Facility: CLINIC | Age: 78
End: 2018-04-25
Payer: COMMERCIAL

## 2018-04-25 VITALS
TEMPERATURE: 98.8 F | HEART RATE: 77 BPM | DIASTOLIC BLOOD PRESSURE: 88 MMHG | HEIGHT: 63 IN | WEIGHT: 108 LBS | OXYGEN SATURATION: 98 % | BODY MASS INDEX: 19.14 KG/M2 | SYSTOLIC BLOOD PRESSURE: 152 MMHG | RESPIRATION RATE: 16 BRPM

## 2018-04-25 DIAGNOSIS — C79.51 LUNG CANCER METASTATIC TO BONE (HCC): Primary | ICD-10-CM

## 2018-04-25 DIAGNOSIS — C34.90 LUNG CANCER METASTATIC TO BONE (HCC): Primary | ICD-10-CM

## 2018-04-25 PROCEDURE — 99215 OFFICE O/P EST HI 40 MIN: CPT | Performed by: INTERNAL MEDICINE

## 2018-04-25 NOTE — LETTER
April 25, 2018     Deidre Espinal MD  [address] [place] [id number]    Patient: Mayte Mensah   YOB: 1940   Date of Visit: 4/25/2018       Dear Dr Washington Money: Thank you for referring Mayte Mensah to me for evaluation  Below are my notes for this consultation  If you have questions, please do not hesitate to call me  I look forward to following your patient along with you  Sincerely,        Joe Jonas DO        CC: No Recipients  Joe Jonas DO  4/25/2018  4:23 PM  Sign at close encounter  Mayte Mensah  1940  [address] [facility]  [facility] [place] [address]    Chief Complaint   Patient presents with    Follow-up       Oncology History                 Lung cancer metastatic to bone [facility])    6/30/2017 Initial Diagnosis     Lung cancer metastatic to bone [facility])   June 2017-patient presented to the emergency room with lower extremity edema and redness  Additionally, she was found to have a right pleural effusion  CT chest, abdomen, pelvis showed right hilar mass, 3 2 cm mediastinal lymph nodes up to 15 mm, subcarinal 17 mm  Indeterminate left adrenal nodule 13 mm, moderate to large right pleural effusion  Moderate to large abdominal ascites  No bone lesions noted  June 7, 2017- bronchoscopy showed a right bronchus lesion  Biopsy was consistent with a non-small cell lung carcinoma, favor squamous cell carcinoma  7/27/2017 - 11/29/2017 Chemotherapy      Abraxane 80 milligrams/meter squared day 1, 8, 15, carbo AUC -5, day 1 only, Q 21 days  12/1/2017 Progression      Progressive disease noted by radiography and physical exam, left inferior cervical lymph node mass  12/6/2017 -  Chemotherapy       Tecentriq 1200 mg IV Q 3 weeks initiated  History of Present Illness:  -No ref   provider found:  -Previous Therapy (if not listed in Oncology History):  -Current Therapy (if not listed in Oncology History):  -Disease Status:  -Interval History:  -Tumor Markers:    Review of Systems:  Review of Systems   Constitutional: Negative for appetite change, diaphoresis, fatigue and fever  HENT: Negative for sinus pain  Eyes: Negative for discharge  Respiratory: Negative for cough and shortness of breath  Cardiovascular: Negative for chest pain  Gastrointestinal: Negative for abdominal pain, constipation and diarrhea  Endocrine: Negative for cold intolerance  Genitourinary: Negative for difficulty urinating and hematuria  Musculoskeletal: Negative for joint swelling  Skin: Negative for rash  Allergic/Immunologic: Negative for environmental allergies  Neurological: Negative for dizziness and headaches  Hematological: Negative for adenopathy  Psychiatric/Behavioral: Negative for agitation         Patient Active Problem List   Diagnosis    Cellulitis    Pleural effusion    Current smoker on some days    Hyponatremia    Hilar mass    Lung cancer metastatic [facility] bone [facility])     Past Medical History:   Diagnosis Date    Anxiety     Difficulty chewing     Difficulty swallowing     Edema leg     history of bilat LE edema bilat    Full dentures     History of cancer chemotherapy     most recent treatment 10/18 and sched to receive  once a  week till Dec    Hypertension     Hyponatremia     Leukocytosis     Lump     "more on right side" of tongue    Lump     left buttock/biopsy last week    Lung cancer [facility] [address] )     right    RODRIGO (mycobacterium avium-intracellulare) ([facility] [address] )     pt and spouse unsure    Pleural effusion     pt and spouse unsure    Productive cough     occas, light yellow thick    Shortness of breath     occas    Shoulder blade pain     left    Tongue pain     Wears glasses      Past Surgical History:   Procedure Laterality Date    ADENOIDECTOMY      APPENDECTOMY      BRONCHOSCOPY N/A 6/7/2017    Procedure: BRONCHOSCOPY FLEXIBLE with endobronchial washings, brush biopsy to the bronchus intermedius and right lower lobe bronchus  FNA of the level VII lymph node, multiple passes  Forceps biopsies to the bronchus intermedius  ;  Surgeon: Nisha Tena MD;  [facility]; Service:     VA EXCIS TONGUE LESN,POST 1/3 Right 11/7/2017    Procedure: EXCISIONAL BIOPSY TONGUE MASS;  Surgeon: Gary Cardoso DO;  [facility];  Service: ENT    SKIN BIOPSY Left     buttock    TONSILLECTOMY      UTERINE SUSPENSION       No family history on file  Social History     Social History    Marital status: /Civil Union     Spouse name: N/A    Number of children: N/A    Years of education: N/A     Occupational History    Not on file       Social History Main Topics    Smoking status: Former Smoker     Packs/day: 0 50     Years: 15 00     Quit date: 05/2017    Smokeless tobacco: Never Used      Comment: quit cold turkey    Alcohol use Yes      Comment: Socially    Drug use: No    Sexual activity: Not on file     Other Topics Concern    Not on file     Social History Narrative    No narrative on file       Current Outpatient Prescriptions:     ALPRAZolam (XANAX) 0 25 mg tablet, Take 0 25 mg by mouth 4 (four) times a day as needed for anxiety Take 1/2 tab , Disp: , Rfl:     Atezolizumab (TECENTRIQ IV), Infuse into a venous catheter, Disp: , Rfl:     calcium polycarbophil (FIBERCON) 625 mg tablet, Take 625 mg by mouth daily, Disp: , Rfl:     metoprolol succinate (TOPROL-XL) 25 mg 24 hr tablet, Take 12 5 mg by mouth daily  , Disp: , Rfl:     oxyCODONE-acetaminophen (PERCOCET) 5-325 mg per tablet, Take as directed at home, Disp: , Rfl: 0    psyllium (METAMUCIL) 0 52 g capsule, Take by mouth, Disp: , Rfl:     traMADol (ULTRAM) 50 mg tablet, Take 50 mg by mouth every 6 (six) hours as needed for moderate pain, Disp: , Rfl:     zolpidem (AMBIEN) 5 mg tablet, Take 5 mg by mouth daily at bedtime as needed for sleep, Disp: , Rfl:     predniSONE 20 mg tablet, TAKE 1 TABLET DAILY IN AM WITH FOOD FOR APPETITE , Disp: , Rfl: 0  No Known Allergies  Vitals:    04/25/18 1546   BP: 152/88   Pulse: 77   Resp: 16   Temp: 98 8 °F (37 1 °C)   SpO2: 98%         Physical Exam   Constitutional: She is oriented to person, place, and time  She appears well-developed  HENT:   Head: Normocephalic  Eyes: Pupils are equal, round, and reactive to light  Neck: Neck supple  Cardiovascular: Normal rate  No murmur heard  Pulmonary/Chest: No respiratory distress  She has no wheezes  She has no rales  Abdominal: Soft  She exhibits no distension  There is no tenderness  There is no rebound  Musculoskeletal: She exhibits no edema  Lymphadenopathy:     She has no cervical adenopathy  Neurological: She is alert and oriented to person, place, and time  She displays normal reflexes  Skin: Skin is warm  No rash noted  Psychiatric: She has a normal mood and affect  Thought content normal            Performance Status: ECOG/Zubrod/WHO: 1 - Symptomatic but completely ambulatory    Labs:  CBC, Coags, BMP, Mg, Phos     Imaging  Ct Chest Abdomen Pelvis W Contrast    Result Date: 4/19/2018  Narrative: CT CHEST, ABDOMEN AND PELVIS WITH IV CONTRAST INDICATION:   History of lung cancer with osseous metastases  Excerpt from oncology note 3/14/2018:""this is a 70-year-old female history of 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  ""chronic low back pain""PET-CT showed no evidence of activity in the lumbar spine " COMPARISON: PET CT 1/25/2018  CT chest abdomen pelvis 6-17  TECHNIQUE: CT examination of the chest, abdomen and pelvis was performed  Axial, sagittal, and coronal 2D reformatted images were created from the source data and submitted for interpretation  Radiation dose length product (DLP) for this visit:  518 mGy-cm     This examination, like all CT scans performed in the [facility], was performed utilizing techniques to minimize radiation dose exposure, including the use of iterative reconstruction and automated exposure control  IV Contrast:  80 mL of iohexol (OMNIPAQUE)   350 Multi-dose Enteric Contrast: Enteric contrast was administered  FINDINGS: CHEST LUNGS:  Spiculated right lower lobe mass abutting the posterior pleural measuring 2 8 x 2 7 cm unchanged from the most recent PET scan  No new pulmonary findings  Stable moderate centrilobular pulmonary emphysema  Right lower lobe pulmonary scarring  PLEURA:  Unremarkable  HEART/GREAT VESSELS:  Unremarkable for patient's age  MEDIASTINUM AND SHIRLEY:  Subcarinal lymph node now measures 18 x 18 mm and is hypermetabolic on the PET scan  CHEST WALL AND LOWER NECK:   A definite lesion is not identified on CT scan at the area of right paraspinal muscular uptake ABDOMEN LIVER/BILIARY TREE:  Mild chronic right sided intrahepatic biliary dilation  Chronic hepatic deformity secondary to overlying chronic rib deformities  This deformity likely accounts for the biliary dilation secondary to mass effect  GALLBLADDER:  No calcified gallstones  No pericholecystic inflammatory change  SPLEEN:  Unremarkable  PANCREAS:  Unremarkable  ADRENAL GLANDS:  Unremarkable  KIDNEYS/URETERS:  Unremarkable  No hydronephrosis  STOMACH AND BOWEL:  Prominent colonic stool without obstruction  APPENDIX:  No findings to suggest appendicitis  ABDOMINOPELVIC CAVITY:  No ascites or free intraperitoneal air  No lymphadenopathy  VESSELS:  Unremarkable for patient's age  PELVIS REPRODUCTIVE ORGANS:  Unremarkable for patient's age  URINARY BLADDER:  Unremarkable  ABDOMINAL WALL/INGUINAL REGIONS:  Unremarkable  A slightly hypermetabolic left gluteal subcutaneous lesion is excluded from the imaging field  OSSEOUS STRUCTURES:  Subtle stable sclerotic area in the lateral left 7th rib measuring 11 mm  Sclerotic areas in the T2 and T3 vertebral bodies in keeping with metastatic disease, unchanged from the prior studies       Impression: Spiculated right lower lobe mass abutting the posterior pleural measuring 2 8 x 2 7 cm unchanged from the most recent PET scan  Stable 18 mm subcarinal lymph node  No new pulmonary findings  Subtle stable sclerotic area in the lateral left 7th rib measuring 11 mm  Sclerotic areas in the T2 and T3 vertebral bodies in keeping with metastatic disease, unchanged from the prior studies  Previously seen left gluteal lesion is not included in the imaging field  No metastatic disease to the abdomen  Workstation performed: [id number]     I reviewed the above laboratory and imaging data  Discussion/Summary: In summary, this is a 74-year-old female history of squamous cell carcinoma of the lung  She has been on Tecentriq since December 2017  Recent CT chest abdomen pelvis shows stable right lower lobe pulmonary nodule, subcarinal node, 1 8 cm, scattered oligo bone metastases  All of these findings are stable compared to her previous PET scan in January 2018  CBC and chemistry are normal   TSH likewise  Continuation of treatment is recommended  The patient and her  had questions about potential options for treatment when her disease is progressive  Certainly a reasonable standard option would include Taxotere and Cyramza  Hopefully we will have trials in the post immunotherapy setting as this is An area of on MET need at this time  Additionally, we reviewed potential toxicities including but not limited to hypothyroidism, eczematoid rash, diarrhea /colitis  I asked him to call if any questions or problems arise  She had what sounds like a furuncle on the right shoulder  This has opened, drained, and now is healing  Etiology unclear  I suspect this is not related to her immunotherapy but could not rule out that possibility  Observation is favored  I reviewed the above considerations at length with the patient and her   They voiced understanding and agreement

## 2018-04-25 NOTE — PROGRESS NOTES
Mayte Mensah  1940  [address] [facility]  [facility] [address] [address] [address]    Chief Complaint   Patient presents with    Follow-up       Oncology History                 Lung cancer metastatic to bone [facility])    6/30/2017 Initial Diagnosis     Lung cancer metastatic to bone [facility])   June 2017-patient presented to the emergency room with lower extremity edema and redness  Additionally, she was found to have a right pleural effusion  CT chest, abdomen, pelvis showed right hilar mass, 3 2 cm mediastinal lymph nodes up to 15 mm, subcarinal 17 mm  Indeterminate left adrenal nodule 13 mm, moderate to large right pleural effusion  Moderate to large abdominal ascites  No bone lesions noted  June 7, 2017- bronchoscopy showed a right bronchus lesion  Biopsy was consistent with a non-small cell lung carcinoma, favor squamous cell carcinoma  7/27/2017 - 11/29/2017 Chemotherapy      Abraxane 80 milligrams/meter squared day 1, 8, 15, carbo AUC -5, day 1 only, Q 21 days  12/1/2017 Progression      Progressive disease noted by radiography and physical exam, left inferior cervical lymph node mass  12/6/2017 -  Chemotherapy       Tecentriq 1200 mg IV Q 3 weeks initiated  History of Present Illness:  -No ref  provider found:  -Previous Therapy (if not listed in Oncology History):  -Current Therapy (if not listed in Oncology History):  -Disease Status:  -Interval History:  -Tumor Markers:    Review of Systems:  Review of Systems   Constitutional: Negative for appetite change, diaphoresis, fatigue and fever  HENT: Negative for sinus pain  Eyes: Negative for discharge  Respiratory: Negative for cough and shortness of breath  Cardiovascular: Negative for chest pain  Gastrointestinal: Negative for abdominal pain, constipation and diarrhea  Endocrine: Negative for cold intolerance     Genitourinary: Negative for difficulty urinating and hematuria  Musculoskeletal: Negative for joint swelling  Skin: Negative for rash  Allergic/Immunologic: Negative for environmental allergies  Neurological: Negative for dizziness and headaches  Hematological: Negative for adenopathy  Psychiatric/Behavioral: Negative for agitation  Patient Active Problem List   Diagnosis    Cellulitis    Pleural effusion    Current smoker on some days    Hyponatremia    Hilar mass    Lung cancer metastatic to bone [facility])     Past Medical History:   Diagnosis Date    Anxiety     Difficulty chewing     Difficulty swallowing     Edema leg     history of bilat LE edema bilat    Full dentures     History of cancer chemotherapy     most recent treatment 10/18 and sched to receive  once a  week till Dec    Hypertension     Hyponatremia     Leukocytosis     Lump     "more on right side" of tongue    Lump     left buttock/biopsy last week    Lung cancer ([facility] [address] )     right    RODRIGO (mycobacterium avium-intracellulare) ([facility] [address] )     pt and spouse unsure    Pleural effusion     pt and spouse unsure    Productive cough     occas, light yellow thick    Shortness of breath     occas    Shoulder blade pain     left    Tongue pain     Wears glasses      Past Surgical History:   Procedure Laterality Date    ADENOIDECTOMY      APPENDECTOMY      BRONCHOSCOPY N/A 6/7/2017    Procedure: BRONCHOSCOPY FLEXIBLE with endobronchial washings, brush biopsy to the bronchus intermedius and right lower lobe bronchus  FNA of the level VII lymph node, multiple passes  Forceps biopsies to the bronchus intermedius  ;  Surgeon: Sheeba Rankin MD;  [facility]: [facility]; Service:     MELISA OLGUIN TONGUE BERTHA,POST 1/3 Right 11/7/2017    Procedure: EXCISIONAL BIOPSY TONGUE MASS;  Surgeon: Shekhar Bull DO;  [facility];  Service: ENT    SKIN BIOPSY Left     buttock    TONSILLECTOMY      UTERINE SUSPENSION       No family history on file    Social History Social History    Marital status: /Civil Union     Spouse name: N/A    Number of children: N/A    Years of education: N/A     Occupational History    Not on file  Social History Main Topics    Smoking status: Former Smoker     Packs/day: 0 50     Years: 15 00     Quit date: 05/2017    Smokeless tobacco: Never Used      Comment: quit cold turkey    Alcohol use Yes      Comment: Socially    Drug use: No    Sexual activity: Not on file     Other Topics Concern    Not on file     Social History Narrative    No narrative on file       Current Outpatient Prescriptions:     ALPRAZolam (XANAX) 0 25 mg tablet, Take 0 25 mg by mouth 4 (four) times a day as needed for anxiety Take 1/2 tab , Disp: , Rfl:     Atezolizumab (TECENTRIQ IV), Infuse into a venous catheter, Disp: , Rfl:     calcium polycarbophil (FIBERCON) 625 mg tablet, Take 625 mg by mouth daily, Disp: , Rfl:     metoprolol succinate (TOPROL-XL) 25 mg 24 hr tablet, Take 12 5 mg by mouth daily  , Disp: , Rfl:     oxyCODONE-acetaminophen (PERCOCET) 5-325 mg per tablet, Take as directed at home, Disp: , Rfl: 0    psyllium (METAMUCIL) 0 52 g capsule, Take by mouth, Disp: , Rfl:     traMADol (ULTRAM) 50 mg tablet, Take 50 mg by mouth every 6 (six) hours as needed for moderate pain, Disp: , Rfl:     zolpidem (AMBIEN) 5 mg tablet, Take 5 mg by mouth daily at bedtime as needed for sleep, Disp: , Rfl:     predniSONE 20 mg tablet, TAKE 1 TABLET DAILY IN AM WITH FOOD FOR APPETITE , Disp: , Rfl: 0  No Known Allergies  Vitals:    04/25/18 1546   BP: 152/88   Pulse: 77   Resp: 16   Temp: 98 8 °F (37 1 °C)   SpO2: 98%         Physical Exam   Constitutional: She is oriented to person, place, and time  She appears well-developed  HENT:   Head: Normocephalic  Eyes: Pupils are equal, round, and reactive to light  Neck: Neck supple  Cardiovascular: Normal rate  No murmur heard  Pulmonary/Chest: No respiratory distress  She has no wheezes  She has no rales  Abdominal: Soft  She exhibits no distension  There is no tenderness  There is no rebound  Musculoskeletal: She exhibits no edema  Lymphadenopathy:     She has no cervical adenopathy  Neurological: She is alert and oriented to person, place, and time  She displays normal reflexes  Skin: Skin is warm  No rash noted  Psychiatric: She has a normal mood and affect  Thought content normal            Performance Status: ECOG/Zubrod/WHO: 1 - Symptomatic but completely ambulatory    Labs:  CBC, Coags, BMP, Mg, Phos     Imaging  Ct Chest Abdomen Pelvis W Contrast    Result Date: 4/19/2018  Narrative: CT CHEST, ABDOMEN AND PELVIS WITH IV CONTRAST INDICATION:   History of lung cancer with osseous metastases  Excerpt from oncology note 3/14/2018:""this is a 29-year-old female history of 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  ""chronic low back pain""PET-CT showed no evidence of activity in the lumbar spine " COMPARISON: PET CT 1/25/2018  CT chest abdomen pelvis 6-17  TECHNIQUE: CT examination of the chest, abdomen and pelvis was performed  Axial, sagittal, and coronal 2D reformatted images were created from the source data and submitted for interpretation  Radiation dose length product (DLP) for this visit:  518 mGy-cm   This examination, like all CT scans performed in the [facility], was performed utilizing techniques to minimize radiation dose exposure, including the use of iterative reconstruction and automated exposure control  IV Contrast:  80 mL of iohexol (OMNIPAQUE)   350 Multi-dose Enteric Contrast: Enteric contrast was administered  FINDINGS: CHEST LUNGS:  Spiculated right lower lobe mass abutting the posterior pleural measuring 2 8 x 2 7 cm unchanged from the most recent PET scan  No new pulmonary findings  Stable moderate centrilobular pulmonary emphysema  Right lower lobe pulmonary scarring  PLEURA:  Unremarkable   HEART/GREAT VESSELS:  Unremarkable for patient's age  MEDIASTINUM AND SHIRLEY:  Subcarinal lymph node now measures 18 x 18 mm and is hypermetabolic on the PET scan  CHEST WALL AND LOWER NECK:   A definite lesion is not identified on CT scan at the area of right paraspinal muscular uptake ABDOMEN LIVER/BILIARY TREE:  Mild chronic right sided intrahepatic biliary dilation  Chronic hepatic deformity secondary to overlying chronic rib deformities  This deformity likely accounts for the biliary dilation secondary to mass effect  GALLBLADDER:  No calcified gallstones  No pericholecystic inflammatory change  SPLEEN:  Unremarkable  PANCREAS:  Unremarkable  ADRENAL GLANDS:  Unremarkable  KIDNEYS/URETERS:  Unremarkable  No hydronephrosis  STOMACH AND BOWEL:  Prominent colonic stool without obstruction  APPENDIX:  No findings to suggest appendicitis  ABDOMINOPELVIC CAVITY:  No ascites or free intraperitoneal air  No lymphadenopathy  VESSELS:  Unremarkable for patient's age  PELVIS REPRODUCTIVE ORGANS:  Unremarkable for patient's age  URINARY BLADDER:  Unremarkable  ABDOMINAL WALL/INGUINAL REGIONS:  Unremarkable  A slightly hypermetabolic left gluteal subcutaneous lesion is excluded from the imaging field  OSSEOUS STRUCTURES:  Subtle stable sclerotic area in the lateral left 7th rib measuring 11 mm  Sclerotic areas in the T2 and T3 vertebral bodies in keeping with metastatic disease, unchanged from the prior studies  Impression: Spiculated right lower lobe mass abutting the posterior pleural measuring 2 8 x 2 7 cm unchanged from the most recent PET scan  Stable 18 mm subcarinal lymph node  No new pulmonary findings  Subtle stable sclerotic area in the lateral left 7th rib measuring 11 mm  Sclerotic areas in the T2 and T3 vertebral bodies in keeping with metastatic disease, unchanged from the prior studies  Previously seen left gluteal lesion is not included in the imaging field  No metastatic disease to the abdomen   Workstation performed: [id number]     I reviewed the above laboratory and imaging data  Discussion/Summary: In summary, this is a 53-year-old female history of squamous cell carcinoma of the lung  She has been on Tecentriq since December 2017  Recent CT chest abdomen pelvis shows stable right lower lobe pulmonary nodule, subcarinal node, 1 8 cm, scattered oligo bone metastases  All of these findings are stable compared to her previous PET scan in January 2018  CBC and chemistry are normal   TSH likewise  Continuation of treatment is recommended  The patient and her  had questions about potential options for treatment when her disease is progressive  Certainly a reasonable standard option would include Taxotere and Cyramza  Hopefully we will have trials in the post immunotherapy setting as this is An area of on MET need at this time  Additionally, we reviewed potential toxicities including but not limited to hypothyroidism, eczematoid rash, diarrhea /colitis  I asked him to call if any questions or problems arise  She had what sounds like a furuncle on the right shoulder  This has opened, drained, and now is healing  Etiology unclear  I suspect this is not related to her immunotherapy but could not rule out that possibility  Observation is favored  I reviewed the above considerations at length with the patient and her   They voiced understanding and agreement

## 2018-04-30 ENCOUNTER — APPOINTMENT (OUTPATIENT)
Dept: LAB | Facility: CLINIC | Age: 78
End: 2018-04-30
Payer: COMMERCIAL

## 2018-04-30 ENCOUNTER — TRANSCRIBE ORDERS (OUTPATIENT)
Dept: LAB | Facility: CLINIC | Age: 78
End: 2018-04-30

## 2018-04-30 DIAGNOSIS — C34.90 LUNG CANCER METASTATIC TO BONE (HCC): ICD-10-CM

## 2018-04-30 DIAGNOSIS — C79.51 LUNG CANCER METASTATIC TO BONE (HCC): ICD-10-CM

## 2018-04-30 LAB
BASOPHILS # BLD AUTO: 0.06 THOUSANDS/ΜL (ref 0–0.1)
BASOPHILS NFR BLD AUTO: 1 % (ref 0–1)
EOSINOPHIL # BLD AUTO: 0.15 THOUSAND/ΜL (ref 0–0.61)
EOSINOPHIL NFR BLD AUTO: 3 % (ref 0–6)
ERYTHROCYTE [DISTWIDTH] IN BLOOD BY AUTOMATED COUNT: 14.7 % (ref 11.6–15.1)
HCT VFR BLD AUTO: 41.4 % (ref 34.8–46.1)
HGB BLD-MCNC: 13 G/DL (ref 11.5–15.4)
LYMPHOCYTES # BLD AUTO: 1.43 THOUSANDS/ΜL (ref 0.6–4.47)
LYMPHOCYTES NFR BLD AUTO: 25 % (ref 14–44)
MCH RBC QN AUTO: 30.3 PG (ref 26.8–34.3)
MCHC RBC AUTO-ENTMCNC: 31.4 G/DL (ref 31.4–37.4)
MCV RBC AUTO: 97 FL (ref 82–98)
MONOCYTES # BLD AUTO: 0.57 THOUSAND/ΜL (ref 0.17–1.22)
MONOCYTES NFR BLD AUTO: 10 % (ref 4–12)
NEUTROPHILS # BLD AUTO: 3.55 THOUSANDS/ΜL (ref 1.85–7.62)
NEUTS SEG NFR BLD AUTO: 61 % (ref 43–75)
NRBC BLD AUTO-RTO: 0 /100 WBCS
PLATELET # BLD AUTO: 368 THOUSANDS/UL (ref 149–390)
PMV BLD AUTO: 8.9 FL (ref 8.9–12.7)
RBC # BLD AUTO: 4.29 MILLION/UL (ref 3.81–5.12)
WBC # BLD AUTO: 5.77 THOUSAND/UL (ref 4.31–10.16)

## 2018-04-30 PROCEDURE — 85025 COMPLETE CBC W/AUTO DIFF WBC: CPT

## 2018-05-01 RX ORDER — SODIUM CHLORIDE 9 MG/ML
20 INJECTION, SOLUTION INTRAVENOUS CONTINUOUS
Status: DISCONTINUED | OUTPATIENT
Start: 2018-05-02 | End: 2018-05-05 | Stop reason: HOSPADM

## 2018-05-02 ENCOUNTER — HOSPITAL ENCOUNTER (OUTPATIENT)
Dept: INFUSION CENTER | Facility: CLINIC | Age: 78
Discharge: HOME | End: 2018-05-02
Payer: COMMERCIAL

## 2018-05-02 VITALS
BODY MASS INDEX: 18.29 KG/M2 | TEMPERATURE: 97.6 F | DIASTOLIC BLOOD PRESSURE: 81 MMHG | WEIGHT: 107.14 LBS | SYSTOLIC BLOOD PRESSURE: 167 MMHG | HEIGHT: 64 IN | HEART RATE: 81 BPM | RESPIRATION RATE: 16 BRPM

## 2018-05-02 PROCEDURE — 96413 CHEMO IV INFUSION 1 HR: CPT

## 2018-05-02 RX ADMIN — ATEZOLIZUMAB 1200 MG: 1200 INJECTION, SOLUTION INTRAVENOUS at 08:59

## 2018-05-02 RX ADMIN — SODIUM CHLORIDE 20 ML/HR: 0.9 INJECTION, SOLUTION INTRAVENOUS at 08:41

## 2018-05-02 NOTE — PLAN OF CARE
Problem: Potential for Falls  Goal: Patient will remain free of falls  INTERVENTIONS:  - Assess patient frequently for physical needs  -  Identify cognitive and physical deficits and behaviors that affect risk of falls    -  [place] fall precautions as indicated by assessment   - Educate patient/family on patient safety including physical limitations  - Instruct patient to call for assistance with activity based on assessment  - Modify environment to reduce risk of injury  - Consider OT/PT consult to assist with strengthening/mobility   Outcome: Progressing

## 2018-05-21 ENCOUNTER — APPOINTMENT (OUTPATIENT)
Dept: LAB | Facility: CLINIC | Age: 78
End: 2018-05-21
Payer: COMMERCIAL

## 2018-05-21 LAB — TSH SERPL DL<=0.05 MIU/L-ACNC: 1.72 UIU/ML (ref 0.36–3.74)

## 2018-05-21 PROCEDURE — 84443 ASSAY THYROID STIM HORMONE: CPT

## 2018-05-22 RX ORDER — SODIUM CHLORIDE 9 MG/ML
20 INJECTION, SOLUTION INTRAVENOUS CONTINUOUS
Status: DISCONTINUED | OUTPATIENT
Start: 2018-05-23 | End: 2018-05-26 | Stop reason: HOSPADM

## 2018-05-23 ENCOUNTER — HOSPITAL ENCOUNTER (OUTPATIENT)
Dept: INFUSION CENTER | Facility: CLINIC | Age: 78
Discharge: HOME | End: 2018-05-23
Payer: COMMERCIAL

## 2018-05-23 VITALS
HEART RATE: 80 BPM | TEMPERATURE: 98.6 F | WEIGHT: 110 LBS | SYSTOLIC BLOOD PRESSURE: 166 MMHG | BODY MASS INDEX: 18.9 KG/M2 | RESPIRATION RATE: 18 BRPM | DIASTOLIC BLOOD PRESSURE: 94 MMHG

## 2018-05-23 PROCEDURE — 96413 CHEMO IV INFUSION 1 HR: CPT

## 2018-05-23 RX ADMIN — ATEZOLIZUMAB 1200 MG: 1200 INJECTION, SOLUTION INTRAVENOUS at 09:31

## 2018-05-23 RX ADMIN — SODIUM CHLORIDE 20 ML/HR: 0.9 INJECTION, SOLUTION INTRAVENOUS at 09:14

## 2018-05-24 ENCOUNTER — OFFICE VISIT (OUTPATIENT)
Dept: FAMILY MEDICINE CLINIC | Facility: CLINIC | Age: 78
End: 2018-05-24
Payer: COMMERCIAL

## 2018-05-24 VITALS
WEIGHT: 109 LBS | SYSTOLIC BLOOD PRESSURE: 166 MMHG | HEART RATE: 88 BPM | DIASTOLIC BLOOD PRESSURE: 80 MMHG | BODY MASS INDEX: 18.72 KG/M2

## 2018-05-24 DIAGNOSIS — C34.90 LUNG CANCER METASTATIC TO BONE (HCC): Primary | ICD-10-CM

## 2018-05-24 DIAGNOSIS — E87.1 HYPONATREMIA: ICD-10-CM

## 2018-05-24 DIAGNOSIS — C79.51 LUNG CANCER METASTATIC TO BONE (HCC): Primary | ICD-10-CM

## 2018-05-24 DIAGNOSIS — L03.113 CELLULITIS OF RIGHT UPPER EXTREMITY: ICD-10-CM

## 2018-05-24 DIAGNOSIS — I10 ESSENTIAL HYPERTENSION: ICD-10-CM

## 2018-05-24 PROBLEM — A31.0 MAI (MYCOBACTERIUM AVIUM-INTRACELLULARE) (HCC): Status: ACTIVE | Noted: 2017-07-05

## 2018-05-24 PROBLEM — C02.9: Status: ACTIVE | Noted: 2017-10-04

## 2018-05-24 PROBLEM — F17.200 CURRENT SMOKER ON SOME DAYS: Status: RESOLVED | Noted: 2017-06-01 | Resolved: 2018-05-24

## 2018-05-24 PROBLEM — F41.9 ANXIETY: Status: ACTIVE | Noted: 2017-10-04

## 2018-05-24 PROCEDURE — 1160F RVW MEDS BY RX/DR IN RCRD: CPT | Performed by: FAMILY MEDICINE

## 2018-05-24 PROCEDURE — 99214 OFFICE O/P EST MOD 30 MIN: CPT | Performed by: FAMILY MEDICINE

## 2018-05-24 RX ORDER — PREDNISONE 20 MG/1
20 TABLET ORAL DAILY
COMMUNITY
End: 2018-05-24

## 2018-05-24 RX ORDER — METOPROLOL SUCCINATE 25 MG/1
12.5 TABLET, EXTENDED RELEASE ORAL DAILY
Qty: 30 TABLET | Refills: 5 | Status: SHIPPED | OUTPATIENT
Start: 2018-05-24

## 2018-05-24 RX ORDER — CEFADROXIL 1000 MG/1
1 TABLET ORAL DAILY
Qty: 10 TABLET | Refills: 0 | Status: SHIPPED | OUTPATIENT
Start: 2018-05-24 | End: 2018-06-03

## 2018-05-24 NOTE — PROGRESS NOTES
Assessment/Plan:    Cellulitis  Patient does appear to have a cellulitis on the right shoulder  At this point, especially given that she is on an immune modifying agent, I would recommend antibiotics  Duricef 1 g daily for 10 days  Re-evaluate at the end of that  I elected not to go with topicals as there is a larger area of infection  Hypertension  Not at goal  Has been off medication  Renewed meds, and follow in future  Hyponatremia  Sodium level is normal on most recent check  No changes  Lung cancer metastatic to bone (HCC)  Stable  Follow with Hematology/Oncology  She is on immune modifying agents  Diagnoses and all orders for this visit:    Lung cancer metastatic to bone [facility])    Essential hypertension  -     metoprolol succinate (TOPROL-XL) 25 mg 24 hr tablet; Take 0 5 tablets (12 5 mg total) by mouth daily    Hyponatremia    Cellulitis of right upper extremity  -     cefadroxil (DURICEF) 1 g tablet; Take 1 tablet (1 g total) by mouth daily for 10 days    Other orders  -     Discontinue: predniSONE 20 mg tablet; Take 20 mg by mouth daily          Subjective: patient here for a medication check  ak     Patient ID: Jina Peacock is a 78 y o  female  Patient was due for a refill of medication, but she did not realize she did not have the refill  This was in large part because she is receiving chemotherapy for her metastatic lung carcinoma  She has been out of the medication for awhile  On further discussion, it appears she has been out of it for at least 2-3 months  She is following with Hematology/Oncology for this  Denies any problems at the moment  She is still having some confusion as a side effect of the chemo  Patient reports that she did have an abscess on her right shoulder, right where the bra strap goes across the shoulder  This drained in April, and continue draining for a bit  It had since resolved    Currently, she has increased redness at the area, and was unsure what to do about it  The following portions of the patient's history were reviewed and updated as appropriate: allergies, current medications, past family history, past medical history, past social history, past surgical history and problem list     Review of Systems   Constitutional: Negative  HENT: Negative  Respiratory: Negative  Cardiovascular: Negative  Skin:        Per HPI         CBC, CMP, TSH were all normal from May  Objective:      /80   Pulse 88   Wt 49 4 kg (109 lb)   BMI 18 72 kg/m²          Physical Exam   Constitutional: She appears well-developed and well-nourished  HENT:   Head: Normocephalic and atraumatic  Cardiovascular: Normal rate, regular rhythm and normal heart sounds  Pulses:       Carotid pulses are 2+ on the right side, and 2+ on the left side  Pulmonary/Chest: Effort normal and breath sounds normal  She has no wheezes  She has no rales  She exhibits no tenderness  Skin:        Nursing note and vitals reviewed

## 2018-05-24 NOTE — ASSESSMENT & PLAN NOTE
Patient does appear to have a cellulitis on the right shoulder  At this point, especially given that she is on an immune modifying agent, I would recommend antibiotics  Duricef 1 g daily for 10 days  Re-evaluate at the end of that  I elected not to go with topicals as there is a larger area of infection

## 2018-05-24 NOTE — PATIENT INSTRUCTIONS
Problem List Items Addressed This Visit        Respiratory    Lung cancer metastatic to bone ([facility] [address] ) - Primary     Stable  Follow with Hematology/Oncology  She is on immune modifying agents  Cardiovascular and Mediastinum    Hypertension     Not at goal  Has been off medication  Renewed meds, and follow in future  Relevant Medications    metoprolol succinate (TOPROL-XL) 25 mg 24 hr tablet       Other    Cellulitis     Patient does appear to have a cellulitis on the right shoulder  At this point, especially given that she is on an immune modifying agent, I would recommend antibiotics  Duricef 1 g daily for 10 days  Re-evaluate at the end of that  I elected not to go with topicals as there is a larger area of infection  Relevant Medications    cefadroxil (DURICEF) 1 g tablet    Hyponatremia     Sodium level is normal on most recent check  No changes

## 2018-06-06 ENCOUNTER — OFFICE VISIT (OUTPATIENT)
Dept: HEMATOLOGY ONCOLOGY | Facility: CLINIC | Age: 78
End: 2018-06-06
Payer: COMMERCIAL

## 2018-06-06 VITALS
TEMPERATURE: 97.9 F | DIASTOLIC BLOOD PRESSURE: 84 MMHG | RESPIRATION RATE: 17 BRPM | HEIGHT: 64 IN | WEIGHT: 108.5 LBS | HEART RATE: 69 BPM | SYSTOLIC BLOOD PRESSURE: 168 MMHG | OXYGEN SATURATION: 97 % | BODY MASS INDEX: 18.52 KG/M2

## 2018-06-06 DIAGNOSIS — C79.51 LUNG CANCER METASTATIC TO BONE (HCC): Primary | ICD-10-CM

## 2018-06-06 DIAGNOSIS — C34.90 LUNG CANCER METASTATIC TO BONE (HCC): Primary | ICD-10-CM

## 2018-06-06 PROCEDURE — 99215 OFFICE O/P EST HI 40 MIN: CPT | Performed by: INTERNAL MEDICINE

## 2018-06-06 PROCEDURE — 4040F PNEUMOC VAC/ADMIN/RCVD: CPT | Performed by: INTERNAL MEDICINE

## 2018-06-06 NOTE — PROGRESS NOTES
Jina Peacock  1940  [address] [facility]  [facility] [address] [address]    Chief Complaint   Patient presents with    Follow-up           Oncology History                 Lung cancer metastatic to bone [facility])    6/30/2017 Initial Diagnosis     Lung cancer metastatic to bone [facility])   June 2017-patient presented to the emergency room with lower extremity edema and redness  Additionally, she was found to have a right pleural effusion  CT chest, abdomen, pelvis showed right hilar mass, 3 2 cm mediastinal lymph nodes up to 15 mm, subcarinal 17 mm  Indeterminate left adrenal nodule 13 mm, moderate to large right pleural effusion  Moderate to large abdominal ascites  No bone lesions noted  June 7, 2017- bronchoscopy showed a right bronchus lesion  Biopsy was consistent with a non-small cell lung carcinoma, favor squamous cell carcinoma  7/27/2017 - 11/29/2017 Chemotherapy      Abraxane 80 milligrams/meter squared day 1, 8, 15, carbo AUC -5, day 1 only, Q 21 days  12/1/2017 Progression      Progressive disease noted by radiography and physical exam, left inferior cervical lymph node mass  12/6/2017 -  Chemotherapy       Tecentriq 1200 mg IV Q 3 weeks initiated  History of Present Illness:  -No ref  provider found:    -Interval History:      Review of Systems:  Review of Systems   Constitutional: Negative for appetite change, diaphoresis, fatigue and fever  HENT: Negative for sinus pain  Eyes: Negative for discharge  Respiratory: Negative for cough and shortness of breath  Cardiovascular: Negative for chest pain  Gastrointestinal: Negative for abdominal pain, constipation and diarrhea  Endocrine: Negative for cold intolerance  Genitourinary: Negative for difficulty urinating and hematuria  Musculoskeletal: Negative for joint swelling  Skin: Negative for rash     Allergic/Immunologic: Negative for environmental allergies  Neurological: Negative for dizziness and headaches  Hematological: Negative for adenopathy  Psychiatric/Behavioral: Negative for agitation  Patient Active Problem List   Diagnosis    Cellulitis    Pleural effusion    Hyponatremia    Hilar mass    [facility] cancer [facility] bone ([facility] )    Anxiety    Hypertension    RODRIGO (mycobacterium avium-intracellulare) ([facility] )    Tongue malignant neoplasm ([facility] )     Past Medical History:   Diagnosis Date    Anxiety     Cellulitis of both feet     Difficulty chewing     Difficulty swallowing     Edema leg     history of bilat LE edema bilat    Full dentures     History of cancer chemotherapy     most recent treatment 10/18 and sched to receive  once a  week till Dec    Hypertension     Hyponatremia     Leukocytosis     Lower leg edema     Lump     "more on right side" of tongue    Lump     left buttock/biopsy last week    Lung cancer ([facility] )     right    RODRIGO (mycobacterium avium-intracellulare) ([place] [address] )     pt and spouse unsure    Pleural effusion     pt and spouse unsure    Productive cough     occas, light yellow thick    Shortness of breath     occas    Shoulder blade pain     left    Tongue pain     Wears glasses      Past Surgical History:   Procedure Laterality Date    ADENOIDECTOMY      APPENDECTOMY      BRONCHOSCOPY N/A 6/7/2017    Procedure: BRONCHOSCOPY FLEXIBLE with endobronchial washings, brush biopsy to the bronchus intermedius and right lower lobe bronchus  FNA of the level VII lymph node, multiple passes  Forceps biopsies to the bronchus intermedius  ;  Surgeon: Sheeba Rankin MD;  Location: [facility];   Service:     PA CLAU TONGUE BERTHA,POST 1/3 Right 11/7/2017    Procedure: EXCISIONAL BIOPSY TONGUE MASS;  Surgeon: Shekhar Bull DO;  [facility];  Service: ENT    SKIN BIOPSY Left     buttock    TONSILLECTOMY      UTERINE SUSPENSION      VAGINA SURGERY      insertion of pessary ring, last assessed: 6/15/17     Family History   Problem Relation Age of Onset    Dementia Mother     Stroke Father      Social History     Social History    Marital status: /Civil Union     Spouse name: N/A    Number of children: N/A    Years of education: N/A     Occupational History    employed      Social History Main Topics    Smoking status: Former Smoker     Packs/day: 0 50     Years: 15 00     Quit date: 05/2017    Smokeless tobacco: Never Used      Comment: quit cold turkey    Alcohol use Yes      Comment: Socially    Drug use: No    Sexual activity: Not on file     Other Topics Concern    Not on file     Social History Narrative    No narrative on file       Current Outpatient Prescriptions:     ALPRAZolam (XANAX) 0 25 mg tablet, Take 0 25 mg by mouth 4 (four) times a day as needed for anxiety Take 1/2 tab , Disp: , Rfl:     Atezolizumab (TECENTRIQ IV), Infuse into a venous catheter, Disp: , Rfl:     metoprolol succinate (TOPROL-XL) 25 mg 24 hr tablet, Take 0 5 tablets (12 5 mg total) by mouth daily, Disp: 30 tablet, Rfl: 5    oxyCODONE-acetaminophen (PERCOCET) 5-325 mg per tablet, Take as directed at home, Disp: , Rfl: 0    psyllium (METAMUCIL) 0 52 g capsule, Take by mouth, Disp: , Rfl:     traMADol (ULTRAM) 50 mg tablet, Take 50 mg by mouth every 6 (six) hours as needed for moderate pain, Disp: , Rfl:     zolpidem (AMBIEN) 5 mg tablet, Take 5 mg by mouth daily at bedtime as needed for sleep, Disp: , Rfl:   No Known Allergies  Vitals:    06/06/18 1152   BP: 168/84   Pulse: 69   Resp: 17   Temp: 97 9 °F (36 6 °C)   SpO2: 97%         Physical Exam   Constitutional: She is oriented to person, place, and time  She appears well-developed  HENT:   Head: Normocephalic  Eyes: Pupils are equal, round, and reactive to light  Neck: Neck supple  Cardiovascular: Normal rate  No murmur heard  Pulmonary/Chest: No respiratory distress  She has no wheezes  She has no rales  Abdominal: Soft  She exhibits no distension  There is no tenderness  There is no rebound  Musculoskeletal: She exhibits no edema  Lymphadenopathy:     She has no cervical adenopathy  Neurological: She is alert and oriented to person, place, and time  She displays normal reflexes  Skin: Skin is warm  No rash noted  Psychiatric: She has a normal mood and affect  Thought content normal            Performance Status: ECOG/Zubrod/WHO: 1 - Symptomatic but completely ambulatory    Labs:  CBC, Coags, BMP, Mg, Phos     Imaging  No results found  I reviewed the above laboratory and imaging data  Discussion/Summary:  In summary, this is a 71-year-old female history of lung cancer currently on Tecentriq  She is doing fairly well  She tolerates this without difficulty  Appetite is good, weight is stable  She generally functions without restriction  Her right shoulder cutaneous lesion was recurring  She saw her PCP and was placed on antibiotics which she finished about 5 days ago  She has minor erythema in the area  Skin is intact  There is no fluctuance noted  Observation is favored for now  Restaging just prior to her next visit in 6 weeks is arranged  Most recent labs are normal including CMP, CBC, TSH  The patient had some questions about the optimal duration of treatment with immuno therapy drugs  We reviewed that this is not known  There are mechanistic reasons and certainly single patient observation is to suggest that limited duration of therapy might be reasonable  The general recommendation, for the moment, however, is to continue treatment until toxicity or disease progression  I reviewed the above with the patient and her   They voiced understanding and agreement

## 2018-06-06 NOTE — LETTER
June 6, 2018     Mikey Rankin MD  [place] [id number]  [place] [address] [address]    Patient: Jose Sanches   YOB: 1940   Date of Visit: 6/6/2018       Dear Dr Carter Marquez: Thank you for referring Jose Sanches to me for evaluation  Below are my notes for this consultation  If you have questions, please do not hesitate to call me  I look forward to following your patient along with you  Sincerely,        Yolanda Romo DO        CC: No Recipients  Yolanda Romo DO  6/6/2018 12:17 PM  Sign at close encounter  Jose Sanches  1940  [address] [place] [address]    Chief Complaint   Patient presents with    Follow-up           Oncology History                 Lung cancer metastatic to bone [facility])    6/30/2017 Initial Diagnosis     Lung cancer metastatic to bone [facility])   June 2017-patient presented to the emergency room with lower extremity edema and redness  Additionally, she was found to have a right pleural effusion  CT chest, abdomen, pelvis showed right hilar mass, 3 2 cm mediastinal lymph nodes up to 15 mm, subcarinal 17 mm  Indeterminate left adrenal nodule 13 mm, moderate to large right pleural effusion  Moderate to large abdominal ascites  No bone lesions noted  June 7, 2017- bronchoscopy showed a right bronchus lesion  Biopsy was consistent with a non-small cell lung carcinoma, favor squamous cell carcinoma  7/27/2017 - 11/29/2017 Chemotherapy      Abraxane 80 milligrams/meter squared day 1, 8, 15, carbo AUC -5, day 1 only, Q 21 days  12/1/2017 Progression      Progressive disease noted by radiography and physical exam, left inferior cervical lymph node mass  12/6/2017 -  Chemotherapy       Tecentriq 1200 mg IV Q 3 weeks initiated  History of Present Illness:  -No ref   provider found:    -Interval History:      Review of Systems:  Review of Systems   Constitutional: Negative for appetite change, diaphoresis, fatigue and fever  HENT: Negative for sinus pain  Eyes: Negative for discharge  Respiratory: Negative for cough and shortness of breath  Cardiovascular: Negative for chest pain  Gastrointestinal: Negative for abdominal pain, constipation and diarrhea  Endocrine: Negative for cold intolerance  Genitourinary: Negative for difficulty urinating and hematuria  Musculoskeletal: Negative for joint swelling  Skin: Negative for rash  Allergic/Immunologic: Negative for environmental allergies  Neurological: Negative for dizziness and headaches  Hematological: Negative for adenopathy  Psychiatric/Behavioral: Negative for agitation         Patient Active Problem List   Diagnosis    Cellulitis    Pleural effusion    Hyponatremia    Hilar mass    Lung cancer [facility] bone ([facility] )    Anxiety    Hypertension    RODRIGO (mycobacterium avium-intracellulare) ([facility] )    Tongue malignant neoplasm ([facility] )     Past Medical History:   Diagnosis Date    Anxiety     Cellulitis of both feet     Difficulty chewing     Difficulty swallowing     Edema leg     history of bilat LE edema bilat    Full dentures     History of cancer chemotherapy     most recent treatment 10/18 and sched to receive  once a  week till Dec    Hypertension     Hyponatremia     Leukocytosis     Lower leg edema     Lump     "more on right side" of tongue    Lump     left buttock/biopsy last week    Lung cancer ([facility] )     right    RODRIGO (mycobacterium avium-intracellulare) ([facility] )     pt and spouse unsure    Pleural effusion     pt and spouse unsure    Productive cough     occas, light yellow thick    Shortness of breath     occas    Shoulder blade pain     left    Tongue pain     Wears glasses      Past Surgical History:   Procedure Laterality Date    ADENOIDECTOMY      APPENDECTOMY      BRONCHOSCOPY N/A 6/7/2017    Procedure: BRONCHOSCOPY FLEXIBLE with endobronchial washings, brush biopsy to the bronchus intermedius and right lower lobe bronchus  FNA of the level VII lymph node, multiple passes  Forceps biopsies to the bronchus intermedius  ;  Surgeon: Martin Shultz MD;  [facility];   Service:     TN EXCIS TONGUE LESN,POST 1/3 Right 11/7/2017    Procedure: EXCISIONAL BIOPSY TONGUE MASS;  Surgeon: Deborah Rios DO;  [facility];  Service: ENT    SKIN BIOPSY Left     buttock    TONSILLECTOMY      UTERINE SUSPENSION      VAGINA SURGERY      insertion of pessary ring, last assessed: 6/15/17     Family History   Problem Relation Age of Onset    Dementia Mother     Stroke Father      Social History     Social History    Marital status: /Civil Union     Spouse name: N/A    Number of children: N/A    Years of education: N/A     Occupational History    employed      Social History Main Topics    Smoking status: Former Smoker     Packs/day: 0 50     Years: 15 00     Quit date: 05/2017    Smokeless tobacco: Never Used      Comment: quit cold turkey    Alcohol use Yes      Comment: Socially    Drug use: No    Sexual activity: Not on file     Other Topics Concern    Not on file     Social History Narrative    No narrative on file       Current Outpatient Prescriptions:     ALPRAZolam (XANAX) 0 25 mg tablet, Take 0 25 mg by mouth 4 (four) times a day as needed for anxiety Take 1/2 tab , Disp: , Rfl:     Atezolizumab (TECENTRIQ IV), Infuse into a venous catheter, Disp: , Rfl:     metoprolol succinate (TOPROL-XL) 25 mg 24 hr tablet, Take 0 5 tablets (12 5 mg total) by mouth daily, Disp: 30 tablet, Rfl: 5    oxyCODONE-acetaminophen (PERCOCET) 5-325 mg per tablet, Take as directed at home, Disp: , Rfl: 0    psyllium (METAMUCIL) 0 52 g capsule, Take by mouth, Disp: , Rfl:     traMADol (ULTRAM) 50 mg tablet, Take 50 mg by mouth every 6 (six) hours as needed for moderate pain, Disp: , Rfl:     zolpidem (AMBIEN) 5 mg tablet, Take 5 mg by mouth daily at bedtime as needed for sleep, Disp: , Rfl:   No Known Allergies  Vitals:    06/06/18 1152   BP: 168/84   Pulse: 69   Resp: 17   Temp: 97 9 °F (36 6 °C)   SpO2: 97%         Physical Exam   Constitutional: She is oriented to person, place, and time  She appears well-developed  HENT:   Head: Normocephalic  Eyes: Pupils are equal, round, and reactive to light  Neck: Neck supple  Cardiovascular: Normal rate  No murmur heard  Pulmonary/Chest: No respiratory distress  She has no wheezes  She has no rales  Abdominal: Soft  She exhibits no distension  There is no tenderness  There is no rebound  Musculoskeletal: She exhibits no edema  Lymphadenopathy:     She has no cervical adenopathy  Neurological: She is alert and oriented to person, place, and time  She displays normal reflexes  Skin: Skin is warm  No rash noted  Psychiatric: She has a normal mood and affect  Thought content normal            Performance Status: ECOG/Zubrod/WHO: 1 - Symptomatic but completely ambulatory    Labs:  CBC, Coags, BMP, Mg, Phos     Imaging  No results found  I reviewed the above laboratory and imaging data  Discussion/Summary:  In summary, this is a 70-year-old female history of lung cancer currently on Tecentriq  She is doing fairly well  She tolerates this without difficulty  Appetite is good, weight is stable  She generally functions without restriction  Her right shoulder cutaneous lesion was recurring  She saw her PCP and was placed on antibiotics which she finished about 5 days ago  She has minor erythema in the area  Skin is intact  There is no fluctuance noted  Observation is favored for now  Restaging just prior to her next visit in 6 weeks is arranged  Most recent labs are normal including CMP, CBC, TSH  The patient had some questions about the optimal duration of treatment with immuno therapy drugs  We reviewed that this is not known    There are mechanistic reasons and certainly single patient observation is to suggest that limited duration of therapy might be reasonable  The general recommendation, for the moment, however, is to continue treatment until toxicity or disease progression  I reviewed the above with the patient and her   They voiced understanding and agreement

## 2018-06-11 ENCOUNTER — TELEPHONE (OUTPATIENT)
Dept: HEMATOLOGY ONCOLOGY | Facility: CLINIC | Age: 78
End: 2018-06-11

## 2018-06-11 NOTE — TELEPHONE ENCOUNTER
Patients  called to advise that Terri Morales passed away earlier in the day  (6/11/2018)  All future appointments will be cancelled

## 2018-06-11 NOTE — TELEPHONE ENCOUNTER
[facility] asking if Dr Vega Yin will sign pt's death certificate   Please call

## 2018-06-12 NOTE — TELEPHONE ENCOUNTER
Dr Carlos Guevara spoke with , he will be signing death certificate  He told them that he is in the [facility] office today to sign it

## 2018-06-13 ENCOUNTER — HOSPITAL ENCOUNTER (OUTPATIENT)
Dept: INFUSION CENTER | Facility: CLINIC | Age: 78
End: 2018-06-13

## 2018-08-06 ENCOUNTER — TELEPHONE (OUTPATIENT)
Dept: HEMATOLOGY ONCOLOGY | Facility: CLINIC | Age: 78
End: 2018-08-06

## 2018-08-06 NOTE — TELEPHONE ENCOUNTER
Pt's  Andie Contreras called requesting initial pathology report for [facility] insurance  Mailed to him

## 2023-09-12 NOTE — PROGRESS NOTES
Aldo Lambert  1940  [address] [facility]  [facility] [phone]    No chief complaint on file  Lung cancer metastatic to bone [facility])    6/30/2017 Initial Diagnosis     Lung cancer metastatic to bone [facility])   June 2017-patient presented to the emergency room with lower extremity edema and redness  Additionally, she was found to have a right pleural effusion  CT chest, abdomen, pelvis showed right hilar mass, 3 2 cm mediastinal lymph nodes up to 15 mm, subcarinal 17 mm  Indeterminate left adrenal nodule 13 mm, moderate to large right pleural effusion  Moderate to large abdominal ascites  No bone lesions noted  June 7, 2017- bronchoscopy showed a right bronchus lesion  Biopsy was consistent with a non-small cell lung carcinoma, favor squamous cell carcinoma  7/27/2017 - 11/29/2017 Chemotherapy      Abraxane 80 milligrams/meter squared day 1, 8, 15, carbo AUC -5, day 1 only, Q 21 days  12/1/2017 Progression      Progressive disease noted by radiography and physical exam, left inferior cervical lymph node mass  12/6/2017 -  Chemotherapy       Tecentriq 1200 mg IV Q 3 weeks initiated  History of Present Illness:  -Emilia Gan MD:  -Previous Therapy (if not listed in Oncology History):  -Current Therapy (if not listed in Oncology History):  -Disease Status:  -Interval History:  -Tumor Markers:    Review of Systems:  Review of Systems   Constitutional: Positive for appetite change (AppetiteHas markedly improved in the past 2-3 weeks  )  Negative for diaphoresis, fatigue and fever  HENT: Negative for sinus pain  Eyes: Negative for discharge  Respiratory: Negative for cough and shortness of breath  Cardiovascular: Negative for chest pain  Gastrointestinal: Negative for abdominal pain, constipation and diarrhea  Endocrine: Negative for cold intolerance     Genitourinary: Negative for difficulty urinating and hematuria  Musculoskeletal: Negative for joint swelling  Skin: Negative for rash  Allergic/Immunologic: Negative for environmental allergies  Neurological: Negative for dizziness and headaches  Hematological: Negative for adenopathy  Psychiatric/Behavioral: Negative for agitation  Patient Active Problem List   Diagnosis    Cellulitis    Pleural effusion    Current smoker on some days    Hyponatremia    Hilar mass    Lung cancer metastatic [facility] bone [facility])     Past Medical History:   Diagnosis Date    Anxiety     Difficulty chewing     Difficulty swallowing     Edema leg     history of bilat LE edema bilat    Full dentures     History of cancer chemotherapy     most recent treatment 10/18 and sched to receive  once a  week till Dec    Hypertension     Hyponatremia     Leukocytosis     Lump     "more on right side" of tongue    Lump     left buttock/biopsy last week    Lung [facility] )     right    RODRIGO (mycobacterium avium-intracellulare) ([facility] )     pt and spouse unsure    Pleural effusion     pt and spouse unsure    Productive cough     occas, light yellow thick    Shortness of breath     occas    Shoulder blade pain     left    Tongue pain     Wears glasses      Past Surgical History:   Procedure Laterality Date    ADENOIDECTOMY      APPENDECTOMY      BRONCHOSCOPY N/A 6/7/2017    Procedure: BRONCHOSCOPY FLEXIBLE with endobronchial washings, brush biopsy to the bronchus intermedius and right lower lobe bronchus  FNA of the level VII lymph node, multiple passes  Forceps biopsies to the bronchus intermedius  ;  Surgeon: Roby Khan MD;  [facility]; Service:     IA CLAU TONGUE BERTHA,POST 1/3 Right 11/7/2017    Procedure: EXCISIONAL BIOPSY TONGUE MASS;  Surgeon: Bob Pulliam DO;  [facility];  Service: ENT    SKIN BIOPSY Left     buttock    TONSILLECTOMY      UTERINE SUSPENSION       No family history on file    Social History     Social History    Marital status: /Civil Union     Spouse name: N/A    Number of children: N/A    Years of education: N/A     Occupational History    Not on file  Social History Main Topics    Smoking status: Former Smoker     Packs/day: 0 50     Years: 15 00     Quit date: 05/2017    Smokeless tobacco: Never Used      Comment: quit cold turkey    Alcohol use Yes      Comment: Socially    Drug use: No    Sexual activity: Not on file     Other Topics Concern    Not on file     Social History Narrative    No narrative on file       Current Outpatient Prescriptions:     ALPRAZolam (XANAX) 0 25 mg tablet, Take 0 25 mg by mouth 4 (four) times a day as needed for anxiety Take 1/2 tab , Disp: , Rfl:     Atezolizumab (TECENTRIQ IV), Infuse into a venous catheter, Disp: , Rfl:     metoprolol succinate (TOPROL-XL) 25 mg 24 hr tablet, Take 12 5 mg by mouth daily  , Disp: , Rfl:     predniSONE 20 mg tablet, TAKE 1 TABLET DAILY IN AM WITH FOOD FOR APPETITE , Disp: , Rfl: 0    psyllium (METAMUCIL) 0 52 g capsule, Take by mouth, Disp: , Rfl:     traMADol (ULTRAM) 50 mg tablet, Take 50 mg by mouth every 6 (six) hours as needed for moderate pain, Disp: , Rfl:     zolpidem (AMBIEN) 5 mg tablet, Take 5 mg by mouth daily at bedtime as needed for sleep, Disp: , Rfl:     calcium polycarbophil (FIBERCON) 625 mg tablet, Take 625 mg by mouth daily, Disp: , Rfl:     oxyCODONE-acetaminophen (PERCOCET) 5-325 mg per tablet, Take as directed at home, Disp: , Rfl: 0  No Known Allergies  Vitals:    01/31/18 0844   BP: 142/78   Pulse: 77   Resp: 16   Temp: (!) 97 3 °F (36 3 °C)         Physical Exam   Constitutional: She is oriented to person, place, and time  She appears well-developed  HENT:   Head: Normocephalic  Eyes: Pupils are equal, round, and reactive to light  Neck: Neck supple  Cardiovascular: Normal rate  No murmur heard  Pulmonary/Chest: No respiratory distress  She has no wheezes  She has no rales  Abdominal: Soft  She exhibits no distension  There is no tenderness  There is no rebound  Musculoskeletal: She exhibits no edema  Lymphadenopathy:     She has no cervical adenopathy  Neurological: She is alert and oriented to person, place, and time  She displays normal reflexes  Skin: Skin is warm  No rash noted  Psychiatric: She has a normal mood and affect  Thought content normal            Performance Status: ECOG/Zubrod/WHO: 1 - Symptomatic but completely ambulatory    Labs:  CBC, Coags, BMP, Mg, Phos     Imaging  Nm Pet Ct Skull Base To Mid Thigh    Result Date: 1/25/2018  Narrative: PET/CT SCAN INDICATION: Metastatic renal cell carcinoma of the lung  Restaging examination following chemotherapy MODIFIER:     PS COMPARISON: 11/22/2017 CELL TYPE:  Non-small cell carcinoma, favoring squamous cell carcinoma, right bronchus TECHNIQUE:   8 2 mCi F-18-FDG administered IV  Multiplanar attenuation corrected and non attenuation corrected PET images are available for interpretation, and contiguous, low dose, axial CT sections were obtained from the skull base through the femurs following the administration of oral contrast material (7 5 cc Omnipaque-240 in 300 cc water)  Intravenous contrast material was not utilized  This examination, like all CT scans performed in the [facility], was performed utilizing techniques to minimize radiation dose exposure, including the use of iterative reconstruction and automated exposure control  Fasting serum glucose:  89 mg/dl FINDINGS: VISUALIZED BRAIN:   No acute abnormalities are seen  HEAD/NECK:   Prior study demonstrated abnormal glucose metabolism associated with the right posterior tongue, earlier SUV max 5 7, now 2 5 and only slightly asymmetric compared to the left side  Prior study demonstrated hypermetabolic superior left cervical adenopathy now resolved    Further inferiorly, bulky adenopathy was identified at the left neck base with significant partial improvement  Prior SUV max 22 2, now 6 4  Prior size of largest mass, which have been producing mass effect upon the trachea measured 4 4 x 3 8 cm, now approximately 3 0 x 2 7 cm  There is diminished rightward displacement of the trachea  There is also near complete resolution of previously seen left periclavicular adenopathy  CHEST:   There is a persistent hypermetabolic right paraspinal posterior intramuscular mass  Earlier size 1 3 x 1 1 cm, SUV max 16 9, currently 2 2 x 1 5 cm  SUV max 19 8  On prior study, there was a hypermetabolic nodule in the right upper lobe which has now resolved  The right lower lobe mass lesion has diminished in size and significantly diminished in glucose avidity  Earlier measured 4 3 x 3 6 cm SUV 19 0  Currently 2 8 x 2 4 cm  SUV max 2 7  Although there is still evidence of hypermetabolic subcarinal adenopathy, the size has diminished since the prior study, and the prior SUV max was 30 3 now 18 9  Near complete resolution of right hilar hypermetabolic adenopathy noted  Slightly hypermetabolic left hilar lymph nodes appear unchanged  ABDOMEN:   No FDG avid soft tissue lesions are seen  CT images: Extensive atherosclerotic calcifications  Left renal calcifications, mostly vascular although nephrolithiasis not excluded  No hydronephrosis  No ascites  No bowel obstruction  PELVIS: The prior examination demonstrated a left gluteal subcutaneous hypermetabolic lesion with SUV max of 24 1, now 1 1, earlier size 1 5 x 1 9 cm, now 11 mm  Similarly, marked improvement of medial right gluteal crease lesion  OSSEOUS STRUCTURES: Metastatic lesion involving the lateral left 7th rib, without change on CT, prior SUV max 1 0 now 1 5  Prior study demonstrated extensive disease at the cervicothoracic junction involving osseous structures and adjacent paraspinal tissue with SUV max 17, now 3 4    Irregularly shaped soft tissue component of the mass is smaller as is the area of abnormal activity  Degree of osseous erosion has not progressed  Involvement of the left T3-T4 neural foramen again noted but with diminished hypermetabolism and medial extent of hypermetabolism since the prior  Impression: Overall, there has been a significant favorable response to chemotherapeutic intervention  Some lesions have demonstrated complete or near complete resolution of hypermetabolism, for example the right posterior tongue base and left superior cervical adenopathy  Other lesions have demonstrated a significant partial response, for example the left neck base mass adjacent to the thyroid, right lower lobe lesion, or the gluteal lesions  There is also a significant partial response of the soft tissue and osseous erosive mass at the cervicothoracic junction  Only 1 soft tissue lesion has progressed (the right paraspinal intramuscular mass), and the left lateral 7th rib osseous lesion  appears similar  Workstation performed: [id number]     I reviewed the above laboratory and imaging data  Discussion/Summary: in summary, this is a 68-year-old female history of squamous cell carcinoma of the lung with lymph node, muscle, bone metastases, as outlined  She is currently on Tecentriq  She is tolerating this without any difficulty  She has no rash , diarrhea  TSH is normal   Hemoglobin is improving  CMP is normal     Recent PET-CT shows marked improvement in multifocal disease  Slight increase in SUV is noted in a right para spinal musculature metastatic lesion  This is a minimal change in SUV, previous 1 0, current 1 5, and I think this is of no clinical significance  Continue treatment is recommended  I reviewed the above with the patient and her   Tetracycline Counseling: Patient counseled regarding possible photosensitivity and increased risk for sunburn.  Patient instructed to avoid sunlight, if possible.  When exposed to sunlight, patients should wear protective clothing, sunglasses, and sunscreen.  The patient was instructed to call the office immediately if the following severe adverse effects occur:  hearing changes, easy bruising/bleeding, severe headache, or vision changes.  The patient verbalized understanding of the proper use and possible adverse effects of tetracycline.  All of the patient's questions and concerns were addressed. Patient understands to avoid pregnancy while on therapy due to potential birth defects.

## (undated) DEVICE — INTENDED FOR TISSUE SEPARATION, AND OTHER PROCEDURES THAT REQUIRE A SHARP SURGICAL BLADE TO PUNCTURE OR CUT.: Brand: BARD-PARKER SAFETY BLADES SIZE 15, STERILE

## (undated) DEVICE — GLOVE SRG BIOGEL ORTHOPEDIC 7

## (undated) DEVICE — NEEDLE 18 G X 1 1/2

## (undated) DEVICE — CYTOLOGY BRUSH 140 CM

## (undated) DEVICE — FORCEP BX RADIAL JAW 100CM

## (undated) DEVICE — TELFA NON-ADHERENT ABSORBENT DRESSING: Brand: TELFA

## (undated) DEVICE — STERILE T AND A PACK: Brand: CARDINAL HEALTH

## (undated) DEVICE — SYRINGE 10ML LL

## (undated) DEVICE — BUTTON SWITCH PENCIL HOLSTER: Brand: VALLEYLAB

## (undated) DEVICE — CAUTERY TIP POLISHER: Brand: DEVON

## (undated) DEVICE — NEEDLE 25G X 1 1/2

## (undated) DEVICE — SUT SILK 2-0 SH 30 IN K833H

## (undated) DEVICE — REM POLYHESIVE ADULT PATIENT RETURN ELECTRODE: Brand: VALLEYLAB

## (undated) DEVICE — SCD SEQUENTIAL COMPRESSION COMFORT SLEEVE MEDIUM KNEE LENGTH: Brand: KENDALL SCD

## (undated) DEVICE — TRANSBRONCHIAL ASPIRATION NEEDLE: Brand: EXCELON

## (undated) DEVICE — 2000CC GUARDIAN II: Brand: GUARDIAN

## (undated) DEVICE — 10FR FRAZIER SUCTION HANDLE: Brand: CARDINAL HEALTH

## (undated) DEVICE — TUBING SUCTION 5MM X 12 FT

## (undated) DEVICE — INSULATED NEEDLE ELECTRODE: Brand: EDGE